# Patient Record
Sex: FEMALE | Race: WHITE | Employment: UNEMPLOYED
[De-identification: names, ages, dates, MRNs, and addresses within clinical notes are randomized per-mention and may not be internally consistent; named-entity substitution may affect disease eponyms.]

---

## 2017-01-01 PROBLEM — F17.200 SMOKER: Status: ACTIVE | Noted: 2017-01-01

## 2017-01-01 PROBLEM — E11.9 TYPE 2 DIABETES MELLITUS (HCC): Status: ACTIVE | Noted: 2017-01-01

## 2017-01-05 ENCOUNTER — TELEPHONE (OUTPATIENT)
Dept: NEUROLOGY | Facility: CLINIC | Age: 72
End: 2017-01-05

## 2017-01-11 PROBLEM — R07.1 CHEST PAIN ON BREATHING: Status: ACTIVE | Noted: 2017-01-11

## 2017-01-11 PROBLEM — R06.02 SOB (SHORTNESS OF BREATH): Status: ACTIVE | Noted: 2017-01-11

## 2017-01-11 PROBLEM — J20.9 ACUTE BRONCHITIS DUE TO INFECTION: Status: ACTIVE | Noted: 2017-01-11

## 2017-01-13 PROBLEM — E86.0 DEHYDRATION: Status: ACTIVE | Noted: 2017-01-13

## 2017-01-13 PROBLEM — J96.11 CHRONIC RESPIRATORY FAILURE WITH HYPOXIA (HCC): Status: ACTIVE | Noted: 2017-01-13

## 2017-02-12 ENCOUNTER — APPOINTMENT (OUTPATIENT)
Dept: CT IMAGING | Age: 72
DRG: 372 | End: 2017-02-12
Payer: COMMERCIAL

## 2017-02-12 ENCOUNTER — HOSPITAL ENCOUNTER (INPATIENT)
Age: 72
LOS: 3 days | Discharge: HOME HEALTH | DRG: 372 | End: 2017-02-15
Attending: EMERGENCY MEDICINE | Admitting: INTERNAL MEDICINE
Payer: COMMERCIAL
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
## 2017-02-12 DIAGNOSIS — D49.2 LUMBAR SPINE TUMOR: ICD-10-CM

## 2017-02-12 LAB
-: ABNORMAL
ABSOLUTE EOS #: 0 K/UL (ref 0–0.4)
ABSOLUTE LYMPH #: 2.2 K/UL (ref 1–4.8)
ABSOLUTE MONO #: 1 K/UL (ref 0.1–1.2)
AMORPHOUS: ABNORMAL
ANION GAP SERPL CALCULATED.3IONS-SCNC: 14 MMOL/L (ref 9–17)
BACTERIA: ABNORMAL
BASOPHILS # BLD: 0 % (ref 0–2)
BASOPHILS ABSOLUTE: 0 K/UL (ref 0–0.2)
BILIRUBIN URINE: NEGATIVE
BUN BLDV-MCNC: 31 MG/DL (ref 8–23)
BUN/CREAT BLD: ABNORMAL (ref 9–20)
CALCIUM SERPL-MCNC: 9.3 MG/DL (ref 8.6–10.4)
CASTS UA: ABNORMAL /LPF (ref 0–8)
CHLORIDE BLD-SCNC: 93 MMOL/L (ref 98–107)
CO2: 27 MMOL/L (ref 20–31)
COLOR: YELLOW
COMMENT UA: ABNORMAL
CREAT SERPL-MCNC: 0.91 MG/DL (ref 0.5–0.9)
CRYSTALS, UA: ABNORMAL /HPF
DIFFERENTIAL TYPE: ABNORMAL
EOSINOPHILS RELATIVE PERCENT: 0 % (ref 1–4)
EPITHELIAL CELLS UA: ABNORMAL /HPF (ref 0–5)
GFR AFRICAN AMERICAN: >60 ML/MIN
GFR NON-AFRICAN AMERICAN: >60 ML/MIN
GFR SERPL CREATININE-BSD FRML MDRD: ABNORMAL ML/MIN/{1.73_M2}
GFR SERPL CREATININE-BSD FRML MDRD: ABNORMAL ML/MIN/{1.73_M2}
GLUCOSE BLD-MCNC: 181 MG/DL (ref 65–105)
GLUCOSE BLD-MCNC: 96 MG/DL (ref 70–99)
GLUCOSE URINE: NEGATIVE
HCT VFR BLD CALC: 32.7 % (ref 36–46)
HEMOGLOBIN: 10.7 G/DL (ref 12–16)
KETONES, URINE: ABNORMAL
LACTIC ACID, WHOLE BLOOD: 1.6 MMOL/L (ref 0.7–2.1)
LEUKOCYTE ESTERASE, URINE: ABNORMAL
LYMPHOCYTES # BLD: 22 % (ref 24–44)
MCH RBC QN AUTO: 28.4 PG (ref 26–34)
MCHC RBC AUTO-ENTMCNC: 32.8 G/DL (ref 31–37)
MCV RBC AUTO: 86.8 FL (ref 80–100)
MONOCYTES # BLD: 10 % (ref 2–11)
MUCUS: ABNORMAL
NITRITE, URINE: NEGATIVE
OTHER OBSERVATIONS UA: ABNORMAL
PDW BLD-RTO: 15.4 % (ref 12.5–15.4)
PH UA: 5.5 (ref 5–8)
PLATELET # BLD: 431 K/UL (ref 140–450)
PLATELET ESTIMATE: ABNORMAL
PMV BLD AUTO: 8.2 FL (ref 6–12)
POTASSIUM SERPL-SCNC: 5.1 MMOL/L (ref 3.7–5.3)
PROTEIN UA: NEGATIVE
RBC # BLD: 3.77 M/UL (ref 4–5.2)
RBC # BLD: ABNORMAL 10*6/UL
RBC UA: ABNORMAL /HPF (ref 0–4)
RENAL EPITHELIAL, UA: ABNORMAL /HPF
SEG NEUTROPHILS: 68 % (ref 36–66)
SEGMENTED NEUTROPHILS ABSOLUTE COUNT: 6.9 K/UL (ref 1.8–7.7)
SODIUM BLD-SCNC: 134 MMOL/L (ref 135–144)
SPECIFIC GRAVITY UA: 1.02 (ref 1–1.03)
TRICHOMONAS: ABNORMAL
TURBIDITY: CLEAR
URINE HGB: NEGATIVE
UROBILINOGEN, URINE: NORMAL
WBC # BLD: 10.2 K/UL (ref 3.5–11)
WBC # BLD: ABNORMAL 10*3/UL
WBC UA: ABNORMAL /HPF (ref 0–5)
YEAST: ABNORMAL

## 2017-02-12 PROCEDURE — 99285 EMERGENCY DEPT VISIT HI MDM: CPT

## 2017-02-12 PROCEDURE — 72131 CT LUMBAR SPINE W/O DYE: CPT | Performed by: RADIOLOGY

## 2017-02-12 PROCEDURE — 80048 BASIC METABOLIC PNL TOTAL CA: CPT

## 2017-02-12 PROCEDURE — 1200000000 HC SEMI PRIVATE

## 2017-02-12 PROCEDURE — 6360000002 HC RX W HCPCS: Performed by: EMERGENCY MEDICINE

## 2017-02-12 PROCEDURE — 96374 THER/PROPH/DIAG INJ IV PUSH: CPT

## 2017-02-12 PROCEDURE — 87086 URINE CULTURE/COLONY COUNT: CPT

## 2017-02-12 PROCEDURE — 74176 CT ABD & PELVIS W/O CONTRAST: CPT

## 2017-02-12 PROCEDURE — 74176 CT ABD & PELVIS W/O CONTRAST: CPT | Performed by: RADIOLOGY

## 2017-02-12 PROCEDURE — 81001 URINALYSIS AUTO W/SCOPE: CPT

## 2017-02-12 PROCEDURE — 96375 TX/PRO/DX INJ NEW DRUG ADDON: CPT

## 2017-02-12 PROCEDURE — 72131 CT LUMBAR SPINE W/O DYE: CPT

## 2017-02-12 PROCEDURE — 83605 ASSAY OF LACTIC ACID: CPT

## 2017-02-12 PROCEDURE — 82947 ASSAY GLUCOSE BLOOD QUANT: CPT

## 2017-02-12 PROCEDURE — 93005 ELECTROCARDIOGRAM TRACING: CPT

## 2017-02-12 PROCEDURE — 85025 COMPLETE CBC W/AUTO DIFF WBC: CPT

## 2017-02-12 RX ORDER — MIRTAZAPINE 15 MG/1
15 TABLET, FILM COATED ORAL NIGHTLY
Status: CANCELLED | OUTPATIENT
Start: 2017-02-12

## 2017-02-12 RX ORDER — MORPHINE SULFATE 4 MG/ML
4 INJECTION, SOLUTION INTRAMUSCULAR; INTRAVENOUS ONCE
Status: COMPLETED | OUTPATIENT
Start: 2017-02-12 | End: 2017-02-12

## 2017-02-12 RX ORDER — FENTANYL CITRATE 50 UG/ML
25 INJECTION, SOLUTION INTRAMUSCULAR; INTRAVENOUS ONCE
Status: COMPLETED | OUTPATIENT
Start: 2017-02-12 | End: 2017-02-12

## 2017-02-12 RX ORDER — ACETAMINOPHEN 325 MG/1
650 TABLET ORAL EVERY 4 HOURS PRN
Status: CANCELLED | OUTPATIENT
Start: 2017-02-12

## 2017-02-12 RX ORDER — PREGABALIN 100 MG/1
100 CAPSULE ORAL 3 TIMES DAILY
Status: DISCONTINUED | OUTPATIENT
Start: 2017-02-12 | End: 2017-02-15 | Stop reason: HOSPADM

## 2017-02-12 RX ORDER — LANOLIN ALCOHOL/MO/W.PET/CERES
325 CREAM (GRAM) TOPICAL
Status: DISCONTINUED | OUTPATIENT
Start: 2017-02-12 | End: 2017-02-15 | Stop reason: HOSPADM

## 2017-02-12 RX ORDER — TRAZODONE HYDROCHLORIDE 50 MG/1
50 TABLET ORAL NIGHTLY
Status: CANCELLED | OUTPATIENT
Start: 2017-02-12

## 2017-02-12 RX ORDER — CETIRIZINE HYDROCHLORIDE 10 MG/1
10 TABLET ORAL DAILY
Status: CANCELLED | OUTPATIENT
Start: 2017-02-13

## 2017-02-12 RX ORDER — SODIUM CHLORIDE 0.9 % (FLUSH) 0.9 %
10 SYRINGE (ML) INJECTION EVERY 12 HOURS SCHEDULED
Status: DISCONTINUED | OUTPATIENT
Start: 2017-02-12 | End: 2017-02-15 | Stop reason: HOSPADM

## 2017-02-12 RX ORDER — METOPROLOL SUCCINATE 25 MG/1
25 TABLET, EXTENDED RELEASE ORAL DAILY
Status: DISCONTINUED | OUTPATIENT
Start: 2017-02-13 | End: 2017-02-13

## 2017-02-12 RX ORDER — TIZANIDINE 2 MG/1
2 TABLET ORAL 2 TIMES DAILY
Status: CANCELLED | OUTPATIENT
Start: 2017-02-12

## 2017-02-12 RX ORDER — DULOXETIN HYDROCHLORIDE 30 MG/1
60 CAPSULE, DELAYED RELEASE ORAL 2 TIMES DAILY
Status: DISCONTINUED | OUTPATIENT
Start: 2017-02-12 | End: 2017-02-15 | Stop reason: HOSPADM

## 2017-02-12 RX ORDER — OXCARBAZEPINE 300 MG/1
600 TABLET, FILM COATED ORAL 2 TIMES DAILY
Status: DISCONTINUED | OUTPATIENT
Start: 2017-02-12 | End: 2017-02-14

## 2017-02-12 RX ORDER — ALBUTEROL SULFATE 2.5 MG/3ML
2.5 SOLUTION RESPIRATORY (INHALATION) EVERY 6 HOURS PRN
Status: DISCONTINUED | OUTPATIENT
Start: 2017-02-12 | End: 2017-02-15 | Stop reason: HOSPADM

## 2017-02-12 RX ORDER — BUSPIRONE HYDROCHLORIDE 10 MG/1
10 TABLET ORAL 2 TIMES DAILY
Status: DISCONTINUED | OUTPATIENT
Start: 2017-02-12 | End: 2017-02-15 | Stop reason: HOSPADM

## 2017-02-12 RX ORDER — ONDANSETRON 2 MG/ML
4 INJECTION INTRAMUSCULAR; INTRAVENOUS EVERY 6 HOURS PRN
Status: DISCONTINUED | OUTPATIENT
Start: 2017-02-12 | End: 2017-02-15 | Stop reason: HOSPADM

## 2017-02-12 RX ORDER — ATORVASTATIN CALCIUM 40 MG/1
40 TABLET, FILM COATED ORAL DAILY
Status: DISCONTINUED | OUTPATIENT
Start: 2017-02-13 | End: 2017-02-15 | Stop reason: HOSPADM

## 2017-02-12 RX ORDER — BUDESONIDE AND FORMOTEROL FUMARATE DIHYDRATE 160; 4.5 UG/1; UG/1
1 AEROSOL RESPIRATORY (INHALATION) 2 TIMES DAILY
Status: DISCONTINUED | OUTPATIENT
Start: 2017-02-12 | End: 2017-02-15 | Stop reason: HOSPADM

## 2017-02-12 RX ORDER — ACETAMINOPHEN 325 MG/1
650 TABLET ORAL EVERY 4 HOURS PRN
Status: DISCONTINUED | OUTPATIENT
Start: 2017-02-12 | End: 2017-02-15 | Stop reason: HOSPADM

## 2017-02-12 RX ORDER — SODIUM CHLORIDE 0.9 % (FLUSH) 0.9 %
10 SYRINGE (ML) INJECTION PRN
Status: CANCELLED | OUTPATIENT
Start: 2017-02-12

## 2017-02-12 RX ORDER — SODIUM CHLORIDE 0.9 % (FLUSH) 0.9 %
10 SYRINGE (ML) INJECTION PRN
Status: DISCONTINUED | OUTPATIENT
Start: 2017-02-12 | End: 2017-02-15 | Stop reason: HOSPADM

## 2017-02-12 RX ORDER — OXYCODONE HYDROCHLORIDE AND ACETAMINOPHEN 5; 325 MG/1; MG/1
1 TABLET ORAL EVERY 4 HOURS PRN
Status: DISCONTINUED | OUTPATIENT
Start: 2017-02-12 | End: 2017-02-13

## 2017-02-12 RX ORDER — SODIUM CHLORIDE 0.9 % (FLUSH) 0.9 %
10 SYRINGE (ML) INJECTION EVERY 12 HOURS SCHEDULED
Status: CANCELLED | OUTPATIENT
Start: 2017-02-12

## 2017-02-12 RX ORDER — QUETIAPINE FUMARATE 25 MG/1
50 TABLET, FILM COATED ORAL DAILY
Status: DISCONTINUED | OUTPATIENT
Start: 2017-02-13 | End: 2017-02-14

## 2017-02-12 RX ORDER — SODIUM CHLORIDE 9 MG/ML
INJECTION, SOLUTION INTRAVENOUS CONTINUOUS
Status: DISCONTINUED | OUTPATIENT
Start: 2017-02-12 | End: 2017-02-15 | Stop reason: HOSPADM

## 2017-02-12 RX ADMIN — FENTANYL CITRATE 25 MCG: 50 INJECTION, SOLUTION INTRAMUSCULAR; INTRAVENOUS at 17:57

## 2017-02-12 RX ADMIN — MORPHINE SULFATE 4 MG: 4 INJECTION, SOLUTION INTRAMUSCULAR; INTRAVENOUS at 21:27

## 2017-02-12 ASSESSMENT — PAIN SCALES - GENERAL
PAINLEVEL_OUTOF10: 9
PAINLEVEL_OUTOF10: 8
PAINLEVEL_OUTOF10: 8
PAINLEVEL_OUTOF10: 10

## 2017-02-12 ASSESSMENT — PAIN DESCRIPTION - LOCATION
LOCATION: BACK;LEG
LOCATION: BACK

## 2017-02-12 ASSESSMENT — PAIN DESCRIPTION - DESCRIPTORS
DESCRIPTORS: DISCOMFORT;ACHING
DESCRIPTORS: DISCOMFORT;ACHING

## 2017-02-12 ASSESSMENT — PAIN DESCRIPTION - ORIENTATION
ORIENTATION: LOWER;LEFT;RIGHT
ORIENTATION: LEFT;MID

## 2017-02-12 ASSESSMENT — PAIN DESCRIPTION - ONSET: ONSET: GRADUAL

## 2017-02-12 ASSESSMENT — PAIN DESCRIPTION - PAIN TYPE
TYPE: ACUTE PAIN
TYPE: ACUTE PAIN

## 2017-02-12 ASSESSMENT — PAIN DESCRIPTION - PROGRESSION: CLINICAL_PROGRESSION: GRADUALLY WORSENING

## 2017-02-12 ASSESSMENT — PAIN DESCRIPTION - FREQUENCY: FREQUENCY: INTERMITTENT

## 2017-02-13 ENCOUNTER — APPOINTMENT (OUTPATIENT)
Dept: MRI IMAGING | Age: 72
DRG: 372 | End: 2017-02-13
Payer: COMMERCIAL

## 2017-02-13 PROBLEM — Z79.899 POLYPHARMACY: Status: ACTIVE | Noted: 2017-02-13

## 2017-02-13 PROBLEM — R55 PRE-SYNCOPE: Status: ACTIVE | Noted: 2017-02-13

## 2017-02-13 PROBLEM — J44.9 COPD (CHRONIC OBSTRUCTIVE PULMONARY DISEASE) (HCC): Status: ACTIVE | Noted: 2017-02-13

## 2017-02-13 PROBLEM — R42 DIZZINESS: Status: ACTIVE | Noted: 2017-02-13

## 2017-02-13 PROBLEM — F31.9 BIPOLAR 1 DISORDER (HCC): Status: ACTIVE | Noted: 2017-02-13

## 2017-02-13 PROBLEM — F32.A DEPRESSION: Status: ACTIVE | Noted: 2017-02-13

## 2017-02-13 LAB
ABSOLUTE EOS #: 0.1 K/UL (ref 0–0.4)
ABSOLUTE LYMPH #: 2.6 K/UL (ref 1–4.8)
ABSOLUTE MONO #: 0.9 K/UL (ref 0.1–1.2)
ANION GAP SERPL CALCULATED.3IONS-SCNC: 10 MMOL/L (ref 9–17)
BASOPHILS # BLD: 0 % (ref 0–2)
BASOPHILS ABSOLUTE: 0 K/UL (ref 0–0.2)
BUN BLDV-MCNC: 39 MG/DL (ref 8–23)
BUN/CREAT BLD: ABNORMAL (ref 9–20)
C DIFFICILE TOXINS, PCR: ABNORMAL
C-REACTIVE PROTEIN: 17.6 MG/L (ref 0–5)
CALCIUM SERPL-MCNC: 8.8 MG/DL (ref 8.6–10.4)
CHLORIDE BLD-SCNC: 94 MMOL/L (ref 98–107)
CO2: 29 MMOL/L (ref 20–31)
CREAT SERPL-MCNC: 1.26 MG/DL (ref 0.5–0.9)
CULTURE: NORMAL
CULTURE: NORMAL
DIFFERENTIAL TYPE: ABNORMAL
EOSINOPHILS RELATIVE PERCENT: 1 % (ref 1–4)
FERRITIN: 26 UG/L (ref 13–150)
GFR AFRICAN AMERICAN: 51 ML/MIN
GFR NON-AFRICAN AMERICAN: 42 ML/MIN
GFR SERPL CREATININE-BSD FRML MDRD: ABNORMAL ML/MIN/{1.73_M2}
GFR SERPL CREATININE-BSD FRML MDRD: ABNORMAL ML/MIN/{1.73_M2}
GLUCOSE BLD-MCNC: 121 MG/DL (ref 65–105)
GLUCOSE BLD-MCNC: 121 MG/DL (ref 65–105)
GLUCOSE BLD-MCNC: 143 MG/DL (ref 65–105)
GLUCOSE BLD-MCNC: 94 MG/DL (ref 70–99)
GLUCOSE BLD-MCNC: 98 MG/DL (ref 65–105)
HCT VFR BLD CALC: 28.4 % (ref 36–46)
HEMOGLOBIN: 9.3 G/DL (ref 12–16)
IRON SATURATION: 15 % (ref 20–55)
IRON: 38 UG/DL (ref 37–145)
LYMPHOCYTES # BLD: 30 % (ref 24–44)
Lab: NORMAL
MCH RBC QN AUTO: 28.4 PG (ref 26–34)
MCHC RBC AUTO-ENTMCNC: 32.7 G/DL (ref 31–37)
MCV RBC AUTO: 87 FL (ref 80–100)
MONOCYTES # BLD: 11 % (ref 2–11)
PDW BLD-RTO: 15.9 % (ref 12.5–15.4)
PLATELET # BLD: 366 K/UL (ref 140–450)
PLATELET ESTIMATE: ABNORMAL
PMV BLD AUTO: 7.7 FL (ref 6–12)
POTASSIUM SERPL-SCNC: 4.7 MMOL/L (ref 3.7–5.3)
RBC # BLD: 3.26 M/UL (ref 4–5.2)
RBC # BLD: ABNORMAL 10*6/UL
SEDIMENTATION RATE, ERYTHROCYTE: 23 MM (ref 0–20)
SEG NEUTROPHILS: 58 % (ref 36–66)
SEGMENTED NEUTROPHILS ABSOLUTE COUNT: 5.2 K/UL (ref 1.8–7.7)
SODIUM BLD-SCNC: 133 MMOL/L (ref 135–144)
SPECIMEN DESCRIPTION: ABNORMAL
SPECIMEN DESCRIPTION: NORMAL
STATUS: NORMAL
TOTAL CK: 22 U/L (ref 26–192)
TOTAL IRON BINDING CAPACITY: 247 UG/DL (ref 250–450)
UNSATURATED IRON BINDING CAPACITY: 209 UG/DL (ref 112–347)
WBC # BLD: 8.8 K/UL (ref 3.5–11)
WBC # BLD: ABNORMAL 10*3/UL

## 2017-02-13 PROCEDURE — 6370000000 HC RX 637 (ALT 250 FOR IP): Performed by: STUDENT IN AN ORGANIZED HEALTH CARE EDUCATION/TRAINING PROGRAM

## 2017-02-13 PROCEDURE — 97530 THERAPEUTIC ACTIVITIES: CPT

## 2017-02-13 PROCEDURE — 97162 PT EVAL MOD COMPLEX 30 MIN: CPT

## 2017-02-13 PROCEDURE — 85025 COMPLETE CBC W/AUTO DIFF WBC: CPT

## 2017-02-13 PROCEDURE — 83550 IRON BINDING TEST: CPT

## 2017-02-13 PROCEDURE — 83540 ASSAY OF IRON: CPT

## 2017-02-13 PROCEDURE — G8988 SELF CARE GOAL STATUS: HCPCS

## 2017-02-13 PROCEDURE — 72158 MRI LUMBAR SPINE W/O & W/DYE: CPT

## 2017-02-13 PROCEDURE — 97166 OT EVAL MOD COMPLEX 45 MIN: CPT

## 2017-02-13 PROCEDURE — 6370000000 HC RX 637 (ALT 250 FOR IP): Performed by: INTERNAL MEDICINE

## 2017-02-13 PROCEDURE — C9113 INJ PANTOPRAZOLE SODIUM, VIA: HCPCS | Performed by: NURSE PRACTITIONER

## 2017-02-13 PROCEDURE — 83630 LACTOFERRIN FECAL (QUAL): CPT

## 2017-02-13 PROCEDURE — G8979 MOBILITY GOAL STATUS: HCPCS

## 2017-02-13 PROCEDURE — 6360000002 HC RX W HCPCS: Performed by: STUDENT IN AN ORGANIZED HEALTH CARE EDUCATION/TRAINING PROGRAM

## 2017-02-13 PROCEDURE — 1200000000 HC SEMI PRIVATE

## 2017-02-13 PROCEDURE — 94640 AIRWAY INHALATION TREATMENT: CPT

## 2017-02-13 PROCEDURE — 85651 RBC SED RATE NONAUTOMATED: CPT

## 2017-02-13 PROCEDURE — 82728 ASSAY OF FERRITIN: CPT

## 2017-02-13 PROCEDURE — 87493 C DIFF AMPLIFIED PROBE: CPT

## 2017-02-13 PROCEDURE — 86140 C-REACTIVE PROTEIN: CPT

## 2017-02-13 PROCEDURE — 83993 ASSAY FOR CALPROTECTIN FECAL: CPT

## 2017-02-13 PROCEDURE — 83520 IMMUNOASSAY QUANT NOS NONAB: CPT

## 2017-02-13 PROCEDURE — 99233 SBSQ HOSP IP/OBS HIGH 50: CPT | Performed by: INTERNAL MEDICINE

## 2017-02-13 PROCEDURE — G8987 SELF CARE CURRENT STATUS: HCPCS

## 2017-02-13 PROCEDURE — G8978 MOBILITY CURRENT STATUS: HCPCS

## 2017-02-13 PROCEDURE — 6360000002 HC RX W HCPCS: Performed by: NURSE PRACTITIONER

## 2017-02-13 PROCEDURE — 2580000003 HC RX 258: Performed by: NURSE PRACTITIONER

## 2017-02-13 PROCEDURE — 6360000004 HC RX CONTRAST MEDICATION: Performed by: EMERGENCY MEDICINE

## 2017-02-13 PROCEDURE — 80048 BASIC METABOLIC PNL TOTAL CA: CPT

## 2017-02-13 PROCEDURE — A9579 GAD-BASE MR CONTRAST NOS,1ML: HCPCS | Performed by: EMERGENCY MEDICINE

## 2017-02-13 PROCEDURE — 99222 1ST HOSP IP/OBS MODERATE 55: CPT | Performed by: NEUROLOGICAL SURGERY

## 2017-02-13 PROCEDURE — 36415 COLL VENOUS BLD VENIPUNCTURE: CPT

## 2017-02-13 PROCEDURE — 2580000003 HC RX 258: Performed by: INTERNAL MEDICINE

## 2017-02-13 PROCEDURE — 82947 ASSAY GLUCOSE BLOOD QUANT: CPT

## 2017-02-13 PROCEDURE — 82550 ASSAY OF CK (CPK): CPT

## 2017-02-13 PROCEDURE — 2580000003 HC RX 258: Performed by: STUDENT IN AN ORGANIZED HEALTH CARE EDUCATION/TRAINING PROGRAM

## 2017-02-13 PROCEDURE — 97535 SELF CARE MNGMENT TRAINING: CPT

## 2017-02-13 RX ORDER — METRONIDAZOLE 500 MG/1
500 TABLET ORAL EVERY 8 HOURS SCHEDULED
Status: DISCONTINUED | OUTPATIENT
Start: 2017-02-13 | End: 2017-02-15 | Stop reason: HOSPADM

## 2017-02-13 RX ORDER — LORAZEPAM 0.5 MG/1
0.5 TABLET ORAL ONCE
Status: COMPLETED | OUTPATIENT
Start: 2017-02-13 | End: 2017-02-13

## 2017-02-13 RX ORDER — PANTOPRAZOLE SODIUM 40 MG/10ML
40 INJECTION, POWDER, LYOPHILIZED, FOR SOLUTION INTRAVENOUS 2 TIMES DAILY
Status: DISCONTINUED | OUTPATIENT
Start: 2017-02-13 | End: 2017-02-14

## 2017-02-13 RX ORDER — LOPERAMIDE HYDROCHLORIDE 2 MG/1
2 TABLET ORAL PRN
COMMUNITY

## 2017-02-13 RX ORDER — DEXTROSE MONOHYDRATE 50 MG/ML
100 INJECTION, SOLUTION INTRAVENOUS PRN
Status: DISCONTINUED | OUTPATIENT
Start: 2017-02-13 | End: 2017-02-15 | Stop reason: HOSPADM

## 2017-02-13 RX ORDER — DEXTROSE MONOHYDRATE 25 G/50ML
12.5 INJECTION, SOLUTION INTRAVENOUS PRN
Status: DISCONTINUED | OUTPATIENT
Start: 2017-02-13 | End: 2017-02-15 | Stop reason: HOSPADM

## 2017-02-13 RX ORDER — 0.9 % SODIUM CHLORIDE 0.9 %
10 VIAL (ML) INJECTION 2 TIMES DAILY
Status: DISCONTINUED | OUTPATIENT
Start: 2017-02-13 | End: 2017-02-14

## 2017-02-13 RX ORDER — 0.9 % SODIUM CHLORIDE 0.9 %
1000 INTRAVENOUS SOLUTION INTRAVENOUS ONCE
Status: COMPLETED | OUTPATIENT
Start: 2017-02-13 | End: 2017-02-13

## 2017-02-13 RX ORDER — MORPHINE SULFATE 4 MG/ML
4 INJECTION, SOLUTION INTRAMUSCULAR; INTRAVENOUS EVERY 4 HOURS PRN
Status: DISCONTINUED | OUTPATIENT
Start: 2017-02-13 | End: 2017-02-13

## 2017-02-13 RX ORDER — NICOTINE POLACRILEX 4 MG
15 LOZENGE BUCCAL PRN
Status: DISCONTINUED | OUTPATIENT
Start: 2017-02-13 | End: 2017-02-15 | Stop reason: HOSPADM

## 2017-02-13 RX ORDER — OXYCODONE HYDROCHLORIDE 5 MG/1
5 TABLET ORAL EVERY 4 HOURS PRN
Status: DISCONTINUED | OUTPATIENT
Start: 2017-02-13 | End: 2017-02-15 | Stop reason: HOSPADM

## 2017-02-13 RX ORDER — MORPHINE SULFATE 2 MG/ML
2 INJECTION, SOLUTION INTRAMUSCULAR; INTRAVENOUS EVERY 4 HOURS PRN
Status: DISCONTINUED | OUTPATIENT
Start: 2017-02-13 | End: 2017-02-13

## 2017-02-13 RX ADMIN — METOPROLOL SUCCINATE 25 MG: 25 TABLET, FILM COATED, EXTENDED RELEASE ORAL at 08:04

## 2017-02-13 RX ADMIN — PANTOPRAZOLE SODIUM 40 MG: 40 INJECTION, POWDER, FOR SOLUTION INTRAVENOUS at 15:41

## 2017-02-13 RX ADMIN — DULOXETINE HYDROCHLORIDE 60 MG: 30 CAPSULE, DELAYED RELEASE ORAL at 20:47

## 2017-02-13 RX ADMIN — MORPHINE SULFATE 2 MG: 2 INJECTION, SOLUTION INTRAMUSCULAR; INTRAVENOUS at 03:22

## 2017-02-13 RX ADMIN — METRONIDAZOLE 500 MG: 500 TABLET, FILM COATED ORAL at 17:26

## 2017-02-13 RX ADMIN — SODIUM CHLORIDE: 9 INJECTION, SOLUTION INTRAVENOUS at 01:23

## 2017-02-13 RX ADMIN — Medication 10 ML: at 20:48

## 2017-02-13 RX ADMIN — TIOTROPIUM BROMIDE 18 MCG: 18 CAPSULE ORAL; RESPIRATORY (INHALATION) at 08:22

## 2017-02-13 RX ADMIN — OXYCODONE HYDROCHLORIDE AND ACETAMINOPHEN 1 TABLET: 5; 325 TABLET ORAL at 00:29

## 2017-02-13 RX ADMIN — OXYCODONE HYDROCHLORIDE 5 MG: 5 TABLET ORAL at 13:06

## 2017-02-13 RX ADMIN — GADOPENTETATE DIMEGLUMINE 16 ML: 469.01 INJECTION INTRAVENOUS at 06:16

## 2017-02-13 RX ADMIN — SODIUM CHLORIDE 1000 ML: 9 INJECTION, SOLUTION INTRAVENOUS at 13:06

## 2017-02-13 RX ADMIN — PREGABALIN 100 MG: 100 CAPSULE ORAL at 08:04

## 2017-02-13 RX ADMIN — BUSPIRONE HYDROCHLORIDE 10 MG: 10 TABLET ORAL at 08:04

## 2017-02-13 RX ADMIN — BUSPIRONE HYDROCHLORIDE 10 MG: 10 TABLET ORAL at 01:25

## 2017-02-13 RX ADMIN — BUSPIRONE HYDROCHLORIDE 10 MG: 10 TABLET ORAL at 20:47

## 2017-02-13 RX ADMIN — PANTOPRAZOLE SODIUM 40 MG: 40 INJECTION, POWDER, FOR SOLUTION INTRAVENOUS at 20:48

## 2017-02-13 RX ADMIN — VITAMIN D, TAB 1000IU (100/BT) 2000 UNITS: 25 TAB at 01:24

## 2017-02-13 RX ADMIN — OXCARBAZEPINE 600 MG: 300 TABLET, FILM COATED ORAL at 20:47

## 2017-02-13 RX ADMIN — BUDESONIDE AND FORMOTEROL FUMARATE DIHYDRATE 1 PUFF: 160; 4.5 AEROSOL RESPIRATORY (INHALATION) at 08:22

## 2017-02-13 RX ADMIN — FERROUS SULFATE TAB EC 325 MG (65 MG FE EQUIVALENT) 325 MG: 325 (65 FE) TABLET DELAYED RESPONSE at 17:23

## 2017-02-13 RX ADMIN — OXCARBAZEPINE 600 MG: 300 TABLET, FILM COATED ORAL at 08:04

## 2017-02-13 RX ADMIN — DULOXETINE HYDROCHLORIDE 60 MG: 30 CAPSULE, DELAYED RELEASE ORAL at 01:24

## 2017-02-13 RX ADMIN — VITAMIN D, TAB 1000IU (100/BT) 2000 UNITS: 25 TAB at 17:23

## 2017-02-13 RX ADMIN — ATORVASTATIN CALCIUM 40 MG: 40 TABLET, FILM COATED ORAL at 17:23

## 2017-02-13 RX ADMIN — QUETIAPINE FUMARATE 50 MG: 25 TABLET ORAL at 20:47

## 2017-02-13 RX ADMIN — DULOXETINE HYDROCHLORIDE 60 MG: 30 CAPSULE, DELAYED RELEASE ORAL at 08:04

## 2017-02-13 RX ADMIN — OXYCODONE HYDROCHLORIDE 5 MG: 5 TABLET ORAL at 17:22

## 2017-02-13 RX ADMIN — BUDESONIDE AND FORMOTEROL FUMARATE DIHYDRATE 1 PUFF: 160; 4.5 AEROSOL RESPIRATORY (INHALATION) at 18:34

## 2017-02-13 RX ADMIN — OXCARBAZEPINE 600 MG: 300 TABLET, FILM COATED ORAL at 01:25

## 2017-02-13 RX ADMIN — PREGABALIN 100 MG: 100 CAPSULE ORAL at 01:24

## 2017-02-13 RX ADMIN — OXYCODONE HYDROCHLORIDE 5 MG: 5 TABLET ORAL at 22:01

## 2017-02-13 RX ADMIN — METRONIDAZOLE 500 MG: 500 TABLET, FILM COATED ORAL at 20:47

## 2017-02-13 RX ADMIN — PREGABALIN 100 MG: 100 CAPSULE ORAL at 14:38

## 2017-02-13 RX ADMIN — FERROUS SULFATE TAB EC 325 MG (65 MG FE EQUIVALENT) 325 MG: 325 (65 FE) TABLET DELAYED RESPONSE at 01:24

## 2017-02-13 RX ADMIN — LORAZEPAM 0.5 MG: 0.5 TABLET ORAL at 04:34

## 2017-02-13 RX ADMIN — PREGABALIN 100 MG: 100 CAPSULE ORAL at 20:47

## 2017-02-13 RX ADMIN — MORPHINE SULFATE 4 MG: 4 INJECTION, SOLUTION INTRAMUSCULAR; INTRAVENOUS at 07:36

## 2017-02-13 RX ADMIN — Medication 10 ML: at 15:41

## 2017-02-13 ASSESSMENT — PAIN SCALES - GENERAL
PAINLEVEL_OUTOF10: 8
PAINLEVEL_OUTOF10: 8
PAINLEVEL_OUTOF10: 9
PAINLEVEL_OUTOF10: 8
PAINLEVEL_OUTOF10: 9

## 2017-02-13 ASSESSMENT — ENCOUNTER SYMPTOMS
NAUSEA: 0
WHEEZING: 0
ABDOMINAL PAIN: 0
SHORTNESS OF BREATH: 0
BACK PAIN: 1
CHEST TIGHTNESS: 0
BLOOD IN STOOL: 1
DIARRHEA: 0
VOMITING: 0
TROUBLE SWALLOWING: 0
PHOTOPHOBIA: 0

## 2017-02-13 ASSESSMENT — PAIN DESCRIPTION - FREQUENCY: FREQUENCY: INTERMITTENT

## 2017-02-13 ASSESSMENT — PAIN DESCRIPTION - LOCATION: LOCATION: BACK

## 2017-02-13 ASSESSMENT — PAIN DESCRIPTION - PAIN TYPE: TYPE: ACUTE PAIN

## 2017-02-13 ASSESSMENT — PAIN DESCRIPTION - ORIENTATION: ORIENTATION: MID

## 2017-02-14 ENCOUNTER — APPOINTMENT (OUTPATIENT)
Dept: MRI IMAGING | Age: 72
DRG: 372 | End: 2017-02-14
Payer: COMMERCIAL

## 2017-02-14 ENCOUNTER — ANESTHESIA EVENT (OUTPATIENT)
Dept: ENDOSCOPY | Age: 72
DRG: 372 | End: 2017-02-14
Payer: COMMERCIAL

## 2017-02-14 ENCOUNTER — ANESTHESIA (OUTPATIENT)
Dept: ENDOSCOPY | Age: 72
DRG: 372 | End: 2017-02-14
Payer: COMMERCIAL

## 2017-02-14 ENCOUNTER — SURGERY (OUTPATIENT)
Age: 72
End: 2017-02-14

## 2017-02-14 VITALS
DIASTOLIC BLOOD PRESSURE: 60 MMHG | RESPIRATION RATE: 26 BRPM | SYSTOLIC BLOOD PRESSURE: 116 MMHG | OXYGEN SATURATION: 98 %

## 2017-02-14 LAB
ANION GAP SERPL CALCULATED.3IONS-SCNC: 8 MMOL/L (ref 9–17)
BUN BLDV-MCNC: 23 MG/DL (ref 8–23)
BUN/CREAT BLD: ABNORMAL (ref 9–20)
CALCIUM SERPL-MCNC: 8.5 MG/DL (ref 8.6–10.4)
CHLORIDE BLD-SCNC: 94 MMOL/L (ref 98–107)
CO2: 29 MMOL/L (ref 20–31)
CREAT SERPL-MCNC: 0.82 MG/DL (ref 0.5–0.9)
GFR AFRICAN AMERICAN: >60 ML/MIN
GFR NON-AFRICAN AMERICAN: >60 ML/MIN
GFR SERPL CREATININE-BSD FRML MDRD: ABNORMAL ML/MIN/{1.73_M2}
GFR SERPL CREATININE-BSD FRML MDRD: ABNORMAL ML/MIN/{1.73_M2}
GLUCOSE BLD-MCNC: 115 MG/DL (ref 70–99)
GLUCOSE BLD-MCNC: 117 MG/DL (ref 65–105)
GLUCOSE BLD-MCNC: 119 MG/DL (ref 65–105)
GLUCOSE BLD-MCNC: 130 MG/DL (ref 65–105)
HCT VFR BLD CALC: 25.7 % (ref 36–46)
HEMOGLOBIN: 8.7 G/DL (ref 12–16)
POTASSIUM SERPL-SCNC: 4.2 MMOL/L (ref 3.7–5.3)
SODIUM BLD-SCNC: 131 MMOL/L (ref 135–144)

## 2017-02-14 PROCEDURE — 82947 ASSAY GLUCOSE BLOOD QUANT: CPT

## 2017-02-14 PROCEDURE — C9113 INJ PANTOPRAZOLE SODIUM, VIA: HCPCS | Performed by: NURSE PRACTITIONER

## 2017-02-14 PROCEDURE — 2580000003 HC RX 258: Performed by: NURSE ANESTHETIST, CERTIFIED REGISTERED

## 2017-02-14 PROCEDURE — 80048 BASIC METABOLIC PNL TOTAL CA: CPT

## 2017-02-14 PROCEDURE — 99231 SBSQ HOSP IP/OBS SF/LOW 25: CPT | Performed by: NEUROLOGICAL SURGERY

## 2017-02-14 PROCEDURE — 7100000010 HC PHASE II RECOVERY - FIRST 15 MIN: Performed by: INTERNAL MEDICINE

## 2017-02-14 PROCEDURE — 6370000000 HC RX 637 (ALT 250 FOR IP): Performed by: STUDENT IN AN ORGANIZED HEALTH CARE EDUCATION/TRAINING PROGRAM

## 2017-02-14 PROCEDURE — 72146 MRI CHEST SPINE W/O DYE: CPT

## 2017-02-14 PROCEDURE — 99233 SBSQ HOSP IP/OBS HIGH 50: CPT | Performed by: INTERNAL MEDICINE

## 2017-02-14 PROCEDURE — 85018 HEMOGLOBIN: CPT

## 2017-02-14 PROCEDURE — 2500000003 HC RX 250 WO HCPCS: Performed by: NURSE ANESTHETIST, CERTIFIED REGISTERED

## 2017-02-14 PROCEDURE — 0DB68ZX EXCISION OF STOMACH, VIA NATURAL OR ARTIFICIAL OPENING ENDOSCOPIC, DIAGNOSTIC: ICD-10-PCS | Performed by: INTERNAL MEDICINE

## 2017-02-14 PROCEDURE — 3700000000 HC ANESTHESIA ATTENDED CARE: Performed by: INTERNAL MEDICINE

## 2017-02-14 PROCEDURE — 94640 AIRWAY INHALATION TREATMENT: CPT

## 2017-02-14 PROCEDURE — 88342 IMHCHEM/IMCYTCHM 1ST ANTB: CPT

## 2017-02-14 PROCEDURE — 6360000002 HC RX W HCPCS: Performed by: NURSE ANESTHETIST, CERTIFIED REGISTERED

## 2017-02-14 PROCEDURE — 6360000002 HC RX W HCPCS: Performed by: NURSE PRACTITIONER

## 2017-02-14 PROCEDURE — 36415 COLL VENOUS BLD VENIPUNCTURE: CPT

## 2017-02-14 PROCEDURE — 72141 MRI NECK SPINE W/O DYE: CPT

## 2017-02-14 PROCEDURE — 2580000003 HC RX 258: Performed by: NURSE PRACTITIONER

## 2017-02-14 PROCEDURE — 70551 MRI BRAIN STEM W/O DYE: CPT

## 2017-02-14 PROCEDURE — 3609012400 HC EGD TRANSORAL BIOPSY SINGLE/MULTIPLE: Performed by: INTERNAL MEDICINE

## 2017-02-14 PROCEDURE — 6370000000 HC RX 637 (ALT 250 FOR IP): Performed by: INTERNAL MEDICINE

## 2017-02-14 PROCEDURE — 85014 HEMATOCRIT: CPT

## 2017-02-14 PROCEDURE — 0DB98ZX EXCISION OF DUODENUM, VIA NATURAL OR ARTIFICIAL OPENING ENDOSCOPIC, DIAGNOSTIC: ICD-10-PCS | Performed by: INTERNAL MEDICINE

## 2017-02-14 PROCEDURE — 88305 TISSUE EXAM BY PATHOLOGIST: CPT

## 2017-02-14 PROCEDURE — 7100000011 HC PHASE II RECOVERY - ADDTL 15 MIN: Performed by: INTERNAL MEDICINE

## 2017-02-14 PROCEDURE — 1200000000 HC SEMI PRIVATE

## 2017-02-14 PROCEDURE — 3700000001 HC ADD 15 MINUTES (ANESTHESIA): Performed by: INTERNAL MEDICINE

## 2017-02-14 RX ORDER — LIDOCAINE HYDROCHLORIDE 10 MG/ML
INJECTION, SOLUTION INFILTRATION; PERINEURAL PRN
Status: DISCONTINUED | OUTPATIENT
Start: 2017-02-14 | End: 2017-02-14 | Stop reason: SDUPTHER

## 2017-02-14 RX ORDER — SODIUM CHLORIDE 9 MG/ML
INJECTION, SOLUTION INTRAVENOUS CONTINUOUS PRN
Status: DISCONTINUED | OUTPATIENT
Start: 2017-02-14 | End: 2017-02-14 | Stop reason: SDUPTHER

## 2017-02-14 RX ORDER — OXCARBAZEPINE 300 MG/1
300 TABLET, FILM COATED ORAL 2 TIMES DAILY
Status: DISCONTINUED | OUTPATIENT
Start: 2017-02-14 | End: 2017-02-15 | Stop reason: HOSPADM

## 2017-02-14 RX ORDER — LORAZEPAM 2 MG/ML
1 INJECTION INTRAMUSCULAR
Status: DISPENSED | OUTPATIENT
Start: 2017-02-14 | End: 2017-02-14

## 2017-02-14 RX ORDER — PROPOFOL 10 MG/ML
INJECTION, EMULSION INTRAVENOUS PRN
Status: DISCONTINUED | OUTPATIENT
Start: 2017-02-14 | End: 2017-02-14 | Stop reason: SDUPTHER

## 2017-02-14 RX ORDER — LORAZEPAM 0.5 MG/1
0.5 TABLET ORAL ONCE
Status: COMPLETED | OUTPATIENT
Start: 2017-02-14 | End: 2017-02-14

## 2017-02-14 RX ADMIN — OXYCODONE HYDROCHLORIDE 5 MG: 5 TABLET ORAL at 03:22

## 2017-02-14 RX ADMIN — OXYCODONE HYDROCHLORIDE 5 MG: 5 TABLET ORAL at 22:23

## 2017-02-14 RX ADMIN — OXYCODONE HYDROCHLORIDE 5 MG: 5 TABLET ORAL at 18:13

## 2017-02-14 RX ADMIN — PROPOFOL 100 MG: 10 INJECTION, EMULSION INTRAVENOUS at 16:37

## 2017-02-14 RX ADMIN — PANTOPRAZOLE SODIUM 40 MG: 40 INJECTION, POWDER, FOR SOLUTION INTRAVENOUS at 10:10

## 2017-02-14 RX ADMIN — LIDOCAINE HYDROCHLORIDE 50 MG: 10 INJECTION, SOLUTION INFILTRATION; PERINEURAL at 16:35

## 2017-02-14 RX ADMIN — BUDESONIDE AND FORMOTEROL FUMARATE DIHYDRATE 1 PUFF: 160; 4.5 AEROSOL RESPIRATORY (INHALATION) at 08:45

## 2017-02-14 RX ADMIN — Medication 10 ML: at 10:11

## 2017-02-14 RX ADMIN — METRONIDAZOLE 500 MG: 500 TABLET, FILM COATED ORAL at 22:23

## 2017-02-14 RX ADMIN — TIOTROPIUM BROMIDE 18 MCG: 18 CAPSULE ORAL; RESPIRATORY (INHALATION) at 08:45

## 2017-02-14 RX ADMIN — LORAZEPAM 0.5 MG: 0.5 TABLET ORAL at 08:39

## 2017-02-14 RX ADMIN — OXYCODONE HYDROCHLORIDE 5 MG: 5 TABLET ORAL at 11:05

## 2017-02-14 RX ADMIN — SODIUM CHLORIDE: 9 INJECTION, SOLUTION INTRAVENOUS at 16:35

## 2017-02-14 ASSESSMENT — PAIN DESCRIPTION - LOCATION: LOCATION: BACK

## 2017-02-14 ASSESSMENT — PAIN SCALES - GENERAL
PAINLEVEL_OUTOF10: 0
PAINLEVEL_OUTOF10: 8
PAINLEVEL_OUTOF10: 9
PAINLEVEL_OUTOF10: 0
PAINLEVEL_OUTOF10: 8
PAINLEVEL_OUTOF10: 4

## 2017-02-14 ASSESSMENT — PAIN - FUNCTIONAL ASSESSMENT
PAIN_FUNCTIONAL_ASSESSMENT: 0-10
PAIN_FUNCTIONAL_ASSESSMENT: 0-10

## 2017-02-14 ASSESSMENT — COPD QUESTIONNAIRES: CAT_SEVERITY: MODERATE

## 2017-02-14 ASSESSMENT — PAIN DESCRIPTION - PAIN TYPE: TYPE: ACUTE PAIN

## 2017-02-14 ASSESSMENT — ENCOUNTER SYMPTOMS: SHORTNESS OF BREATH: 1

## 2017-02-15 VITALS
OXYGEN SATURATION: 99 % | HEART RATE: 86 BPM | RESPIRATION RATE: 16 BRPM | TEMPERATURE: 96.6 F | HEIGHT: 56 IN | WEIGHT: 174.82 LBS | BODY MASS INDEX: 39.33 KG/M2 | SYSTOLIC BLOOD PRESSURE: 110 MMHG | DIASTOLIC BLOOD PRESSURE: 60 MMHG

## 2017-02-15 LAB
ANION GAP SERPL CALCULATED.3IONS-SCNC: 10 MMOL/L (ref 9–17)
BUN BLDV-MCNC: 13 MG/DL (ref 8–23)
BUN/CREAT BLD: ABNORMAL (ref 9–20)
CALCIUM SERPL-MCNC: 8.7 MG/DL (ref 8.6–10.4)
CHLORIDE BLD-SCNC: 97 MMOL/L (ref 98–107)
CO2: 29 MMOL/L (ref 20–31)
CREAT SERPL-MCNC: 0.53 MG/DL (ref 0.5–0.9)
GFR AFRICAN AMERICAN: >60 ML/MIN
GFR NON-AFRICAN AMERICAN: >60 ML/MIN
GFR SERPL CREATININE-BSD FRML MDRD: ABNORMAL ML/MIN/{1.73_M2}
GFR SERPL CREATININE-BSD FRML MDRD: ABNORMAL ML/MIN/{1.73_M2}
GLUCOSE BLD-MCNC: 125 MG/DL (ref 70–99)
GLUCOSE BLD-MCNC: 142 MG/DL (ref 65–105)
GLUCOSE BLD-MCNC: 160 MG/DL (ref 65–105)
LACTOFERRIN, QUAL: ABNORMAL
POTASSIUM SERPL-SCNC: 4.2 MMOL/L (ref 3.7–5.3)
SODIUM BLD-SCNC: 136 MMOL/L (ref 135–144)

## 2017-02-15 PROCEDURE — 99239 HOSP IP/OBS DSCHRG MGMT >30: CPT | Performed by: INTERNAL MEDICINE

## 2017-02-15 PROCEDURE — 99232 SBSQ HOSP IP/OBS MODERATE 35: CPT | Performed by: NEUROLOGICAL SURGERY

## 2017-02-15 PROCEDURE — 6370000000 HC RX 637 (ALT 250 FOR IP): Performed by: STUDENT IN AN ORGANIZED HEALTH CARE EDUCATION/TRAINING PROGRAM

## 2017-02-15 PROCEDURE — 97110 THERAPEUTIC EXERCISES: CPT

## 2017-02-15 PROCEDURE — 6370000000 HC RX 637 (ALT 250 FOR IP): Performed by: INTERNAL MEDICINE

## 2017-02-15 PROCEDURE — 36415 COLL VENOUS BLD VENIPUNCTURE: CPT

## 2017-02-15 PROCEDURE — 97535 SELF CARE MNGMENT TRAINING: CPT | Performed by: OCCUPATIONAL THERAPIST

## 2017-02-15 PROCEDURE — 80048 BASIC METABOLIC PNL TOTAL CA: CPT

## 2017-02-15 PROCEDURE — 97116 GAIT TRAINING THERAPY: CPT

## 2017-02-15 PROCEDURE — 82947 ASSAY GLUCOSE BLOOD QUANT: CPT

## 2017-02-15 RX ORDER — OXCARBAZEPINE 150 MG/1
150 TABLET, FILM COATED ORAL 2 TIMES DAILY
Qty: 6 TABLET | Refills: 0 | Status: ON HOLD | OUTPATIENT
Start: 2017-02-15 | End: 2017-06-27 | Stop reason: HOSPADM

## 2017-02-15 RX ORDER — METRONIDAZOLE 500 MG/1
500 TABLET ORAL EVERY 8 HOURS SCHEDULED
Qty: 30 TABLET | Refills: 0 | Status: SHIPPED | OUTPATIENT
Start: 2017-02-15 | End: 2017-02-25

## 2017-02-15 RX ADMIN — PREGABALIN 100 MG: 100 CAPSULE ORAL at 10:33

## 2017-02-15 RX ADMIN — OXYCODONE HYDROCHLORIDE 5 MG: 5 TABLET ORAL at 02:41

## 2017-02-15 RX ADMIN — BUSPIRONE HYDROCHLORIDE 10 MG: 10 TABLET ORAL at 10:33

## 2017-02-15 RX ADMIN — OXYCODONE HYDROCHLORIDE 5 MG: 5 TABLET ORAL at 10:35

## 2017-02-15 RX ADMIN — OXYCODONE HYDROCHLORIDE 5 MG: 5 TABLET ORAL at 14:46

## 2017-02-15 RX ADMIN — PREGABALIN 100 MG: 100 CAPSULE ORAL at 14:46

## 2017-02-15 RX ADMIN — DULOXETINE HYDROCHLORIDE 60 MG: 30 CAPSULE, DELAYED RELEASE ORAL at 10:32

## 2017-02-15 RX ADMIN — METRONIDAZOLE 500 MG: 500 TABLET, FILM COATED ORAL at 14:46

## 2017-02-15 RX ADMIN — METRONIDAZOLE 500 MG: 500 TABLET, FILM COATED ORAL at 06:42

## 2017-02-15 RX ADMIN — INSULIN LISPRO 2 UNITS: 100 INJECTION, SOLUTION INTRAVENOUS; SUBCUTANEOUS at 10:31

## 2017-02-15 RX ADMIN — OXCARBAZEPINE 300 MG: 300 TABLET, FILM COATED ORAL at 10:34

## 2017-02-15 RX ADMIN — OXYCODONE HYDROCHLORIDE 5 MG: 5 TABLET ORAL at 06:43

## 2017-02-15 ASSESSMENT — PAIN DESCRIPTION - PAIN TYPE: TYPE: ACUTE PAIN

## 2017-02-15 ASSESSMENT — PAIN SCALES - GENERAL
PAINLEVEL_OUTOF10: 8
PAINLEVEL_OUTOF10: 9
PAINLEVEL_OUTOF10: 7
PAINLEVEL_OUTOF10: 10

## 2017-02-15 ASSESSMENT — PAIN DESCRIPTION - LOCATION: LOCATION: FLANK

## 2017-02-15 ASSESSMENT — PAIN DESCRIPTION - ORIENTATION: ORIENTATION: RIGHT

## 2017-02-16 LAB
CALPROTECTIN, FECAL: 713 UG/G
EKG ATRIAL RATE: 98 BPM
EKG P AXIS: 82 DEGREES
EKG P-R INTERVAL: 200 MS
EKG Q-T INTERVAL: 364 MS
EKG QRS DURATION: 128 MS
EKG QTC CALCULATION (BAZETT): 464 MS
EKG R AXIS: -71 DEGREES
EKG T AXIS: 63 DEGREES
EKG VENTRICULAR RATE: 98 BPM
GLUCOSE BLD-MCNC: 163 MG/DL (ref 65–105)

## 2017-02-17 LAB
FECAL PANCREATIC ELASTASE-1: 78 UG/G
SURGICAL PATHOLOGY REPORT: NORMAL

## 2017-02-23 ENCOUNTER — HOSPITAL ENCOUNTER (OUTPATIENT)
Dept: OTHER | Age: 72
Discharge: HOME | End: 2017-02-23
Payer: COMMERCIAL

## 2017-03-02 ENCOUNTER — HOSPITAL ENCOUNTER (OUTPATIENT)
Dept: OTHER | Age: 72
Discharge: HOME | End: 2017-03-02
Payer: COMMERCIAL

## 2017-04-26 ENCOUNTER — HOSPITAL ENCOUNTER (EMERGENCY)
Age: 72
Discharge: HOME | End: 2017-04-26
Attending: EMERGENCY MEDICINE
Payer: COMMERCIAL

## 2017-04-26 VITALS
SYSTOLIC BLOOD PRESSURE: 148 MMHG | TEMPERATURE: 97.2 F | RESPIRATION RATE: 20 BRPM | HEART RATE: 99 BPM | OXYGEN SATURATION: 98 % | DIASTOLIC BLOOD PRESSURE: 89 MMHG

## 2017-04-26 DIAGNOSIS — S61.219A LACERATION OF FINGER, INITIAL ENCOUNTER: Primary | ICD-10-CM

## 2017-04-26 PROCEDURE — 6370000000 HC RX 637 (ALT 250 FOR IP): Performed by: EMERGENCY MEDICINE

## 2017-04-26 PROCEDURE — 90715 TDAP VACCINE 7 YRS/> IM: CPT | Performed by: EMERGENCY MEDICINE

## 2017-04-26 PROCEDURE — 90471 IMMUNIZATION ADMIN: CPT | Performed by: EMERGENCY MEDICINE

## 2017-04-26 PROCEDURE — 12001 RPR S/N/AX/GEN/TRNK 2.5CM/<: CPT

## 2017-04-26 PROCEDURE — 6360000002 HC RX W HCPCS: Performed by: EMERGENCY MEDICINE

## 2017-04-26 PROCEDURE — G0381 LEV 2 HOSP TYPE B ED VISIT: HCPCS

## 2017-04-26 RX ORDER — ACETAMINOPHEN 500 MG
1000 TABLET ORAL ONCE
Status: COMPLETED | OUTPATIENT
Start: 2017-04-26 | End: 2017-04-26

## 2017-04-26 RX ADMIN — ACETAMINOPHEN 1000 MG: 500 TABLET ORAL at 21:10

## 2017-04-26 RX ADMIN — TETANUS TOXOID, REDUCED DIPHTHERIA TOXOID AND ACELLULAR PERTUSSIS VACCINE, ADSORBED 0.5 ML: 5; 2.5; 8; 8; 2.5 SUSPENSION INTRAMUSCULAR at 21:11

## 2017-04-26 ASSESSMENT — PAIN DESCRIPTION - DESCRIPTORS: DESCRIPTORS: ACHING

## 2017-04-26 ASSESSMENT — ENCOUNTER SYMPTOMS
NAUSEA: 0
VOMITING: 0
DIARRHEA: 0
EYE PAIN: 0
BLOOD IN STOOL: 0
ABDOMINAL PAIN: 0
SORE THROAT: 0
COUGH: 0
SHORTNESS OF BREATH: 0
RHINORRHEA: 0
PHOTOPHOBIA: 0

## 2017-04-26 ASSESSMENT — PAIN DESCRIPTION - LOCATION: LOCATION: FINGER (COMMENT WHICH ONE)

## 2017-04-26 ASSESSMENT — PAIN DESCRIPTION - ORIENTATION: ORIENTATION: LEFT

## 2017-04-26 ASSESSMENT — PAIN DESCRIPTION - ONSET: ONSET: ON-GOING

## 2017-04-26 ASSESSMENT — PAIN SCALES - GENERAL
PAINLEVEL_OUTOF10: 10
PAINLEVEL_OUTOF10: 10

## 2017-04-26 ASSESSMENT — PAIN DESCRIPTION - PROGRESSION: CLINICAL_PROGRESSION: NOT CHANGED

## 2017-04-26 ASSESSMENT — PAIN DESCRIPTION - FREQUENCY: FREQUENCY: CONTINUOUS

## 2017-06-22 ENCOUNTER — HOSPITAL ENCOUNTER (INPATIENT)
Age: 72
LOS: 5 days | Discharge: HOME HEALTH | DRG: 386 | End: 2017-06-27
Attending: EMERGENCY MEDICINE | Admitting: INTERNAL MEDICINE
Payer: COMMERCIAL

## 2017-06-22 ENCOUNTER — APPOINTMENT (OUTPATIENT)
Dept: GENERAL RADIOLOGY | Age: 72
DRG: 386 | End: 2017-06-22
Payer: COMMERCIAL

## 2017-06-22 ENCOUNTER — APPOINTMENT (OUTPATIENT)
Dept: CT IMAGING | Age: 72
DRG: 386 | End: 2017-06-22
Payer: COMMERCIAL

## 2017-06-22 DIAGNOSIS — K62.5 BRBPR (BRIGHT RED BLOOD PER RECTUM): ICD-10-CM

## 2017-06-22 DIAGNOSIS — I48.91 ATRIAL FIBRILLATION WITH RAPID VENTRICULAR RESPONSE (HCC): ICD-10-CM

## 2017-06-22 DIAGNOSIS — K52.9 ACUTE COLITIS: Primary | ICD-10-CM

## 2017-06-22 PROBLEM — M54.9 CHRONIC BACK PAIN: Chronic | Status: ACTIVE | Noted: 2017-06-22

## 2017-06-22 PROBLEM — G89.29 CHRONIC BACK PAIN: Chronic | Status: ACTIVE | Noted: 2017-06-22

## 2017-06-22 LAB
ABSOLUTE EOS #: 0.1 K/UL (ref 0–0.4)
ABSOLUTE LYMPH #: 3.2 K/UL (ref 1–4.8)
ABSOLUTE MONO #: 1 K/UL (ref 0.1–1.2)
ALBUMIN SERPL-MCNC: 3.1 G/DL (ref 3.5–5.2)
ALBUMIN/GLOBULIN RATIO: 0.8 (ref 1–2.5)
ALP BLD-CCNC: 117 U/L (ref 35–104)
ALT SERPL-CCNC: 9 U/L (ref 5–33)
ANION GAP SERPL CALCULATED.3IONS-SCNC: 15 MMOL/L (ref 9–17)
AST SERPL-CCNC: 17 U/L
BASOPHILS # BLD: 0 %
BASOPHILS ABSOLUTE: 0 K/UL (ref 0–0.2)
BILIRUB SERPL-MCNC: 0.21 MG/DL (ref 0.3–1.2)
BNP INTERPRETATION: NORMAL
BUN BLDV-MCNC: 11 MG/DL (ref 8–23)
BUN/CREAT BLD: ABNORMAL (ref 9–20)
CALCIUM SERPL-MCNC: 9.2 MG/DL (ref 8.6–10.4)
CHLORIDE BLD-SCNC: 97 MMOL/L (ref 98–107)
CO2: 29 MMOL/L (ref 20–31)
CREAT SERPL-MCNC: 0.87 MG/DL (ref 0.5–0.9)
DIFFERENTIAL TYPE: ABNORMAL
EOSINOPHILS RELATIVE PERCENT: 1 %
GFR AFRICAN AMERICAN: >60 ML/MIN
GFR NON-AFRICAN AMERICAN: >60 ML/MIN
GFR SERPL CREATININE-BSD FRML MDRD: ABNORMAL ML/MIN/{1.73_M2}
GFR SERPL CREATININE-BSD FRML MDRD: ABNORMAL ML/MIN/{1.73_M2}
GLUCOSE BLD-MCNC: 185 MG/DL (ref 70–99)
HCT VFR BLD CALC: 34.6 % (ref 36–46)
HEMOGLOBIN: 10.9 G/DL (ref 12–16)
INR BLD: 1
LACTIC ACID, WHOLE BLOOD: 3.4 MMOL/L (ref 0.7–2.1)
LACTIC ACID: ABNORMAL MMOL/L
LIPASE: 13 U/L (ref 13–60)
LYMPHOCYTES # BLD: 29 %
MCH RBC QN AUTO: 27.6 PG (ref 26–34)
MCHC RBC AUTO-ENTMCNC: 31.6 G/DL (ref 31–37)
MCV RBC AUTO: 87.2 FL (ref 80–100)
MONOCYTES # BLD: 9 %
PARTIAL THROMBOPLASTIN TIME: 16.8 SEC (ref 21.3–31.3)
PDW BLD-RTO: 15 % (ref 12.5–15.4)
PLATELET # BLD: 439 K/UL (ref 140–450)
PLATELET ESTIMATE: ABNORMAL
PMV BLD AUTO: 8.2 FL (ref 6–12)
POC TROPONIN I: 0.01 NG/ML (ref 0–0.1)
POC TROPONIN INTERP: NORMAL
POTASSIUM SERPL-SCNC: 3.9 MMOL/L (ref 3.7–5.3)
PRO-BNP: 227 PG/ML
PROTHROMBIN TIME: 10.8 SEC (ref 9.4–12.6)
RBC # BLD: 3.97 M/UL (ref 4–5.2)
RBC # BLD: ABNORMAL 10*6/UL
SEG NEUTROPHILS: 61 %
SEGMENTED NEUTROPHILS ABSOLUTE COUNT: 6.8 K/UL (ref 1.8–7.7)
SODIUM BLD-SCNC: 141 MMOL/L (ref 135–144)
TOTAL PROTEIN: 7.2 G/DL (ref 6.4–8.3)
WBC # BLD: 11.1 K/UL (ref 3.5–11)
WBC # BLD: ABNORMAL 10*3/UL

## 2017-06-22 PROCEDURE — 74177 CT ABD & PELVIS W/CONTRAST: CPT

## 2017-06-22 PROCEDURE — 93005 ELECTROCARDIOGRAM TRACING: CPT

## 2017-06-22 PROCEDURE — 2580000003 HC RX 258: Performed by: INTERNAL MEDICINE

## 2017-06-22 PROCEDURE — 2580000003 HC RX 258: Performed by: EMERGENCY MEDICINE

## 2017-06-22 PROCEDURE — 6360000002 HC RX W HCPCS: Performed by: INTERNAL MEDICINE

## 2017-06-22 PROCEDURE — 86850 RBC ANTIBODY SCREEN: CPT

## 2017-06-22 PROCEDURE — 85610 PROTHROMBIN TIME: CPT

## 2017-06-22 PROCEDURE — 6360000002 HC RX W HCPCS: Performed by: EMERGENCY MEDICINE

## 2017-06-22 PROCEDURE — 6360000004 HC RX CONTRAST MEDICATION: Performed by: EMERGENCY MEDICINE

## 2017-06-22 PROCEDURE — 2060000000 HC ICU INTERMEDIATE R&B

## 2017-06-22 PROCEDURE — 86920 COMPATIBILITY TEST SPIN: CPT

## 2017-06-22 PROCEDURE — 99285 EMERGENCY DEPT VISIT HI MDM: CPT

## 2017-06-22 PROCEDURE — 86901 BLOOD TYPING SEROLOGIC RH(D): CPT

## 2017-06-22 PROCEDURE — 83880 ASSAY OF NATRIURETIC PEPTIDE: CPT

## 2017-06-22 PROCEDURE — 6370000000 HC RX 637 (ALT 250 FOR IP): Performed by: INTERNAL MEDICINE

## 2017-06-22 PROCEDURE — 80053 COMPREHEN METABOLIC PANEL: CPT

## 2017-06-22 PROCEDURE — 84484 ASSAY OF TROPONIN QUANT: CPT

## 2017-06-22 PROCEDURE — 86900 BLOOD TYPING SEROLOGIC ABO: CPT

## 2017-06-22 PROCEDURE — 85025 COMPLETE CBC W/AUTO DIFF WBC: CPT

## 2017-06-22 PROCEDURE — 94640 AIRWAY INHALATION TREATMENT: CPT

## 2017-06-22 PROCEDURE — 83690 ASSAY OF LIPASE: CPT

## 2017-06-22 PROCEDURE — 83605 ASSAY OF LACTIC ACID: CPT

## 2017-06-22 PROCEDURE — 85730 THROMBOPLASTIN TIME PARTIAL: CPT

## 2017-06-22 PROCEDURE — 96374 THER/PROPH/DIAG INJ IV PUSH: CPT

## 2017-06-22 PROCEDURE — 71010 XR CHEST PORTABLE: CPT

## 2017-06-22 RX ORDER — ALBUTEROL SULFATE 2.5 MG/3ML
2.5 SOLUTION RESPIRATORY (INHALATION) EVERY 6 HOURS PRN
Status: DISCONTINUED | OUTPATIENT
Start: 2017-06-22 | End: 2017-06-27 | Stop reason: HOSPADM

## 2017-06-22 RX ORDER — MAGNESIUM SULFATE 1 G/100ML
1 INJECTION INTRAVENOUS PRN
Status: DISCONTINUED | OUTPATIENT
Start: 2017-06-22 | End: 2017-06-27 | Stop reason: HOSPADM

## 2017-06-22 RX ORDER — TIZANIDINE 2 MG/1
2 TABLET ORAL 2 TIMES DAILY
Status: DISCONTINUED | OUTPATIENT
Start: 2017-06-22 | End: 2017-06-27 | Stop reason: HOSPADM

## 2017-06-22 RX ORDER — DULOXETIN HYDROCHLORIDE 30 MG/1
60 CAPSULE, DELAYED RELEASE ORAL 2 TIMES DAILY
Status: DISCONTINUED | OUTPATIENT
Start: 2017-06-22 | End: 2017-06-23

## 2017-06-22 RX ORDER — BUSPIRONE HYDROCHLORIDE 10 MG/1
10 TABLET ORAL 2 TIMES DAILY
Status: DISCONTINUED | OUTPATIENT
Start: 2017-06-22 | End: 2017-06-23

## 2017-06-22 RX ORDER — ONDANSETRON 2 MG/ML
4 INJECTION INTRAMUSCULAR; INTRAVENOUS EVERY 6 HOURS PRN
Status: DISCONTINUED | OUTPATIENT
Start: 2017-06-22 | End: 2017-06-27 | Stop reason: HOSPADM

## 2017-06-22 RX ORDER — MIRTAZAPINE 15 MG/1
15 TABLET, FILM COATED ORAL NIGHTLY
Status: DISCONTINUED | OUTPATIENT
Start: 2017-06-22 | End: 2017-06-27 | Stop reason: HOSPADM

## 2017-06-22 RX ORDER — TRAZODONE HYDROCHLORIDE 50 MG/1
50 TABLET ORAL NIGHTLY
Status: DISCONTINUED | OUTPATIENT
Start: 2017-06-22 | End: 2017-06-23

## 2017-06-22 RX ORDER — FENTANYL CITRATE 50 UG/ML
25 INJECTION, SOLUTION INTRAMUSCULAR; INTRAVENOUS ONCE
Status: COMPLETED | OUTPATIENT
Start: 2017-06-22 | End: 2017-06-22

## 2017-06-22 RX ORDER — OXCARBAZEPINE 150 MG/1
150 TABLET, FILM COATED ORAL 2 TIMES DAILY
Status: DISCONTINUED | OUTPATIENT
Start: 2017-06-22 | End: 2017-06-27 | Stop reason: HOSPADM

## 2017-06-22 RX ORDER — FUROSEMIDE 20 MG/1
20 TABLET ORAL DAILY
Status: DISCONTINUED | OUTPATIENT
Start: 2017-06-23 | End: 2017-06-23

## 2017-06-22 RX ORDER — SODIUM CHLORIDE 0.9 % (FLUSH) 0.9 %
10 SYRINGE (ML) INJECTION EVERY 12 HOURS SCHEDULED
Status: DISCONTINUED | OUTPATIENT
Start: 2017-06-22 | End: 2017-06-27 | Stop reason: HOSPADM

## 2017-06-22 RX ORDER — 0.9 % SODIUM CHLORIDE 0.9 %
1000 INTRAVENOUS SOLUTION INTRAVENOUS ONCE
Status: COMPLETED | OUTPATIENT
Start: 2017-06-22 | End: 2017-06-22

## 2017-06-22 RX ORDER — METOPROLOL TARTRATE 5 MG/5ML
5 INJECTION INTRAVENOUS EVERY 6 HOURS PRN
Status: DISCONTINUED | OUTPATIENT
Start: 2017-06-22 | End: 2017-06-27 | Stop reason: HOSPADM

## 2017-06-22 RX ORDER — ATORVASTATIN CALCIUM 80 MG/1
40 TABLET, FILM COATED ORAL NIGHTLY
Status: DISCONTINUED | OUTPATIENT
Start: 2017-06-22 | End: 2017-06-27 | Stop reason: HOSPADM

## 2017-06-22 RX ORDER — POTASSIUM CHLORIDE 7.45 MG/ML
10 INJECTION INTRAVENOUS PRN
Status: DISCONTINUED | OUTPATIENT
Start: 2017-06-22 | End: 2017-06-27 | Stop reason: HOSPADM

## 2017-06-22 RX ORDER — METOPROLOL SUCCINATE 25 MG/1
12.5 TABLET, EXTENDED RELEASE ORAL ONCE
Status: COMPLETED | OUTPATIENT
Start: 2017-06-23 | End: 2017-06-23

## 2017-06-22 RX ORDER — PREGABALIN 100 MG/1
100 CAPSULE ORAL 3 TIMES DAILY
Status: DISCONTINUED | OUTPATIENT
Start: 2017-06-22 | End: 2017-06-27 | Stop reason: HOSPADM

## 2017-06-22 RX ORDER — METOPROLOL SUCCINATE 25 MG/1
12.5 TABLET, EXTENDED RELEASE ORAL DAILY
Status: DISCONTINUED | OUTPATIENT
Start: 2017-06-23 | End: 2017-06-23

## 2017-06-22 RX ORDER — POTASSIUM CHLORIDE 20MEQ/15ML
40 LIQUID (ML) ORAL PRN
Status: DISCONTINUED | OUTPATIENT
Start: 2017-06-22 | End: 2017-06-27 | Stop reason: HOSPADM

## 2017-06-22 RX ORDER — SODIUM CHLORIDE 9 MG/ML
INJECTION, SOLUTION INTRAVENOUS CONTINUOUS
Status: DISCONTINUED | OUTPATIENT
Start: 2017-06-23 | End: 2017-06-24

## 2017-06-22 RX ORDER — PANTOPRAZOLE SODIUM 40 MG/1
40 TABLET, DELAYED RELEASE ORAL
Status: DISCONTINUED | OUTPATIENT
Start: 2017-06-23 | End: 2017-06-23

## 2017-06-22 RX ORDER — HYDROXYZINE HYDROCHLORIDE 25 MG/1
25 TABLET, FILM COATED ORAL 2 TIMES DAILY
Status: DISCONTINUED | OUTPATIENT
Start: 2017-06-22 | End: 2017-06-27 | Stop reason: HOSPADM

## 2017-06-22 RX ORDER — MAGNESIUM OXIDE 400 MG/1
400 TABLET ORAL 2 TIMES DAILY
Status: DISCONTINUED | OUTPATIENT
Start: 2017-06-22 | End: 2017-06-22 | Stop reason: SDUPTHER

## 2017-06-22 RX ORDER — POTASSIUM CHLORIDE 20 MEQ/1
40 TABLET, EXTENDED RELEASE ORAL PRN
Status: DISCONTINUED | OUTPATIENT
Start: 2017-06-22 | End: 2017-06-27 | Stop reason: HOSPADM

## 2017-06-22 RX ORDER — METOPROLOL SUCCINATE 25 MG/1
25 TABLET, EXTENDED RELEASE ORAL DAILY
Status: DISCONTINUED | OUTPATIENT
Start: 2017-06-23 | End: 2017-06-22

## 2017-06-22 RX ORDER — ACETAMINOPHEN 325 MG/1
650 TABLET ORAL EVERY 4 HOURS PRN
Status: DISCONTINUED | OUTPATIENT
Start: 2017-06-22 | End: 2017-06-22

## 2017-06-22 RX ORDER — M-VIT,TX,IRON,MINS/CALC/FOLIC 27MG-0.4MG
1 TABLET ORAL DAILY
Status: DISCONTINUED | OUTPATIENT
Start: 2017-06-23 | End: 2017-06-27 | Stop reason: HOSPADM

## 2017-06-22 RX ORDER — SODIUM CHLORIDE 0.9 % (FLUSH) 0.9 %
10 SYRINGE (ML) INJECTION PRN
Status: DISCONTINUED | OUTPATIENT
Start: 2017-06-22 | End: 2017-06-27 | Stop reason: HOSPADM

## 2017-06-22 RX ORDER — ACETAMINOPHEN AND CODEINE PHOSPHATE 300; 30 MG/1; MG/1
1 TABLET ORAL EVERY 8 HOURS PRN
Status: DISCONTINUED | OUTPATIENT
Start: 2017-06-22 | End: 2017-06-27 | Stop reason: HOSPADM

## 2017-06-22 RX ADMIN — ATORVASTATIN CALCIUM 40 MG: 80 TABLET, FILM COATED ORAL at 23:25

## 2017-06-22 RX ADMIN — DULOXETINE HYDROCHLORIDE 60 MG: 30 CAPSULE, DELAYED RELEASE ORAL at 23:25

## 2017-06-22 RX ADMIN — FENTANYL CITRATE 25 MCG: 50 INJECTION, SOLUTION INTRAMUSCULAR; INTRAVENOUS at 21:21

## 2017-06-22 RX ADMIN — MOMETASONE FUROATE AND FORMOTEROL FUMARATE DIHYDRATE 2 PUFF: 200; 5 AEROSOL RESPIRATORY (INHALATION) at 22:11

## 2017-06-22 RX ADMIN — HYDROXYZINE HYDROCHLORIDE 25 MG: 25 TABLET, FILM COATED ORAL at 23:23

## 2017-06-22 RX ADMIN — BUSPIRONE HYDROCHLORIDE 10 MG: 10 TABLET ORAL at 23:25

## 2017-06-22 RX ADMIN — ALBUTEROL SULFATE 2.5 MG: 2.5 SOLUTION RESPIRATORY (INHALATION) at 22:27

## 2017-06-22 RX ADMIN — PREGABALIN 100 MG: 100 CAPSULE ORAL at 23:23

## 2017-06-22 RX ADMIN — TRAZODONE HYDROCHLORIDE 50 MG: 50 TABLET ORAL at 23:23

## 2017-06-22 RX ADMIN — SODIUM CHLORIDE 1000 ML: 9 INJECTION, SOLUTION INTRAVENOUS at 16:37

## 2017-06-22 RX ADMIN — IOVERSOL 130 ML: 741 INJECTION INTRA-ARTERIAL; INTRAVENOUS at 18:07

## 2017-06-22 RX ADMIN — MAGNESIUM GLUCONATE 500 MG ORAL TABLET 400 MG: 500 TABLET ORAL at 23:23

## 2017-06-22 RX ADMIN — Medication 10 ML: at 23:28

## 2017-06-22 RX ADMIN — TIZANIDINE 2 MG: 2 TABLET ORAL at 23:23

## 2017-06-22 RX ADMIN — MIRTAZAPINE 15 MG: 15 TABLET, FILM COATED ORAL at 23:23

## 2017-06-22 ASSESSMENT — PAIN SCALES - GENERAL: PAINLEVEL_OUTOF10: 9

## 2017-06-23 ENCOUNTER — APPOINTMENT (OUTPATIENT)
Dept: NUCLEAR MEDICINE | Age: 72
DRG: 386 | End: 2017-06-23
Payer: COMMERCIAL

## 2017-06-23 PROBLEM — R94.31 QT PROLONGATION: Status: ACTIVE | Noted: 2017-06-23

## 2017-06-23 PROBLEM — D62 ANEMIA DUE TO BLOOD LOSS, ACUTE: Status: ACTIVE | Noted: 2017-06-23

## 2017-06-23 LAB
C DIFFICILE TOXINS, PCR: NORMAL
C-REACTIVE PROTEIN: 17.5 MG/L (ref 0–5)
CAMPYLOBACTER PCR: NORMAL
EKG ATRIAL RATE: 117 BPM
EKG ATRIAL RATE: 144 BPM
EKG P AXIS: 90 DEGREES
EKG P-R INTERVAL: 180 MS
EKG Q-T INTERVAL: 344 MS
EKG Q-T INTERVAL: 404 MS
EKG QRS DURATION: 118 MS
EKG QRS DURATION: 120 MS
EKG QTC CALCULATION (BAZETT): 530 MS
EKG QTC CALCULATION (BAZETT): 563 MS
EKG R AXIS: -64 DEGREES
EKG R AXIS: -86 DEGREES
EKG T AXIS: 73 DEGREES
EKG T AXIS: 77 DEGREES
EKG VENTRICULAR RATE: 117 BPM
EKG VENTRICULAR RATE: 143 BPM
ESTIMATED AVERAGE GLUCOSE: 140 MG/DL
GLUCOSE BLD-MCNC: 107 MG/DL (ref 65–105)
GLUCOSE BLD-MCNC: 122 MG/DL (ref 65–105)
GLUCOSE BLD-MCNC: 215 MG/DL (ref 65–105)
GLUCOSE BLD-MCNC: 49 MG/DL (ref 65–105)
GLUCOSE BLD-MCNC: 65 MG/DL (ref 65–105)
GLUCOSE BLD-MCNC: 89 MG/DL (ref 65–105)
GLUCOSE BLD-MCNC: 96 MG/DL (ref 65–105)
HBA1C MFR BLD: 6.5 % (ref 4–6)
HCT VFR BLD CALC: 22.1 % (ref 36–46)
HCT VFR BLD CALC: 23.2 % (ref 36–46)
HCT VFR BLD CALC: 24.9 % (ref 36–46)
HCT VFR BLD CALC: 25.5 % (ref 36–46)
HEMOGLOBIN: 7 G/DL (ref 12–16)
HEMOGLOBIN: 7.4 G/DL (ref 12–16)
HEMOGLOBIN: 8.2 G/DL (ref 12–16)
HEMOGLOBIN: 8.4 G/DL (ref 12–16)
LACTOFERRIN, QUAL: ABNORMAL
MCH RBC QN AUTO: 27.6 PG (ref 26–34)
MCHC RBC AUTO-ENTMCNC: 32.1 G/DL (ref 31–37)
MCV RBC AUTO: 85.8 FL (ref 80–100)
PDW BLD-RTO: 15.1 % (ref 12.5–15.4)
PLATELET # BLD: 296 K/UL (ref 140–450)
PMV BLD AUTO: 7.3 FL (ref 6–12)
POC TROPONIN I: 0 NG/ML (ref 0–0.1)
POC TROPONIN INTERP: NORMAL
PROCALCITONIN: 0.08 NG/ML
RBC # BLD: 2.97 M/UL (ref 4–5.2)
SALMONELLA PCR: NORMAL
SEDIMENTATION RATE, ERYTHROCYTE: 39 MM (ref 0–20)
SHIGATOXIN GENE PCR: NORMAL
SHIGELLA SP PCR: NORMAL
SPECIMEN DESCRIPTION: NORMAL
SPECIMEN: NORMAL
TROPONIN INTERP: NORMAL
TROPONIN T: <0.03 NG/ML
WBC # BLD: 7.4 K/UL (ref 3.5–11)

## 2017-06-23 PROCEDURE — 6370000000 HC RX 637 (ALT 250 FOR IP): Performed by: INTERNAL MEDICINE

## 2017-06-23 PROCEDURE — 78278 ACUTE GI BLOOD LOSS IMAGING: CPT

## 2017-06-23 PROCEDURE — 87329 GIARDIA AG IA: CPT

## 2017-06-23 PROCEDURE — 6370000000 HC RX 637 (ALT 250 FOR IP): Performed by: HOSPITALIST

## 2017-06-23 PROCEDURE — 85018 HEMOGLOBIN: CPT

## 2017-06-23 PROCEDURE — G8978 MOBILITY CURRENT STATUS: HCPCS

## 2017-06-23 PROCEDURE — 83630 LACTOFERRIN FECAL (QUAL): CPT

## 2017-06-23 PROCEDURE — 2580000003 HC RX 258: Performed by: INTERNAL MEDICINE

## 2017-06-23 PROCEDURE — 87505 NFCT AGENT DETECTION GI: CPT

## 2017-06-23 PROCEDURE — 85027 COMPLETE CBC AUTOMATED: CPT

## 2017-06-23 PROCEDURE — 36430 TRANSFUSION BLD/BLD COMPNT: CPT

## 2017-06-23 PROCEDURE — 85651 RBC SED RATE NONAUTOMATED: CPT

## 2017-06-23 PROCEDURE — 97162 PT EVAL MOD COMPLEX 30 MIN: CPT

## 2017-06-23 PROCEDURE — 6360000002 HC RX W HCPCS: Performed by: HOSPITALIST

## 2017-06-23 PROCEDURE — 85014 HEMATOCRIT: CPT

## 2017-06-23 PROCEDURE — A9560 TC99M LABELED RBC: HCPCS | Performed by: INTERNAL MEDICINE

## 2017-06-23 PROCEDURE — 87493 C DIFF AMPLIFIED PROBE: CPT

## 2017-06-23 PROCEDURE — 84145 PROCALCITONIN (PCT): CPT

## 2017-06-23 PROCEDURE — 97535 SELF CARE MNGMENT TRAINING: CPT

## 2017-06-23 PROCEDURE — 6360000002 HC RX W HCPCS

## 2017-06-23 PROCEDURE — 84484 ASSAY OF TROPONIN QUANT: CPT

## 2017-06-23 PROCEDURE — 87328 CRYPTOSPORIDIUM AG IA: CPT

## 2017-06-23 PROCEDURE — 6360000002 HC RX W HCPCS: Performed by: INTERNAL MEDICINE

## 2017-06-23 PROCEDURE — C9113 INJ PANTOPRAZOLE SODIUM, VIA: HCPCS | Performed by: HOSPITALIST

## 2017-06-23 PROCEDURE — G8979 MOBILITY GOAL STATUS: HCPCS

## 2017-06-23 PROCEDURE — P9016 RBC LEUKOCYTES REDUCED: HCPCS

## 2017-06-23 PROCEDURE — 86140 C-REACTIVE PROTEIN: CPT

## 2017-06-23 PROCEDURE — 82947 ASSAY GLUCOSE BLOOD QUANT: CPT

## 2017-06-23 PROCEDURE — 36415 COLL VENOUS BLD VENIPUNCTURE: CPT

## 2017-06-23 PROCEDURE — 94640 AIRWAY INHALATION TREATMENT: CPT

## 2017-06-23 PROCEDURE — 3430000000 HC RX DIAGNOSTIC RADIOPHARMACEUTICAL: Performed by: INTERNAL MEDICINE

## 2017-06-23 PROCEDURE — 94762 N-INVAS EAR/PLS OXIMTRY CONT: CPT

## 2017-06-23 PROCEDURE — 86900 BLOOD TYPING SEROLOGIC ABO: CPT

## 2017-06-23 PROCEDURE — 97166 OT EVAL MOD COMPLEX 45 MIN: CPT

## 2017-06-23 PROCEDURE — 93005 ELECTROCARDIOGRAM TRACING: CPT

## 2017-06-23 PROCEDURE — G8988 SELF CARE GOAL STATUS: HCPCS

## 2017-06-23 PROCEDURE — 83036 HEMOGLOBIN GLYCOSYLATED A1C: CPT

## 2017-06-23 PROCEDURE — 2580000003 HC RX 258: Performed by: HOSPITALIST

## 2017-06-23 PROCEDURE — G8987 SELF CARE CURRENT STATUS: HCPCS

## 2017-06-23 PROCEDURE — 99223 1ST HOSP IP/OBS HIGH 75: CPT | Performed by: INTERNAL MEDICINE

## 2017-06-23 PROCEDURE — 2060000000 HC ICU INTERMEDIATE R&B

## 2017-06-23 PROCEDURE — 97530 THERAPEUTIC ACTIVITIES: CPT

## 2017-06-23 RX ORDER — HYDROCODONE BITARTRATE AND ACETAMINOPHEN 5; 325 MG/1; MG/1
1 TABLET ORAL EVERY 6 HOURS PRN
Status: DISCONTINUED | OUTPATIENT
Start: 2017-06-23 | End: 2017-06-27 | Stop reason: HOSPADM

## 2017-06-23 RX ORDER — 0.9 % SODIUM CHLORIDE 0.9 %
250 INTRAVENOUS SOLUTION INTRAVENOUS ONCE
Status: COMPLETED | OUTPATIENT
Start: 2017-06-23 | End: 2017-06-23

## 2017-06-23 RX ORDER — 0.9 % SODIUM CHLORIDE 0.9 %
250 INTRAVENOUS SOLUTION INTRAVENOUS ONCE
Status: COMPLETED | OUTPATIENT
Start: 2017-06-23 | End: 2017-06-24

## 2017-06-23 RX ORDER — SODIUM CHLORIDE 0.9 % (FLUSH) 0.9 %
10 SYRINGE (ML) INJECTION PRN
Status: DISCONTINUED | OUTPATIENT
Start: 2017-06-23 | End: 2017-06-27 | Stop reason: HOSPADM

## 2017-06-23 RX ORDER — NICOTINE POLACRILEX 4 MG
15 LOZENGE BUCCAL PRN
Status: DISCONTINUED | OUTPATIENT
Start: 2017-06-23 | End: 2017-06-27 | Stop reason: HOSPADM

## 2017-06-23 RX ORDER — PANTOPRAZOLE SODIUM 40 MG/10ML
40 INJECTION, POWDER, LYOPHILIZED, FOR SOLUTION INTRAVENOUS DAILY
Status: DISCONTINUED | OUTPATIENT
Start: 2017-06-23 | End: 2017-06-26

## 2017-06-23 RX ORDER — 0.9 % SODIUM CHLORIDE 0.9 %
1000 INTRAVENOUS SOLUTION INTRAVENOUS ONCE
Status: COMPLETED | OUTPATIENT
Start: 2017-06-23 | End: 2017-06-23

## 2017-06-23 RX ORDER — HEPARIN SODIUM (PORCINE) LOCK FLUSH IV SOLN 100 UNIT/ML 100 UNIT/ML
100 SOLUTION INTRAVENOUS PRN
Status: DISCONTINUED | OUTPATIENT
Start: 2017-06-23 | End: 2017-06-27 | Stop reason: HOSPADM

## 2017-06-23 RX ORDER — CIPROFLOXACIN 2 MG/ML
400 INJECTION, SOLUTION INTRAVENOUS EVERY 12 HOURS
Status: DISCONTINUED | OUTPATIENT
Start: 2017-06-23 | End: 2017-06-23

## 2017-06-23 RX ORDER — MAGNESIUM SULFATE 1 G/100ML
2 INJECTION INTRAVENOUS ONCE
Status: COMPLETED | OUTPATIENT
Start: 2017-06-23 | End: 2017-06-23

## 2017-06-23 RX ORDER — DEXTROSE MONOHYDRATE 25 G/50ML
12.5 INJECTION, SOLUTION INTRAVENOUS PRN
Status: DISCONTINUED | OUTPATIENT
Start: 2017-06-23 | End: 2017-06-27 | Stop reason: HOSPADM

## 2017-06-23 RX ORDER — 0.9 % SODIUM CHLORIDE 0.9 %
10 VIAL (ML) INJECTION DAILY
Status: DISCONTINUED | OUTPATIENT
Start: 2017-06-23 | End: 2017-06-26

## 2017-06-23 RX ORDER — DEXTROSE MONOHYDRATE 50 MG/ML
100 INJECTION, SOLUTION INTRAVENOUS PRN
Status: DISCONTINUED | OUTPATIENT
Start: 2017-06-23 | End: 2017-06-27 | Stop reason: HOSPADM

## 2017-06-23 RX ADMIN — MOMETASONE FUROATE AND FORMOTEROL FUMARATE DIHYDRATE 2 PUFF: 200; 5 AEROSOL RESPIRATORY (INHALATION) at 07:46

## 2017-06-23 RX ADMIN — SODIUM CHLORIDE 250 ML: 9 INJECTION, SOLUTION INTRAVENOUS at 23:45

## 2017-06-23 RX ADMIN — PREGABALIN 100 MG: 100 CAPSULE ORAL at 09:20

## 2017-06-23 RX ADMIN — MIRTAZAPINE 15 MG: 15 TABLET, FILM COATED ORAL at 20:41

## 2017-06-23 RX ADMIN — HYDROCODONE BITARTRATE AND ACETAMINOPHEN 1 TABLET: 5; 325 TABLET ORAL at 17:36

## 2017-06-23 RX ADMIN — HYDROXYZINE HYDROCHLORIDE 25 MG: 25 TABLET, FILM COATED ORAL at 20:41

## 2017-06-23 RX ADMIN — PREGABALIN 100 MG: 100 CAPSULE ORAL at 14:30

## 2017-06-23 RX ADMIN — SODIUM CHLORIDE 1000 ML: 9 INJECTION, SOLUTION INTRAVENOUS at 02:45

## 2017-06-23 RX ADMIN — SODIUM CHLORIDE, PRESERVATIVE FREE 10 ML: 5 INJECTION INTRAVENOUS at 10:30

## 2017-06-23 RX ADMIN — SODIUM CHLORIDE 1000 ML: 9 INJECTION, SOLUTION INTRAVENOUS at 00:41

## 2017-06-23 RX ADMIN — OXCARBAZEPINE 150 MG: 150 TABLET, FILM COATED ORAL at 09:20

## 2017-06-23 RX ADMIN — SODIUM CHLORIDE: 9 INJECTION, SOLUTION INTRAVENOUS at 00:41

## 2017-06-23 RX ADMIN — INSULIN LISPRO 4 UNITS: 100 INJECTION, SOLUTION INTRAVENOUS; SUBCUTANEOUS at 20:42

## 2017-06-23 RX ADMIN — TIOTROPIUM BROMIDE 18 MCG: 18 CAPSULE ORAL; RESPIRATORY (INHALATION) at 07:46

## 2017-06-23 RX ADMIN — SODIUM CHLORIDE 250 ML: 9 INJECTION, SOLUTION INTRAVENOUS at 03:23

## 2017-06-23 RX ADMIN — Medication 10 ML: at 09:22

## 2017-06-23 RX ADMIN — MAGNESIUM SULFATE IN DEXTROSE 2 G: 10 INJECTION, SOLUTION INTRAVENOUS at 02:49

## 2017-06-23 RX ADMIN — MAGNESIUM GLUCONATE 500 MG ORAL TABLET 400 MG: 500 TABLET ORAL at 09:20

## 2017-06-23 RX ADMIN — DEXTROSE MONOHYDRATE 100 ML/HR: 50 INJECTION, SOLUTION INTRAVENOUS at 23:30

## 2017-06-23 RX ADMIN — ACETAMINOPHEN AND CODEINE PHOSPHATE 1 TABLET: 300; 30 TABLET ORAL at 16:28

## 2017-06-23 RX ADMIN — HEPARIN SODIUM (PORCINE) LOCK FLUSH IV SOLN 100 UNIT/ML 100 UNITS: 100 SOLUTION at 10:06

## 2017-06-23 RX ADMIN — ACETAMINOPHEN AND CODEINE PHOSPHATE 1 TABLET: 300; 30 TABLET ORAL at 02:02

## 2017-06-23 RX ADMIN — MULTIPLE VITAMINS W/ MINERALS TAB 1 TABLET: TAB at 09:20

## 2017-06-23 RX ADMIN — ATORVASTATIN CALCIUM 40 MG: 80 TABLET, FILM COATED ORAL at 20:41

## 2017-06-23 RX ADMIN — HYDROXYZINE HYDROCHLORIDE 25 MG: 25 TABLET, FILM COATED ORAL at 09:20

## 2017-06-23 RX ADMIN — TIZANIDINE 2 MG: 2 TABLET ORAL at 20:41

## 2017-06-23 RX ADMIN — Medication 19.3 MILLICURIE: at 10:30

## 2017-06-23 RX ADMIN — Medication 10 ML: at 20:42

## 2017-06-23 RX ADMIN — MOMETASONE FUROATE AND FORMOTEROL FUMARATE DIHYDRATE 2 PUFF: 200; 5 AEROSOL RESPIRATORY (INHALATION) at 19:25

## 2017-06-23 RX ADMIN — METOPROLOL SUCCINATE 12.5 MG: 25 TABLET, FILM COATED, EXTENDED RELEASE ORAL at 00:23

## 2017-06-23 RX ADMIN — PANTOPRAZOLE SODIUM 40 MG: 40 INJECTION, POWDER, FOR SOLUTION INTRAVENOUS at 09:20

## 2017-06-23 RX ADMIN — PREGABALIN 100 MG: 100 CAPSULE ORAL at 20:41

## 2017-06-23 RX ADMIN — TIZANIDINE 2 MG: 2 TABLET ORAL at 09:20

## 2017-06-23 RX ADMIN — MAGNESIUM GLUCONATE 500 MG ORAL TABLET 400 MG: 500 TABLET ORAL at 20:41

## 2017-06-23 ASSESSMENT — PAIN DESCRIPTION - LOCATION
LOCATION: BACK

## 2017-06-23 ASSESSMENT — PAIN SCALES - GENERAL
PAINLEVEL_OUTOF10: 6
PAINLEVEL_OUTOF10: 8
PAINLEVEL_OUTOF10: 8
PAINLEVEL_OUTOF10: 6
PAINLEVEL_OUTOF10: 8
PAINLEVEL_OUTOF10: 8

## 2017-06-23 ASSESSMENT — PAIN DESCRIPTION - PAIN TYPE
TYPE: CHRONIC PAIN
TYPE: CHRONIC PAIN

## 2017-06-23 ASSESSMENT — PAIN DESCRIPTION - ORIENTATION
ORIENTATION: LOWER

## 2017-06-24 LAB
ANION GAP SERPL CALCULATED.3IONS-SCNC: 7 MMOL/L (ref 9–17)
BUN BLDV-MCNC: 7 MG/DL (ref 8–23)
BUN/CREAT BLD: ABNORMAL (ref 9–20)
CALCIUM SERPL-MCNC: 7.7 MG/DL (ref 8.6–10.4)
CHLORIDE BLD-SCNC: 109 MMOL/L (ref 98–107)
CO2: 25 MMOL/L (ref 20–31)
CREAT SERPL-MCNC: 0.58 MG/DL (ref 0.5–0.9)
DIRECT EXAM: NORMAL
GFR AFRICAN AMERICAN: >60 ML/MIN
GFR NON-AFRICAN AMERICAN: >60 ML/MIN
GFR SERPL CREATININE-BSD FRML MDRD: ABNORMAL ML/MIN/{1.73_M2}
GFR SERPL CREATININE-BSD FRML MDRD: ABNORMAL ML/MIN/{1.73_M2}
GLUCOSE BLD-MCNC: 103 MG/DL (ref 70–99)
GLUCOSE BLD-MCNC: 120 MG/DL (ref 65–105)
GLUCOSE BLD-MCNC: 122 MG/DL (ref 65–105)
GLUCOSE BLD-MCNC: 123 MG/DL (ref 65–105)
GLUCOSE BLD-MCNC: 140 MG/DL (ref 65–105)
GLUCOSE BLD-MCNC: 141 MG/DL (ref 65–105)
GLUCOSE BLD-MCNC: 279 MG/DL (ref 65–105)
GLUCOSE BLD-MCNC: 75 MG/DL (ref 65–105)
GLUCOSE BLD-MCNC: 86 MG/DL (ref 65–105)
HCT VFR BLD CALC: 22.5 % (ref 36–46)
HCT VFR BLD CALC: 22.9 % (ref 36–46)
HEMOGLOBIN: 7.2 G/DL (ref 12–16)
HEMOGLOBIN: 7.5 G/DL (ref 12–16)
Lab: NORMAL
MCH RBC QN AUTO: 27.5 PG (ref 26–34)
MCHC RBC AUTO-ENTMCNC: 32.6 G/DL (ref 31–37)
MCV RBC AUTO: 84.5 FL (ref 80–100)
PDW BLD-RTO: 15.9 % (ref 12.5–15.4)
PLATELET # BLD: 235 K/UL (ref 140–450)
PMV BLD AUTO: 7.3 FL (ref 6–12)
POTASSIUM SERPL-SCNC: 3.4 MMOL/L (ref 3.7–5.3)
RBC # BLD: 2.71 M/UL (ref 4–5.2)
SODIUM BLD-SCNC: 141 MMOL/L (ref 135–144)
SPECIMEN DESCRIPTION: NORMAL
STATUS: NORMAL
WBC # BLD: 5.1 K/UL (ref 3.5–11)

## 2017-06-24 PROCEDURE — C9113 INJ PANTOPRAZOLE SODIUM, VIA: HCPCS | Performed by: HOSPITALIST

## 2017-06-24 PROCEDURE — 80048 BASIC METABOLIC PNL TOTAL CA: CPT

## 2017-06-24 PROCEDURE — 99233 SBSQ HOSP IP/OBS HIGH 50: CPT | Performed by: INTERNAL MEDICINE

## 2017-06-24 PROCEDURE — 2580000003 HC RX 258: Performed by: HOSPITALIST

## 2017-06-24 PROCEDURE — 6370000000 HC RX 637 (ALT 250 FOR IP): Performed by: HOSPITALIST

## 2017-06-24 PROCEDURE — 82947 ASSAY GLUCOSE BLOOD QUANT: CPT

## 2017-06-24 PROCEDURE — 85014 HEMATOCRIT: CPT

## 2017-06-24 PROCEDURE — 36415 COLL VENOUS BLD VENIPUNCTURE: CPT

## 2017-06-24 PROCEDURE — 6370000000 HC RX 637 (ALT 250 FOR IP): Performed by: INTERNAL MEDICINE

## 2017-06-24 PROCEDURE — 6360000002 HC RX W HCPCS: Performed by: HOSPITALIST

## 2017-06-24 PROCEDURE — 2060000000 HC ICU INTERMEDIATE R&B

## 2017-06-24 PROCEDURE — 94762 N-INVAS EAR/PLS OXIMTRY CONT: CPT

## 2017-06-24 PROCEDURE — 2580000003 HC RX 258: Performed by: INTERNAL MEDICINE

## 2017-06-24 PROCEDURE — 85018 HEMOGLOBIN: CPT

## 2017-06-24 PROCEDURE — 85027 COMPLETE CBC AUTOMATED: CPT

## 2017-06-24 PROCEDURE — 94640 AIRWAY INHALATION TREATMENT: CPT

## 2017-06-24 RX ORDER — DEXTROSE, SODIUM CHLORIDE, AND POTASSIUM CHLORIDE 5; .45; .15 G/100ML; G/100ML; G/100ML
INJECTION INTRAVENOUS CONTINUOUS
Status: DISCONTINUED | OUTPATIENT
Start: 2017-06-24 | End: 2017-06-26

## 2017-06-24 RX ADMIN — HYDROXYZINE HYDROCHLORIDE 25 MG: 25 TABLET, FILM COATED ORAL at 20:35

## 2017-06-24 RX ADMIN — TIZANIDINE 2 MG: 2 TABLET ORAL at 20:35

## 2017-06-24 RX ADMIN — MAGNESIUM GLUCONATE 500 MG ORAL TABLET 400 MG: 500 TABLET ORAL at 08:25

## 2017-06-24 RX ADMIN — POTASSIUM CHLORIDE, DEXTROSE MONOHYDRATE AND SODIUM CHLORIDE: 150; 5; 450 INJECTION, SOLUTION INTRAVENOUS at 21:27

## 2017-06-24 RX ADMIN — MOMETASONE FUROATE AND FORMOTEROL FUMARATE DIHYDRATE 2 PUFF: 200; 5 AEROSOL RESPIRATORY (INHALATION) at 07:49

## 2017-06-24 RX ADMIN — PREGABALIN 100 MG: 100 CAPSULE ORAL at 08:24

## 2017-06-24 RX ADMIN — POTASSIUM CHLORIDE, DEXTROSE MONOHYDRATE AND SODIUM CHLORIDE: 150; 5; 450 INJECTION, SOLUTION INTRAVENOUS at 08:26

## 2017-06-24 RX ADMIN — TIZANIDINE 2 MG: 2 TABLET ORAL at 08:24

## 2017-06-24 RX ADMIN — POTASSIUM CHLORIDE 40 MEQ: 1500 TABLET, EXTENDED RELEASE ORAL at 08:25

## 2017-06-24 RX ADMIN — MAGNESIUM GLUCONATE 500 MG ORAL TABLET 400 MG: 500 TABLET ORAL at 20:35

## 2017-06-24 RX ADMIN — PREGABALIN 100 MG: 100 CAPSULE ORAL at 20:35

## 2017-06-24 RX ADMIN — HYDROCODONE BITARTRATE AND ACETAMINOPHEN 1 TABLET: 5; 325 TABLET ORAL at 02:09

## 2017-06-24 RX ADMIN — OXCARBAZEPINE 150 MG: 150 TABLET, FILM COATED ORAL at 08:24

## 2017-06-24 RX ADMIN — OXCARBAZEPINE 150 MG: 150 TABLET, FILM COATED ORAL at 20:35

## 2017-06-24 RX ADMIN — PANTOPRAZOLE SODIUM 40 MG: 40 INJECTION, POWDER, FOR SOLUTION INTRAVENOUS at 08:25

## 2017-06-24 RX ADMIN — PREGABALIN 100 MG: 100 CAPSULE ORAL at 14:53

## 2017-06-24 RX ADMIN — ATORVASTATIN CALCIUM 40 MG: 80 TABLET, FILM COATED ORAL at 20:35

## 2017-06-24 RX ADMIN — TIOTROPIUM BROMIDE 18 MCG: 18 CAPSULE ORAL; RESPIRATORY (INHALATION) at 07:49

## 2017-06-24 RX ADMIN — INSULIN LISPRO 6 UNITS: 100 INJECTION, SOLUTION INTRAVENOUS; SUBCUTANEOUS at 12:51

## 2017-06-24 RX ADMIN — HYDROCODONE BITARTRATE AND ACETAMINOPHEN 1 TABLET: 5; 325 TABLET ORAL at 20:16

## 2017-06-24 RX ADMIN — Medication 10 ML: at 08:25

## 2017-06-24 RX ADMIN — MOMETASONE FUROATE AND FORMOTEROL FUMARATE DIHYDRATE 2 PUFF: 200; 5 AEROSOL RESPIRATORY (INHALATION) at 20:20

## 2017-06-24 RX ADMIN — HYDROCODONE BITARTRATE AND ACETAMINOPHEN 1 TABLET: 5; 325 TABLET ORAL at 08:25

## 2017-06-24 RX ADMIN — MIRTAZAPINE 15 MG: 15 TABLET, FILM COATED ORAL at 20:35

## 2017-06-24 RX ADMIN — HYDROXYZINE HYDROCHLORIDE 25 MG: 25 TABLET, FILM COATED ORAL at 08:25

## 2017-06-24 ASSESSMENT — PAIN DESCRIPTION - LOCATION
LOCATION: BACK
LOCATION: BACK

## 2017-06-24 ASSESSMENT — PAIN DESCRIPTION - DESCRIPTORS: DESCRIPTORS: ACHING;CONSTANT

## 2017-06-24 ASSESSMENT — PAIN DESCRIPTION - PROGRESSION: CLINICAL_PROGRESSION: NOT CHANGED

## 2017-06-24 ASSESSMENT — PAIN DESCRIPTION - ORIENTATION: ORIENTATION: MID;LOWER

## 2017-06-24 ASSESSMENT — PAIN SCALES - GENERAL
PAINLEVEL_OUTOF10: 8
PAINLEVEL_OUTOF10: 9
PAINLEVEL_OUTOF10: 0
PAINLEVEL_OUTOF10: 2
PAINLEVEL_OUTOF10: 8

## 2017-06-24 ASSESSMENT — PAIN DESCRIPTION - ONSET: ONSET: GRADUAL

## 2017-06-24 ASSESSMENT — PAIN DESCRIPTION - PAIN TYPE
TYPE: CHRONIC PAIN
TYPE: CHRONIC PAIN

## 2017-06-24 ASSESSMENT — PAIN DESCRIPTION - FREQUENCY: FREQUENCY: CONTINUOUS

## 2017-06-25 LAB
GLUCOSE BLD-MCNC: 121 MG/DL (ref 65–105)
GLUCOSE BLD-MCNC: 136 MG/DL (ref 65–105)
GLUCOSE BLD-MCNC: 287 MG/DL (ref 65–105)
GLUCOSE BLD-MCNC: 311 MG/DL (ref 65–105)
GLUCOSE BLD-MCNC: 39 MG/DL (ref 65–105)
GLUCOSE BLD-MCNC: 92 MG/DL (ref 65–105)
GLUCOSE BLD-MCNC: 97 MG/DL (ref 65–105)
GLUCOSE BLD-MCNC: 99 MG/DL (ref 65–105)
HCT VFR BLD CALC: 23.1 % (ref 36–46)
HEMOGLOBIN: 7.5 G/DL (ref 12–16)
MCH RBC QN AUTO: 27.9 PG (ref 26–34)
MCHC RBC AUTO-ENTMCNC: 32.6 G/DL (ref 31–37)
MCV RBC AUTO: 85.8 FL (ref 80–100)
PDW BLD-RTO: 16.2 % (ref 12.5–15.4)
PLATELET # BLD: 254 K/UL (ref 140–450)
PMV BLD AUTO: 7.4 FL (ref 6–12)
RBC # BLD: 2.69 M/UL (ref 4–5.2)
WBC # BLD: 7.2 K/UL (ref 3.5–11)

## 2017-06-25 PROCEDURE — 6370000000 HC RX 637 (ALT 250 FOR IP): Performed by: HOSPITALIST

## 2017-06-25 PROCEDURE — 2580000003 HC RX 258: Performed by: HOSPITALIST

## 2017-06-25 PROCEDURE — 82947 ASSAY GLUCOSE BLOOD QUANT: CPT

## 2017-06-25 PROCEDURE — 6370000000 HC RX 637 (ALT 250 FOR IP): Performed by: INTERNAL MEDICINE

## 2017-06-25 PROCEDURE — 2060000000 HC ICU INTERMEDIATE R&B

## 2017-06-25 PROCEDURE — 6360000002 HC RX W HCPCS: Performed by: HOSPITALIST

## 2017-06-25 PROCEDURE — 99233 SBSQ HOSP IP/OBS HIGH 50: CPT | Performed by: INTERNAL MEDICINE

## 2017-06-25 PROCEDURE — 85027 COMPLETE CBC AUTOMATED: CPT

## 2017-06-25 PROCEDURE — 36415 COLL VENOUS BLD VENIPUNCTURE: CPT

## 2017-06-25 PROCEDURE — C9113 INJ PANTOPRAZOLE SODIUM, VIA: HCPCS | Performed by: HOSPITALIST

## 2017-06-25 PROCEDURE — 94640 AIRWAY INHALATION TREATMENT: CPT

## 2017-06-25 PROCEDURE — 94762 N-INVAS EAR/PLS OXIMTRY CONT: CPT

## 2017-06-25 PROCEDURE — 2580000003 HC RX 258: Performed by: INTERNAL MEDICINE

## 2017-06-25 RX ADMIN — Medication 10 ML: at 09:26

## 2017-06-25 RX ADMIN — ACETAMINOPHEN AND CODEINE PHOSPHATE 1 TABLET: 300; 30 TABLET ORAL at 11:59

## 2017-06-25 RX ADMIN — MAGNESIUM GLUCONATE 500 MG ORAL TABLET 400 MG: 500 TABLET ORAL at 08:49

## 2017-06-25 RX ADMIN — OXCARBAZEPINE 150 MG: 150 TABLET, FILM COATED ORAL at 20:04

## 2017-06-25 RX ADMIN — PANTOPRAZOLE SODIUM 40 MG: 40 INJECTION, POWDER, FOR SOLUTION INTRAVENOUS at 09:26

## 2017-06-25 RX ADMIN — MIRTAZAPINE 15 MG: 15 TABLET, FILM COATED ORAL at 20:05

## 2017-06-25 RX ADMIN — MOMETASONE FUROATE AND FORMOTEROL FUMARATE DIHYDRATE 2 PUFF: 200; 5 AEROSOL RESPIRATORY (INHALATION) at 08:39

## 2017-06-25 RX ADMIN — HYDROXYZINE HYDROCHLORIDE 25 MG: 25 TABLET, FILM COATED ORAL at 08:49

## 2017-06-25 RX ADMIN — PREGABALIN 100 MG: 100 CAPSULE ORAL at 20:05

## 2017-06-25 RX ADMIN — INSULIN LISPRO 6 UNITS: 100 INJECTION, SOLUTION INTRAVENOUS; SUBCUTANEOUS at 13:01

## 2017-06-25 RX ADMIN — POLYETHYLENE GLYCOL 3350, SODIUM SULFATE ANHYDROUS, SODIUM BICARBONATE, SODIUM CHLORIDE, POTASSIUM CHLORIDE 2000 ML: 236; 22.74; 6.74; 5.86; 2.97 POWDER, FOR SOLUTION ORAL at 16:49

## 2017-06-25 RX ADMIN — Medication 10 ML: at 09:30

## 2017-06-25 RX ADMIN — HYDROXYZINE HYDROCHLORIDE 25 MG: 25 TABLET, FILM COATED ORAL at 20:04

## 2017-06-25 RX ADMIN — TIOTROPIUM BROMIDE 18 MCG: 18 CAPSULE ORAL; RESPIRATORY (INHALATION) at 08:38

## 2017-06-25 RX ADMIN — HYDROCODONE BITARTRATE AND ACETAMINOPHEN 1 TABLET: 5; 325 TABLET ORAL at 08:51

## 2017-06-25 RX ADMIN — MAGNESIUM GLUCONATE 500 MG ORAL TABLET 400 MG: 500 TABLET ORAL at 20:04

## 2017-06-25 RX ADMIN — TIZANIDINE 2 MG: 2 TABLET ORAL at 08:49

## 2017-06-25 RX ADMIN — DEXTROSE 15 G: 15 GEL ORAL at 16:08

## 2017-06-25 RX ADMIN — HYDROCODONE BITARTRATE AND ACETAMINOPHEN 1 TABLET: 5; 325 TABLET ORAL at 14:49

## 2017-06-25 RX ADMIN — PREGABALIN 100 MG: 100 CAPSULE ORAL at 08:49

## 2017-06-25 RX ADMIN — HYDROCODONE BITARTRATE AND ACETAMINOPHEN 1 TABLET: 5; 325 TABLET ORAL at 21:17

## 2017-06-25 RX ADMIN — OXCARBAZEPINE 150 MG: 150 TABLET, FILM COATED ORAL at 08:49

## 2017-06-25 RX ADMIN — MULTIPLE VITAMINS W/ MINERALS TAB 1 TABLET: TAB at 08:49

## 2017-06-25 RX ADMIN — TIZANIDINE 2 MG: 2 TABLET ORAL at 20:05

## 2017-06-25 RX ADMIN — PREGABALIN 100 MG: 100 CAPSULE ORAL at 14:49

## 2017-06-25 RX ADMIN — POLYETHYLENE GLYCOL 3350, SODIUM SULFATE ANHYDROUS, SODIUM BICARBONATE, SODIUM CHLORIDE, POTASSIUM CHLORIDE 2000 ML: 236; 22.74; 6.74; 5.86; 2.97 POWDER, FOR SOLUTION ORAL at 23:05

## 2017-06-25 RX ADMIN — ATORVASTATIN CALCIUM 40 MG: 80 TABLET, FILM COATED ORAL at 20:05

## 2017-06-25 ASSESSMENT — PAIN DESCRIPTION - ORIENTATION: ORIENTATION: LOWER

## 2017-06-25 ASSESSMENT — PAIN DESCRIPTION - DESCRIPTORS: DESCRIPTORS: SORE;ACHING

## 2017-06-25 ASSESSMENT — PAIN DESCRIPTION - FREQUENCY: FREQUENCY: CONTINUOUS

## 2017-06-25 ASSESSMENT — PAIN SCALES - GENERAL
PAINLEVEL_OUTOF10: 0
PAINLEVEL_OUTOF10: 5
PAINLEVEL_OUTOF10: 8
PAINLEVEL_OUTOF10: 5
PAINLEVEL_OUTOF10: 9
PAINLEVEL_OUTOF10: 4
PAINLEVEL_OUTOF10: 8
PAINLEVEL_OUTOF10: 7

## 2017-06-25 ASSESSMENT — PAIN DESCRIPTION - LOCATION: LOCATION: BACK

## 2017-06-25 ASSESSMENT — PAIN DESCRIPTION - PAIN TYPE: TYPE: CHRONIC PAIN

## 2017-06-26 ENCOUNTER — ANESTHESIA EVENT (OUTPATIENT)
Dept: ENDOSCOPY | Age: 72
DRG: 386 | End: 2017-06-26
Payer: COMMERCIAL

## 2017-06-26 ENCOUNTER — ANESTHESIA (OUTPATIENT)
Dept: ENDOSCOPY | Age: 72
DRG: 386 | End: 2017-06-26
Payer: COMMERCIAL

## 2017-06-26 VITALS
DIASTOLIC BLOOD PRESSURE: 39 MMHG | OXYGEN SATURATION: 99 % | SYSTOLIC BLOOD PRESSURE: 90 MMHG | RESPIRATION RATE: 21 BRPM

## 2017-06-26 LAB
-: NORMAL
ABO/RH: NORMAL
AMORPHOUS: NORMAL
ANION GAP SERPL CALCULATED.3IONS-SCNC: 11 MMOL/L (ref 9–17)
ANTIBODY SCREEN: NEGATIVE
ARM BAND NUMBER: NORMAL
BACTERIA: NORMAL
BILIRUBIN URINE: NEGATIVE
BLD PROD TYP BPU: NORMAL
BUN BLDV-MCNC: 6 MG/DL (ref 8–23)
BUN/CREAT BLD: ABNORMAL (ref 9–20)
CALCIUM SERPL-MCNC: 8.8 MG/DL (ref 8.6–10.4)
CASTS UA: NORMAL /LPF (ref 0–8)
CHLORIDE BLD-SCNC: 104 MMOL/L (ref 98–107)
CO2: 31 MMOL/L (ref 20–31)
COLOR: YELLOW
COMMENT UA: ABNORMAL
CREAT SERPL-MCNC: 0.66 MG/DL (ref 0.5–0.9)
CROSSMATCH RESULT: NORMAL
CRYSTALS, UA: NORMAL /HPF
DISPENSE STATUS BLOOD BANK: NORMAL
EPITHELIAL CELLS UA: NORMAL /HPF (ref 0–5)
EXPIRATION DATE: NORMAL
GFR AFRICAN AMERICAN: >60 ML/MIN
GFR NON-AFRICAN AMERICAN: >60 ML/MIN
GFR SERPL CREATININE-BSD FRML MDRD: ABNORMAL ML/MIN/{1.73_M2}
GFR SERPL CREATININE-BSD FRML MDRD: ABNORMAL ML/MIN/{1.73_M2}
GLUCOSE BLD-MCNC: 101 MG/DL (ref 65–105)
GLUCOSE BLD-MCNC: 137 MG/DL (ref 65–105)
GLUCOSE BLD-MCNC: 152 MG/DL (ref 65–105)
GLUCOSE BLD-MCNC: 176 MG/DL (ref 65–105)
GLUCOSE BLD-MCNC: 78 MG/DL (ref 70–99)
GLUCOSE BLD-MCNC: 96 MG/DL (ref 65–105)
GLUCOSE BLD-MCNC: 97 MG/DL (ref 65–105)
GLUCOSE URINE: NEGATIVE
HCT VFR BLD CALC: 25.2 % (ref 36–46)
HEMOGLOBIN: 8.3 G/DL (ref 12–16)
KETONES, URINE: NEGATIVE
LEUKOCYTE ESTERASE, URINE: ABNORMAL
MCH RBC QN AUTO: 27.8 PG (ref 26–34)
MCHC RBC AUTO-ENTMCNC: 33 G/DL (ref 31–37)
MCV RBC AUTO: 84.2 FL (ref 80–100)
MUCUS: NORMAL
NITRITE, URINE: NEGATIVE
OTHER OBSERVATIONS UA: NORMAL
PDW BLD-RTO: 15.6 % (ref 12.5–15.4)
PH UA: 6 (ref 5–8)
PLATELET # BLD: 343 K/UL (ref 140–450)
PMV BLD AUTO: 7.3 FL (ref 6–12)
POTASSIUM SERPL-SCNC: 4.3 MMOL/L (ref 3.7–5.3)
PROTEIN UA: NEGATIVE
RBC # BLD: 2.99 M/UL (ref 4–5.2)
RBC UA: NORMAL /HPF (ref 0–4)
RENAL EPITHELIAL, UA: NORMAL /HPF
SODIUM BLD-SCNC: 146 MMOL/L (ref 135–144)
SPECIFIC GRAVITY UA: 1.01 (ref 1–1.03)
TRANSFUSION STATUS: NORMAL
TRICHOMONAS: NORMAL
TURBIDITY: CLEAR
UNIT DIVISION: 0
UNIT NUMBER: NORMAL
URINE HGB: NEGATIVE
UROBILINOGEN, URINE: NORMAL
WBC # BLD: 8 K/UL (ref 3.5–11)
WBC UA: NORMAL /HPF (ref 0–5)
YEAST: NORMAL

## 2017-06-26 PROCEDURE — 6370000000 HC RX 637 (ALT 250 FOR IP): Performed by: STUDENT IN AN ORGANIZED HEALTH CARE EDUCATION/TRAINING PROGRAM

## 2017-06-26 PROCEDURE — 80048 BASIC METABOLIC PNL TOTAL CA: CPT

## 2017-06-26 PROCEDURE — 6370000000 HC RX 637 (ALT 250 FOR IP): Performed by: HOSPITALIST

## 2017-06-26 PROCEDURE — 7100000011 HC PHASE II RECOVERY - ADDTL 15 MIN: Performed by: INTERNAL MEDICINE

## 2017-06-26 PROCEDURE — 82947 ASSAY GLUCOSE BLOOD QUANT: CPT

## 2017-06-26 PROCEDURE — 85027 COMPLETE CBC AUTOMATED: CPT

## 2017-06-26 PROCEDURE — 87086 URINE CULTURE/COLONY COUNT: CPT

## 2017-06-26 PROCEDURE — 0DBK8ZX EXCISION OF ASCENDING COLON, VIA NATURAL OR ARTIFICIAL OPENING ENDOSCOPIC, DIAGNOSTIC: ICD-10-PCS | Performed by: INTERNAL MEDICINE

## 2017-06-26 PROCEDURE — 3700000000 HC ANESTHESIA ATTENDED CARE: Performed by: INTERNAL MEDICINE

## 2017-06-26 PROCEDURE — 7100000010 HC PHASE II RECOVERY - FIRST 15 MIN: Performed by: INTERNAL MEDICINE

## 2017-06-26 PROCEDURE — 0DBP8ZX EXCISION OF RECTUM, VIA NATURAL OR ARTIFICIAL OPENING ENDOSCOPIC, DIAGNOSTIC: ICD-10-PCS | Performed by: INTERNAL MEDICINE

## 2017-06-26 PROCEDURE — 6360000002 HC RX W HCPCS: Performed by: NURSE ANESTHETIST, CERTIFIED REGISTERED

## 2017-06-26 PROCEDURE — 81001 URINALYSIS AUTO W/SCOPE: CPT

## 2017-06-26 PROCEDURE — C9113 INJ PANTOPRAZOLE SODIUM, VIA: HCPCS | Performed by: HOSPITALIST

## 2017-06-26 PROCEDURE — 99233 SBSQ HOSP IP/OBS HIGH 50: CPT | Performed by: INTERNAL MEDICINE

## 2017-06-26 PROCEDURE — 2580000003 HC RX 258: Performed by: INTERNAL MEDICINE

## 2017-06-26 PROCEDURE — 36415 COLL VENOUS BLD VENIPUNCTURE: CPT

## 2017-06-26 PROCEDURE — 94762 N-INVAS EAR/PLS OXIMTRY CONT: CPT

## 2017-06-26 PROCEDURE — 6370000000 HC RX 637 (ALT 250 FOR IP): Performed by: INTERNAL MEDICINE

## 2017-06-26 PROCEDURE — 3700000001 HC ADD 15 MINUTES (ANESTHESIA): Performed by: INTERNAL MEDICINE

## 2017-06-26 PROCEDURE — 0DBH8ZX EXCISION OF CECUM, VIA NATURAL OR ARTIFICIAL OPENING ENDOSCOPIC, DIAGNOSTIC: ICD-10-PCS | Performed by: INTERNAL MEDICINE

## 2017-06-26 PROCEDURE — 0DBL8ZX EXCISION OF TRANSVERSE COLON, VIA NATURAL OR ARTIFICIAL OPENING ENDOSCOPIC, DIAGNOSTIC: ICD-10-PCS | Performed by: INTERNAL MEDICINE

## 2017-06-26 PROCEDURE — 87077 CULTURE AEROBIC IDENTIFY: CPT

## 2017-06-26 PROCEDURE — 3609010600 HC COLONOSCOPY POLYPECTOMY SNARE/COLD BIOPSY: Performed by: INTERNAL MEDICINE

## 2017-06-26 PROCEDURE — 2500000003 HC RX 250 WO HCPCS: Performed by: NURSE ANESTHETIST, CERTIFIED REGISTERED

## 2017-06-26 PROCEDURE — 3609010500 HC COLONOSCOPY POLYPECTOMY REMOVAL HOT BIOPSY/STOMA: Performed by: INTERNAL MEDICINE

## 2017-06-26 PROCEDURE — 6360000002 HC RX W HCPCS: Performed by: HOSPITALIST

## 2017-06-26 PROCEDURE — 88305 TISSUE EXAM BY PATHOLOGIST: CPT

## 2017-06-26 PROCEDURE — 87186 SC STD MICRODIL/AGAR DIL: CPT

## 2017-06-26 PROCEDURE — 2060000000 HC ICU INTERMEDIATE R&B

## 2017-06-26 PROCEDURE — 2580000003 HC RX 258: Performed by: NURSE ANESTHETIST, CERTIFIED REGISTERED

## 2017-06-26 RX ORDER — LIDOCAINE HYDROCHLORIDE 10 MG/ML
INJECTION, SOLUTION EPIDURAL; INFILTRATION; INTRACAUDAL; PERINEURAL PRN
Status: DISCONTINUED | OUTPATIENT
Start: 2017-06-26 | End: 2017-06-26 | Stop reason: SDUPTHER

## 2017-06-26 RX ORDER — SODIUM CHLORIDE 9 MG/ML
INJECTION, SOLUTION INTRAVENOUS CONTINUOUS PRN
Status: DISCONTINUED | OUTPATIENT
Start: 2017-06-26 | End: 2017-06-26 | Stop reason: SDUPTHER

## 2017-06-26 RX ORDER — FUROSEMIDE 20 MG/1
20 TABLET ORAL DAILY
Status: DISCONTINUED | OUTPATIENT
Start: 2017-06-26 | End: 2017-06-27 | Stop reason: HOSPADM

## 2017-06-26 RX ORDER — PHENAZOPYRIDINE HYDROCHLORIDE 100 MG/1
200 TABLET, FILM COATED ORAL
Status: DISCONTINUED | OUTPATIENT
Start: 2017-06-26 | End: 2017-06-27 | Stop reason: HOSPADM

## 2017-06-26 RX ORDER — PROPOFOL 10 MG/ML
INJECTION, EMULSION INTRAVENOUS CONTINUOUS PRN
Status: DISCONTINUED | OUTPATIENT
Start: 2017-06-26 | End: 2017-06-26 | Stop reason: SDUPTHER

## 2017-06-26 RX ORDER — UREA 10 %
2 LOTION (ML) TOPICAL NIGHTLY PRN
Status: DISCONTINUED | OUTPATIENT
Start: 2017-06-26 | End: 2017-06-27 | Stop reason: HOSPADM

## 2017-06-26 RX ORDER — PROPOFOL 10 MG/ML
INJECTION, EMULSION INTRAVENOUS PRN
Status: DISCONTINUED | OUTPATIENT
Start: 2017-06-26 | End: 2017-06-26 | Stop reason: SDUPTHER

## 2017-06-26 RX ORDER — PANTOPRAZOLE SODIUM 40 MG/1
40 TABLET, DELAYED RELEASE ORAL
Status: DISCONTINUED | OUTPATIENT
Start: 2017-06-27 | End: 2017-06-27 | Stop reason: HOSPADM

## 2017-06-26 RX ADMIN — PHENAZOPYRIDINE HYDROCHLORIDE 200 MG: 100 TABLET ORAL at 19:06

## 2017-06-26 RX ADMIN — Medication 10 ML: at 09:00

## 2017-06-26 RX ADMIN — MIRTAZAPINE 15 MG: 15 TABLET, FILM COATED ORAL at 21:10

## 2017-06-26 RX ADMIN — PROPOFOL 50 MG: 10 INJECTION, EMULSION INTRAVENOUS at 09:09

## 2017-06-26 RX ADMIN — FUROSEMIDE 20 MG: 20 TABLET ORAL at 15:53

## 2017-06-26 RX ADMIN — PROPOFOL 30 MG: 10 INJECTION, EMULSION INTRAVENOUS at 09:17

## 2017-06-26 RX ADMIN — PROPOFOL 20 MG: 10 INJECTION, EMULSION INTRAVENOUS at 09:28

## 2017-06-26 RX ADMIN — PREGABALIN 100 MG: 100 CAPSULE ORAL at 21:10

## 2017-06-26 RX ADMIN — HYDROCODONE BITARTRATE AND ACETAMINOPHEN 1 TABLET: 5; 325 TABLET ORAL at 11:57

## 2017-06-26 RX ADMIN — HYDROCODONE BITARTRATE AND ACETAMINOPHEN 1 TABLET: 5; 325 TABLET ORAL at 06:10

## 2017-06-26 RX ADMIN — PROPOFOL 75 MCG/KG/MIN: 10 INJECTION, EMULSION INTRAVENOUS at 09:09

## 2017-06-26 RX ADMIN — LIDOCAINE HYDROCHLORIDE 50 MG: 10 INJECTION, SOLUTION EPIDURAL; INFILTRATION; INTRACAUDAL; PERINEURAL at 09:09

## 2017-06-26 RX ADMIN — SODIUM CHLORIDE: 9 INJECTION, SOLUTION INTRAVENOUS at 09:04

## 2017-06-26 RX ADMIN — MAGNESIUM GLUCONATE 500 MG ORAL TABLET 400 MG: 500 TABLET ORAL at 09:00

## 2017-06-26 RX ADMIN — TIZANIDINE 2 MG: 2 TABLET ORAL at 09:00

## 2017-06-26 RX ADMIN — TIZANIDINE 2 MG: 2 TABLET ORAL at 21:10

## 2017-06-26 RX ADMIN — HYDROXYZINE HYDROCHLORIDE 25 MG: 25 TABLET, FILM COATED ORAL at 09:00

## 2017-06-26 RX ADMIN — HYDROCODONE BITARTRATE AND ACETAMINOPHEN 1 TABLET: 5; 325 TABLET ORAL at 19:05

## 2017-06-26 RX ADMIN — INSULIN LISPRO 2 UNITS: 100 INJECTION, SOLUTION INTRAVENOUS; SUBCUTANEOUS at 22:12

## 2017-06-26 RX ADMIN — HYDROXYZINE HYDROCHLORIDE 25 MG: 25 TABLET, FILM COATED ORAL at 21:10

## 2017-06-26 RX ADMIN — MAGNESIUM GLUCONATE 500 MG ORAL TABLET 400 MG: 500 TABLET ORAL at 21:10

## 2017-06-26 RX ADMIN — OXCARBAZEPINE 150 MG: 150 TABLET, FILM COATED ORAL at 09:00

## 2017-06-26 RX ADMIN — ATORVASTATIN CALCIUM 40 MG: 80 TABLET, FILM COATED ORAL at 21:10

## 2017-06-26 RX ADMIN — Medication 2 MG: at 21:10

## 2017-06-26 RX ADMIN — MULTIPLE VITAMINS W/ MINERALS TAB 1 TABLET: TAB at 09:00

## 2017-06-26 RX ADMIN — OXCARBAZEPINE 150 MG: 150 TABLET, FILM COATED ORAL at 21:10

## 2017-06-26 ASSESSMENT — PAIN SCALES - GENERAL
PAINLEVEL_OUTOF10: 0
PAINLEVEL_OUTOF10: 9
PAINLEVEL_OUTOF10: 6
PAINLEVEL_OUTOF10: 9
PAINLEVEL_OUTOF10: 8
PAINLEVEL_OUTOF10: 7
PAINLEVEL_OUTOF10: 0
PAINLEVEL_OUTOF10: 0

## 2017-06-26 ASSESSMENT — ENCOUNTER SYMPTOMS: SHORTNESS OF BREATH: 1

## 2017-06-27 VITALS
HEART RATE: 85 BPM | TEMPERATURE: 98 F | RESPIRATION RATE: 16 BRPM | WEIGHT: 170.19 LBS | DIASTOLIC BLOOD PRESSURE: 59 MMHG | BODY MASS INDEX: 38.29 KG/M2 | OXYGEN SATURATION: 98 % | HEIGHT: 56 IN | SYSTOLIC BLOOD PRESSURE: 135 MMHG

## 2017-06-27 LAB
GLUCOSE BLD-MCNC: 125 MG/DL (ref 65–105)
GLUCOSE BLD-MCNC: 128 MG/DL (ref 65–105)
GLUCOSE BLD-MCNC: 133 MG/DL (ref 65–105)
GLUCOSE BLD-MCNC: 183 MG/DL (ref 65–105)
HCT VFR BLD CALC: 26.5 % (ref 36–46)
HEMOGLOBIN: 8.8 G/DL (ref 12–16)
MCH RBC QN AUTO: 28.2 PG (ref 26–34)
MCHC RBC AUTO-ENTMCNC: 33.3 G/DL (ref 31–37)
MCV RBC AUTO: 84.9 FL (ref 80–100)
PDW BLD-RTO: 16.2 % (ref 12.5–15.4)
PLATELET # BLD: 345 K/UL (ref 140–450)
PMV BLD AUTO: 7.2 FL (ref 6–12)
RBC # BLD: 3.13 M/UL (ref 4–5.2)
SURGICAL PATHOLOGY REPORT: NORMAL
WBC # BLD: 8.5 K/UL (ref 3.5–11)

## 2017-06-27 PROCEDURE — 6370000000 HC RX 637 (ALT 250 FOR IP): Performed by: INTERNAL MEDICINE

## 2017-06-27 PROCEDURE — 6360000002 HC RX W HCPCS: Performed by: INTERNAL MEDICINE

## 2017-06-27 PROCEDURE — 97535 SELF CARE MNGMENT TRAINING: CPT

## 2017-06-27 PROCEDURE — 85027 COMPLETE CBC AUTOMATED: CPT

## 2017-06-27 PROCEDURE — 6370000000 HC RX 637 (ALT 250 FOR IP): Performed by: STUDENT IN AN ORGANIZED HEALTH CARE EDUCATION/TRAINING PROGRAM

## 2017-06-27 PROCEDURE — 36415 COLL VENOUS BLD VENIPUNCTURE: CPT

## 2017-06-27 PROCEDURE — 6370000000 HC RX 637 (ALT 250 FOR IP): Performed by: HOSPITALIST

## 2017-06-27 PROCEDURE — 94762 N-INVAS EAR/PLS OXIMTRY CONT: CPT

## 2017-06-27 PROCEDURE — 6370000000 HC RX 637 (ALT 250 FOR IP): Performed by: NURSE PRACTITIONER

## 2017-06-27 PROCEDURE — 99233 SBSQ HOSP IP/OBS HIGH 50: CPT | Performed by: INTERNAL MEDICINE

## 2017-06-27 PROCEDURE — 94640 AIRWAY INHALATION TREATMENT: CPT

## 2017-06-27 PROCEDURE — 82947 ASSAY GLUCOSE BLOOD QUANT: CPT

## 2017-06-27 RX ORDER — FLUTICASONE PROPIONATE 50 MCG
1 SPRAY, SUSPENSION (ML) NASAL DAILY
Qty: 1 BOTTLE | Refills: 1 | Status: SHIPPED | OUTPATIENT
Start: 2017-06-27

## 2017-06-27 RX ORDER — OXCARBAZEPINE 150 MG/1
150 TABLET, FILM COATED ORAL 2 TIMES DAILY
Qty: 60 TABLET | Refills: 1 | Status: SHIPPED | OUTPATIENT
Start: 2017-06-27 | End: 2017-06-27 | Stop reason: HOSPADM

## 2017-06-27 RX ORDER — PREDNISONE 20 MG/1
40 TABLET ORAL DAILY
Status: DISCONTINUED | OUTPATIENT
Start: 2017-06-27 | End: 2017-06-27 | Stop reason: HOSPADM

## 2017-06-27 RX ORDER — FUROSEMIDE 20 MG/1
20 TABLET ORAL DAILY
Qty: 60 TABLET | Refills: 3 | Status: SHIPPED | OUTPATIENT
Start: 2017-06-27 | End: 2021-05-25 | Stop reason: DRUGHIGH

## 2017-06-27 RX ORDER — PREDNISONE 20 MG/1
40 TABLET ORAL DAILY
Qty: 28 TABLET | Refills: 0 | Status: SHIPPED | OUTPATIENT
Start: 2017-06-27 | End: 2017-07-11

## 2017-06-27 RX ORDER — HYDROCODONE BITARTRATE AND ACETAMINOPHEN 5; 325 MG/1; MG/1
1 TABLET ORAL EVERY 6 HOURS PRN
Qty: 20 TABLET | Refills: 0 | Status: SHIPPED | OUTPATIENT
Start: 2017-06-27 | End: 2017-07-04

## 2017-06-27 RX ORDER — PSEUDOEPHEDRINE HCL 30 MG
100 TABLET ORAL NIGHTLY
Qty: 30 CAPSULE | Refills: 0 | Status: SHIPPED | OUTPATIENT
Start: 2017-06-27

## 2017-06-27 RX ADMIN — PANTOPRAZOLE SODIUM 40 MG: 40 TABLET, DELAYED RELEASE ORAL at 06:47

## 2017-06-27 RX ADMIN — PHENAZOPYRIDINE HYDROCHLORIDE 200 MG: 100 TABLET ORAL at 08:43

## 2017-06-27 RX ADMIN — OXCARBAZEPINE 150 MG: 150 TABLET, FILM COATED ORAL at 08:43

## 2017-06-27 RX ADMIN — PHENAZOPYRIDINE HYDROCHLORIDE 200 MG: 100 TABLET ORAL at 11:22

## 2017-06-27 RX ADMIN — HYDROXYZINE HYDROCHLORIDE 25 MG: 25 TABLET, FILM COATED ORAL at 08:43

## 2017-06-27 RX ADMIN — PREGABALIN 100 MG: 100 CAPSULE ORAL at 08:43

## 2017-06-27 RX ADMIN — MULTIPLE VITAMINS W/ MINERALS TAB 1 TABLET: TAB at 08:43

## 2017-06-27 RX ADMIN — MOMETASONE FUROATE AND FORMOTEROL FUMARATE DIHYDRATE 2 PUFF: 200; 5 AEROSOL RESPIRATORY (INHALATION) at 08:25

## 2017-06-27 RX ADMIN — PREGABALIN 100 MG: 100 CAPSULE ORAL at 15:26

## 2017-06-27 RX ADMIN — HYDROCODONE BITARTRATE AND ACETAMINOPHEN 1 TABLET: 5; 325 TABLET ORAL at 16:00

## 2017-06-27 RX ADMIN — HYDROCODONE BITARTRATE AND ACETAMINOPHEN 1 TABLET: 5; 325 TABLET ORAL at 01:17

## 2017-06-27 RX ADMIN — MAGNESIUM GLUCONATE 500 MG ORAL TABLET 400 MG: 500 TABLET ORAL at 08:46

## 2017-06-27 RX ADMIN — TIZANIDINE 2 MG: 2 TABLET ORAL at 08:43

## 2017-06-27 RX ADMIN — HYDROCODONE BITARTRATE AND ACETAMINOPHEN 1 TABLET: 5; 325 TABLET ORAL at 06:47

## 2017-06-27 RX ADMIN — TIOTROPIUM BROMIDE 18 MCG: 18 CAPSULE ORAL; RESPIRATORY (INHALATION) at 08:26

## 2017-06-27 RX ADMIN — FUROSEMIDE 20 MG: 20 TABLET ORAL at 08:43

## 2017-06-27 RX ADMIN — ALBUTEROL SULFATE 2.5 MG: 2.5 SOLUTION RESPIRATORY (INHALATION) at 08:30

## 2017-06-27 RX ADMIN — PREDNISONE 40 MG: 20 TABLET ORAL at 11:22

## 2017-06-27 ASSESSMENT — PAIN SCALES - GENERAL
PAINLEVEL_OUTOF10: 0
PAINLEVEL_OUTOF10: 0
PAINLEVEL_OUTOF10: 7
PAINLEVEL_OUTOF10: 4
PAINLEVEL_OUTOF10: 7
PAINLEVEL_OUTOF10: 8

## 2017-06-27 ASSESSMENT — PAIN DESCRIPTION - LOCATION: LOCATION: BACK

## 2017-06-28 LAB
CULTURE: ABNORMAL
CULTURE: ABNORMAL
Lab: ABNORMAL
ORGANISM: ABNORMAL
SPECIMEN DESCRIPTION: ABNORMAL
STATUS: ABNORMAL

## 2017-08-08 ENCOUNTER — TELEPHONE (OUTPATIENT)
Dept: ORTHOPEDIC SURGERY | Age: 72
End: 2017-08-08

## 2017-09-07 DIAGNOSIS — M25.511 BILATERAL SHOULDER PAIN, UNSPECIFIED CHRONICITY: Primary | ICD-10-CM

## 2017-09-07 DIAGNOSIS — M25.512 BILATERAL SHOULDER PAIN, UNSPECIFIED CHRONICITY: Primary | ICD-10-CM

## 2018-09-27 PROBLEM — E86.0 DEHYDRATION: Status: RESOLVED | Noted: 2017-01-13 | Resolved: 2018-09-27

## 2019-03-05 ENCOUNTER — HOSPITAL ENCOUNTER (OUTPATIENT)
Age: 74
Setting detail: SPECIMEN
Discharge: HOME | End: 2019-03-05

## 2019-03-05 LAB
ABSOLUTE EOS #: ABNORMAL K/UL (ref 0–0.44)
ABSOLUTE IMMATURE GRANULOCYTE: ABNORMAL K/UL (ref 0–0.3)
ABSOLUTE LYMPH #: ABNORMAL K/UL (ref 1.1–3.7)
ABSOLUTE MONO #: ABNORMAL K/UL (ref 0.1–1.2)
ANION GAP SERPL CALCULATED.3IONS-SCNC: 11 MMOL/L (ref 9–17)
BASOPHILS # BLD: ABNORMAL % (ref 0–2)
BASOPHILS ABSOLUTE: ABNORMAL K/UL (ref 0–0.2)
BUN BLDV-MCNC: 50 MG/DL (ref 8–23)
BUN/CREAT BLD: ABNORMAL (ref 9–20)
CALCIUM SERPL-MCNC: 8.8 MG/DL (ref 8.6–10.4)
CHLORIDE BLD-SCNC: 99 MMOL/L (ref 98–107)
CO2: 31 MMOL/L (ref 20–31)
CREAT SERPL-MCNC: 2.51 MG/DL (ref 0.5–0.9)
DIFFERENTIAL TYPE: ABNORMAL
EOSINOPHILS RELATIVE PERCENT: ABNORMAL % (ref 1–4)
GFR AFRICAN AMERICAN: 23 ML/MIN
GFR NON-AFRICAN AMERICAN: 19 ML/MIN
GFR SERPL CREATININE-BSD FRML MDRD: ABNORMAL ML/MIN/{1.73_M2}
GFR SERPL CREATININE-BSD FRML MDRD: ABNORMAL ML/MIN/{1.73_M2}
GLUCOSE BLD-MCNC: 167 MG/DL (ref 70–99)
HCT VFR BLD CALC: 34.8 % (ref 36.3–47.1)
HEMOGLOBIN: 10.4 G/DL (ref 11.9–15.1)
IMMATURE GRANULOCYTES: ABNORMAL %
LYMPHOCYTES # BLD: ABNORMAL % (ref 24–43)
MCH RBC QN AUTO: 28.7 PG (ref 25.2–33.5)
MCHC RBC AUTO-ENTMCNC: 29.9 G/DL (ref 28.4–34.8)
MCV RBC AUTO: 96.1 FL (ref 82.6–102.9)
MONOCYTES # BLD: ABNORMAL % (ref 3–12)
NRBC AUTOMATED: 0 PER 100 WBC
PDW BLD-RTO: 14.8 % (ref 11.8–14.4)
PLATELET # BLD: 260 K/UL (ref 138–453)
PLATELET ESTIMATE: ABNORMAL
PMV BLD AUTO: 12 FL (ref 8.1–13.5)
POTASSIUM SERPL-SCNC: 4.2 MMOL/L (ref 3.7–5.3)
RBC # BLD: 3.62 M/UL (ref 3.95–5.11)
RBC # BLD: ABNORMAL 10*6/UL
SEG NEUTROPHILS: ABNORMAL % (ref 36–65)
SEGMENTED NEUTROPHILS ABSOLUTE COUNT: ABNORMAL K/UL (ref 1.5–8.1)
SODIUM BLD-SCNC: 141 MMOL/L (ref 135–144)
WBC # BLD: 7.1 K/UL (ref 3.5–11.3)
WBC # BLD: ABNORMAL 10*3/UL

## 2019-03-06 LAB
ESTIMATED AVERAGE GLUCOSE: 126 MG/DL
HBA1C MFR BLD: 6 % (ref 4–6)

## 2021-04-22 ENCOUNTER — HOSPITAL ENCOUNTER (OUTPATIENT)
Age: 76
Setting detail: SPECIMEN
Discharge: HOME | End: 2021-04-22
Payer: COMMERCIAL

## 2021-04-22 PROCEDURE — 81001 URINALYSIS AUTO W/SCOPE: CPT

## 2021-04-23 LAB
-: NORMAL
AMORPHOUS: NORMAL
BACTERIA: NORMAL
BILIRUBIN URINE: NEGATIVE
CASTS UA: NORMAL /LPF (ref 0–8)
COLOR: YELLOW
COMMENT UA: ABNORMAL
CRYSTALS, UA: NORMAL /HPF
EPITHELIAL CELLS UA: NORMAL /HPF (ref 0–5)
GLUCOSE URINE: NEGATIVE
KETONES, URINE: NEGATIVE
LEUKOCYTE ESTERASE, URINE: ABNORMAL
MUCUS: NORMAL
NITRITE, URINE: NEGATIVE
OTHER OBSERVATIONS UA: NORMAL
PH UA: 5 (ref 5–8)
PROTEIN UA: NEGATIVE
RBC UA: NORMAL /HPF (ref 0–4)
RENAL EPITHELIAL, UA: NORMAL /HPF
SPECIFIC GRAVITY UA: 1.01 (ref 1–1.03)
TRICHOMONAS: NORMAL
TURBIDITY: CLEAR
URINE HGB: NEGATIVE
UROBILINOGEN, URINE: NORMAL
WBC UA: NORMAL /HPF (ref 0–5)
YEAST: NORMAL

## 2021-05-25 ENCOUNTER — APPOINTMENT (OUTPATIENT)
Dept: CT IMAGING | Age: 76
DRG: 385 | End: 2021-05-25
Payer: COMMERCIAL

## 2021-05-25 ENCOUNTER — HOSPITAL ENCOUNTER (INPATIENT)
Age: 76
LOS: 11 days | Discharge: NURSING, REHAB OR LONG-TERM CARE | DRG: 385 | End: 2021-06-05
Attending: EMERGENCY MEDICINE | Admitting: INTERNAL MEDICINE
Payer: COMMERCIAL

## 2021-05-25 DIAGNOSIS — K92.2 GASTROINTESTINAL HEMORRHAGE, UNSPECIFIED GASTROINTESTINAL HEMORRHAGE TYPE: Primary | ICD-10-CM

## 2021-05-25 DIAGNOSIS — K51.00 PANCOLITIS (HCC): ICD-10-CM

## 2021-05-25 DIAGNOSIS — F41.9 ANXIETY: ICD-10-CM

## 2021-05-25 DIAGNOSIS — G89.29 CHRONIC IDIOPATHIC PAIN SYNDROME: ICD-10-CM

## 2021-05-25 LAB
ABSOLUTE EOS #: 0.13 K/UL (ref 0–0.44)
ABSOLUTE IMMATURE GRANULOCYTE: 0.13 K/UL (ref 0–0.3)
ABSOLUTE LYMPH #: 1.52 K/UL (ref 1.1–3.7)
ABSOLUTE MONO #: 0.89 K/UL (ref 0.1–1.2)
ANION GAP SERPL CALCULATED.3IONS-SCNC: 10 MMOL/L (ref 9–17)
BASOPHILS # BLD: 0 % (ref 0–2)
BASOPHILS ABSOLUTE: 0 K/UL (ref 0–0.2)
BUN BLDV-MCNC: 28 MG/DL (ref 8–23)
BUN/CREAT BLD: 25 (ref 9–20)
CALCIUM SERPL-MCNC: 8.3 MG/DL (ref 8.6–10.4)
CHLORIDE BLD-SCNC: 105 MMOL/L (ref 98–107)
CO2: 20 MMOL/L (ref 20–31)
CREAT SERPL-MCNC: 1.12 MG/DL (ref 0.5–0.9)
DATE, STOOL #1: ABNORMAL
DATE, STOOL #2: ABNORMAL
DATE, STOOL #3: ABNORMAL
DIFFERENTIAL TYPE: ABNORMAL
EKG ATRIAL RATE: 89 BPM
EKG P AXIS: 86 DEGREES
EKG P-R INTERVAL: 202 MS
EKG Q-T INTERVAL: 424 MS
EKG QRS DURATION: 130 MS
EKG QTC CALCULATION (BAZETT): 515 MS
EKG R AXIS: -60 DEGREES
EKG T AXIS: 72 DEGREES
EKG VENTRICULAR RATE: 89 BPM
EOSINOPHILS RELATIVE PERCENT: 1 % (ref 1–4)
GFR AFRICAN AMERICAN: 58 ML/MIN
GFR NON-AFRICAN AMERICAN: 48 ML/MIN
GFR SERPL CREATININE-BSD FRML MDRD: ABNORMAL ML/MIN/{1.73_M2}
GFR SERPL CREATININE-BSD FRML MDRD: ABNORMAL ML/MIN/{1.73_M2}
GLUCOSE BLD-MCNC: 193 MG/DL (ref 70–99)
HCT VFR BLD CALC: 23.1 % (ref 36.3–47.1)
HCT VFR BLD CALC: 23.4 % (ref 36.3–47.1)
HEMOCCULT SP1 STL QL: POSITIVE
HEMOCCULT SP2 STL QL: ABNORMAL
HEMOCCULT SP3 STL QL: ABNORMAL
HEMOGLOBIN: 6.1 G/DL (ref 11.9–15.1)
HEMOGLOBIN: 6.2 G/DL (ref 11.9–15.1)
IMMATURE GRANULOCYTES: 1 %
INR BLD: 1.3
LACTIC ACID, SEPSIS WHOLE BLOOD: ABNORMAL MMOL/L (ref 0.5–1.9)
LACTIC ACID, SEPSIS: 2.3 MMOL/L (ref 0.5–1.9)
LYMPHOCYTES # BLD: 12 % (ref 24–43)
MCH RBC QN AUTO: 22.1 PG (ref 25.2–33.5)
MCHC RBC AUTO-ENTMCNC: 26.8 G/DL (ref 28.4–34.8)
MCV RBC AUTO: 82.2 FL (ref 82.6–102.9)
MONOCYTES # BLD: 7 % (ref 3–12)
MORPHOLOGY: ABNORMAL
NRBC AUTOMATED: 0 PER 100 WBC
PDW BLD-RTO: 16.7 % (ref 11.8–14.4)
PLATELET # BLD: 376 K/UL (ref 138–453)
PLATELET ESTIMATE: ABNORMAL
PMV BLD AUTO: 10 FL (ref 8.1–13.5)
POTASSIUM SERPL-SCNC: 4.3 MMOL/L (ref 3.7–5.3)
PROCALCITONIN: 0.09 NG/ML
PROTHROMBIN TIME: 16.1 SEC (ref 11.5–14.2)
RBC # BLD: 2.81 M/UL (ref 3.95–5.11)
RBC # BLD: ABNORMAL 10*6/UL
SARS-COV-2, RAPID: NOT DETECTED
SEG NEUTROPHILS: 79 % (ref 36–65)
SEGMENTED NEUTROPHILS ABSOLUTE COUNT: 10.03 K/UL (ref 1.5–8.1)
SODIUM BLD-SCNC: 135 MMOL/L (ref 135–144)
SPECIMEN DESCRIPTION: NORMAL
TIME, STOOL #1: 1252
TIME, STOOL #2: ABNORMAL
TIME, STOOL #3: ABNORMAL
WBC # BLD: 12.7 K/UL (ref 3.5–11.3)
WBC # BLD: ABNORMAL 10*3/UL

## 2021-05-25 PROCEDURE — 86901 BLOOD TYPING SEROLOGIC RH(D): CPT

## 2021-05-25 PROCEDURE — 99285 EMERGENCY DEPT VISIT HI MDM: CPT

## 2021-05-25 PROCEDURE — APPNB180 APP NON BILLABLE TIME > 60 MINS: Performed by: NURSE PRACTITIONER

## 2021-05-25 PROCEDURE — 80048 BASIC METABOLIC PNL TOTAL CA: CPT

## 2021-05-25 PROCEDURE — 86850 RBC ANTIBODY SCREEN: CPT

## 2021-05-25 PROCEDURE — 83605 ASSAY OF LACTIC ACID: CPT

## 2021-05-25 PROCEDURE — 2580000003 HC RX 258: Performed by: NURSE PRACTITIONER

## 2021-05-25 PROCEDURE — 36430 TRANSFUSION BLD/BLD COMPNT: CPT

## 2021-05-25 PROCEDURE — C9113 INJ PANTOPRAZOLE SODIUM, VIA: HCPCS | Performed by: NURSE PRACTITIONER

## 2021-05-25 PROCEDURE — 6370000000 HC RX 637 (ALT 250 FOR IP): Performed by: NURSE PRACTITIONER

## 2021-05-25 PROCEDURE — 93005 ELECTROCARDIOGRAM TRACING: CPT | Performed by: EMERGENCY MEDICINE

## 2021-05-25 PROCEDURE — 85610 PROTHROMBIN TIME: CPT

## 2021-05-25 PROCEDURE — 6360000002 HC RX W HCPCS: Performed by: NURSE PRACTITIONER

## 2021-05-25 PROCEDURE — 85025 COMPLETE CBC W/AUTO DIFF WBC: CPT

## 2021-05-25 PROCEDURE — 74177 CT ABD & PELVIS W/CONTRAST: CPT

## 2021-05-25 PROCEDURE — C9132 KCENTRA, PER I.U.: HCPCS | Performed by: NURSE PRACTITIONER

## 2021-05-25 PROCEDURE — 87449 NOS EACH ORGANISM AG IA: CPT

## 2021-05-25 PROCEDURE — 99222 1ST HOSP IP/OBS MODERATE 55: CPT | Performed by: INTERNAL MEDICINE

## 2021-05-25 PROCEDURE — 87506 IADNA-DNA/RNA PROBE TQ 6-11: CPT

## 2021-05-25 PROCEDURE — 85018 HEMOGLOBIN: CPT

## 2021-05-25 PROCEDURE — P9016 RBC LEUKOCYTES REDUCED: HCPCS

## 2021-05-25 PROCEDURE — 85014 HEMATOCRIT: CPT

## 2021-05-25 PROCEDURE — 2700000000 HC OXYGEN THERAPY PER DAY

## 2021-05-25 PROCEDURE — 96374 THER/PROPH/DIAG INJ IV PUSH: CPT

## 2021-05-25 PROCEDURE — 99222 1ST HOSP IP/OBS MODERATE 55: CPT | Performed by: NURSE PRACTITIONER

## 2021-05-25 PROCEDURE — 93010 ELECTROCARDIOGRAM REPORT: CPT | Performed by: INTERNAL MEDICINE

## 2021-05-25 PROCEDURE — 87635 SARS-COV-2 COVID-19 AMP PRB: CPT

## 2021-05-25 PROCEDURE — 6360000004 HC RX CONTRAST MEDICATION: Performed by: NURSE PRACTITIONER

## 2021-05-25 PROCEDURE — 1200000000 HC SEMI PRIVATE

## 2021-05-25 PROCEDURE — 94640 AIRWAY INHALATION TREATMENT: CPT

## 2021-05-25 PROCEDURE — 86900 BLOOD TYPING SEROLOGIC ABO: CPT

## 2021-05-25 PROCEDURE — 87324 CLOSTRIDIUM AG IA: CPT

## 2021-05-25 PROCEDURE — G0328 FECAL BLOOD SCRN IMMUNOASSAY: HCPCS

## 2021-05-25 PROCEDURE — 86920 COMPATIBILITY TEST SPIN: CPT

## 2021-05-25 PROCEDURE — 84145 PROCALCITONIN (PCT): CPT

## 2021-05-25 RX ORDER — SODIUM CHLORIDE 9 MG/ML
50 INJECTION, SOLUTION INTRAVENOUS ONCE
Status: COMPLETED | OUTPATIENT
Start: 2021-05-25 | End: 2021-05-25

## 2021-05-25 RX ORDER — SODIUM CHLORIDE 9 MG/ML
INJECTION, SOLUTION INTRAVENOUS PRN
Status: DISCONTINUED | OUTPATIENT
Start: 2021-05-25 | End: 2021-05-26 | Stop reason: SDUPTHER

## 2021-05-25 RX ORDER — TIZANIDINE 2 MG/1
2 TABLET ORAL 2 TIMES DAILY
Status: DISCONTINUED | OUTPATIENT
Start: 2021-05-25 | End: 2021-06-05 | Stop reason: HOSPADM

## 2021-05-25 RX ORDER — PANTOPRAZOLE SODIUM 40 MG/1
40 TABLET, DELAYED RELEASE ORAL 2 TIMES DAILY
Status: ON HOLD | COMMUNITY
End: 2021-06-05 | Stop reason: SDUPTHER

## 2021-05-25 RX ORDER — INSULIN ASPART 100 [IU]/ML
0-8 INJECTION, SOLUTION INTRAVENOUS; SUBCUTANEOUS
COMMUNITY

## 2021-05-25 RX ORDER — ALBUTEROL SULFATE 90 UG/1
2 AEROSOL, METERED RESPIRATORY (INHALATION) EVERY 6 HOURS PRN
Status: DISCONTINUED | OUTPATIENT
Start: 2021-05-25 | End: 2021-06-05 | Stop reason: HOSPADM

## 2021-05-25 RX ORDER — ALOGLIPTIN 12.5 MG/1
12.5 TABLET, FILM COATED ORAL DAILY
Status: DISCONTINUED | OUTPATIENT
Start: 2021-05-26 | End: 2021-06-05 | Stop reason: HOSPADM

## 2021-05-25 RX ORDER — CETIRIZINE HYDROCHLORIDE 10 MG/1
10 TABLET ORAL DAILY
Status: DISCONTINUED | OUTPATIENT
Start: 2021-05-25 | End: 2021-06-05 | Stop reason: HOSPADM

## 2021-05-25 RX ORDER — BUPROPION HYDROCHLORIDE 150 MG/1
150 TABLET ORAL EVERY MORNING
COMMUNITY

## 2021-05-25 RX ORDER — ALBUTEROL SULFATE 90 UG/1
2 AEROSOL, METERED RESPIRATORY (INHALATION) EVERY 6 HOURS PRN
COMMUNITY

## 2021-05-25 RX ORDER — GUAIFENESIN 600 MG/1
600 TABLET, EXTENDED RELEASE ORAL 2 TIMES DAILY
Status: CANCELLED | OUTPATIENT
Start: 2021-05-25

## 2021-05-25 RX ORDER — LORAZEPAM 0.5 MG/1
0.25 TABLET ORAL 2 TIMES DAILY PRN
Status: ON HOLD | COMMUNITY
End: 2021-06-05 | Stop reason: SDUPTHER

## 2021-05-25 RX ORDER — FLUTICASONE PROPIONATE 50 MCG
1 SPRAY, SUSPENSION (ML) NASAL DAILY
Status: DISCONTINUED | OUTPATIENT
Start: 2021-05-25 | End: 2021-06-05 | Stop reason: HOSPADM

## 2021-05-25 RX ORDER — PANTOPRAZOLE SODIUM 40 MG/10ML
40 INJECTION, POWDER, LYOPHILIZED, FOR SOLUTION INTRAVENOUS ONCE
Status: COMPLETED | OUTPATIENT
Start: 2021-05-25 | End: 2021-05-25

## 2021-05-25 RX ORDER — ACETAMINOPHEN 650 MG/1
650 SUPPOSITORY RECTAL EVERY 6 HOURS PRN
Status: DISCONTINUED | OUTPATIENT
Start: 2021-05-25 | End: 2021-06-05 | Stop reason: HOSPADM

## 2021-05-25 RX ORDER — NORTRIPTYLINE HYDROCHLORIDE 10 MG/1
10 CAPSULE ORAL NIGHTLY
COMMUNITY

## 2021-05-25 RX ORDER — SODIUM CHLORIDE 9 MG/ML
INJECTION, SOLUTION INTRAVENOUS CONTINUOUS
Status: DISCONTINUED | OUTPATIENT
Start: 2021-05-25 | End: 2021-05-28

## 2021-05-25 RX ORDER — PANTOPRAZOLE SODIUM 40 MG/1
40 TABLET, DELAYED RELEASE ORAL
Status: DISCONTINUED | OUTPATIENT
Start: 2021-05-26 | End: 2021-06-05 | Stop reason: HOSPADM

## 2021-05-25 RX ORDER — BUDESONIDE AND FORMOTEROL FUMARATE DIHYDRATE 160; 4.5 UG/1; UG/1
1 AEROSOL RESPIRATORY (INHALATION) 2 TIMES DAILY
Status: DISCONTINUED | OUTPATIENT
Start: 2021-05-25 | End: 2021-06-05 | Stop reason: HOSPADM

## 2021-05-25 RX ORDER — PROMETHAZINE HYDROCHLORIDE 12.5 MG/1
12.5 TABLET ORAL EVERY 6 HOURS PRN
Status: DISCONTINUED | OUTPATIENT
Start: 2021-05-25 | End: 2021-06-05 | Stop reason: HOSPADM

## 2021-05-25 RX ORDER — NORTRIPTYLINE HYDROCHLORIDE 10 MG/1
10 CAPSULE ORAL NIGHTLY
Status: DISCONTINUED | OUTPATIENT
Start: 2021-05-25 | End: 2021-06-05 | Stop reason: HOSPADM

## 2021-05-25 RX ORDER — TRAZODONE HYDROCHLORIDE 50 MG/1
50 TABLET ORAL NIGHTLY
Status: CANCELLED | OUTPATIENT
Start: 2021-05-25

## 2021-05-25 RX ORDER — POLYETHYLENE GLYCOL 3350 17 G/17G
17 POWDER, FOR SOLUTION ORAL DAILY
COMMUNITY

## 2021-05-25 RX ORDER — VITAMIN B COMPLEX
2000 TABLET ORAL
Status: DISCONTINUED | OUTPATIENT
Start: 2021-05-25 | End: 2021-06-05 | Stop reason: HOSPADM

## 2021-05-25 RX ORDER — LANOLIN ALCOHOL/MO/W.PET/CERES
325 CREAM (GRAM) TOPICAL
Status: DISCONTINUED | OUTPATIENT
Start: 2021-05-25 | End: 2021-06-05 | Stop reason: HOSPADM

## 2021-05-25 RX ORDER — ACETAMINOPHEN 325 MG/1
650 TABLET ORAL EVERY 6 HOURS PRN
Status: DISCONTINUED | OUTPATIENT
Start: 2021-05-25 | End: 2021-06-05 | Stop reason: HOSPADM

## 2021-05-25 RX ORDER — ZONISAMIDE 25 MG/1
25 CAPSULE ORAL DAILY
COMMUNITY

## 2021-05-25 RX ORDER — 0.9 % SODIUM CHLORIDE 0.9 %
80 INTRAVENOUS SOLUTION INTRAVENOUS ONCE
Status: COMPLETED | OUTPATIENT
Start: 2021-05-25 | End: 2021-05-25

## 2021-05-25 RX ORDER — MIRTAZAPINE 15 MG/1
15 TABLET, FILM COATED ORAL NIGHTLY
Status: DISCONTINUED | OUTPATIENT
Start: 2021-05-25 | End: 2021-06-05 | Stop reason: HOSPADM

## 2021-05-25 RX ORDER — ATORVASTATIN CALCIUM 40 MG/1
40 TABLET, FILM COATED ORAL NIGHTLY
Status: DISCONTINUED | OUTPATIENT
Start: 2021-05-25 | End: 2021-06-05 | Stop reason: HOSPADM

## 2021-05-25 RX ORDER — FUROSEMIDE 20 MG/1
20 TABLET ORAL DAILY
Status: DISCONTINUED | OUTPATIENT
Start: 2021-05-25 | End: 2021-05-28

## 2021-05-25 RX ORDER — INSULIN GLARGINE 100 [IU]/ML
10 INJECTION, SOLUTION SUBCUTANEOUS NIGHTLY
Status: DISCONTINUED | OUTPATIENT
Start: 2021-05-25 | End: 2021-05-27

## 2021-05-25 RX ORDER — SODIUM CHLORIDE 0.9 % (FLUSH) 0.9 %
10 SYRINGE (ML) INJECTION PRN
Status: DISCONTINUED | OUTPATIENT
Start: 2021-05-25 | End: 2021-05-25 | Stop reason: SDUPTHER

## 2021-05-25 RX ORDER — LORAZEPAM 0.5 MG/1
0.25 TABLET ORAL 2 TIMES DAILY PRN
Status: DISCONTINUED | OUTPATIENT
Start: 2021-05-25 | End: 2021-06-05 | Stop reason: HOSPADM

## 2021-05-25 RX ORDER — UMECLIDINIUM 62.5 UG/1
1 AEROSOL, POWDER ORAL DAILY
COMMUNITY

## 2021-05-25 RX ORDER — DULOXETIN HYDROCHLORIDE 30 MG/1
90 CAPSULE, DELAYED RELEASE ORAL DAILY
COMMUNITY

## 2021-05-25 RX ORDER — SODIUM CHLORIDE 0.9 % (FLUSH) 0.9 %
5-40 SYRINGE (ML) INJECTION EVERY 12 HOURS SCHEDULED
Status: DISCONTINUED | OUTPATIENT
Start: 2021-05-25 | End: 2021-05-27 | Stop reason: SDUPTHER

## 2021-05-25 RX ORDER — FUROSEMIDE 40 MG/1
40 TABLET ORAL DAILY
Status: ON HOLD | COMMUNITY
End: 2021-06-05 | Stop reason: SDUPTHER

## 2021-05-25 RX ORDER — ONDANSETRON 2 MG/ML
4 INJECTION INTRAMUSCULAR; INTRAVENOUS EVERY 6 HOURS PRN
Status: DISCONTINUED | OUTPATIENT
Start: 2021-05-25 | End: 2021-06-05 | Stop reason: HOSPADM

## 2021-05-25 RX ORDER — PHENOL 1.4 %
10 AEROSOL, SPRAY (ML) MUCOUS MEMBRANE NIGHTLY
COMMUNITY

## 2021-05-25 RX ORDER — DOCUSATE SODIUM 100 MG/1
100 CAPSULE, LIQUID FILLED ORAL NIGHTLY
Status: DISCONTINUED | OUTPATIENT
Start: 2021-05-25 | End: 2021-05-28

## 2021-05-25 RX ORDER — SODIUM CHLORIDE 0.9 % (FLUSH) 0.9 %
5-40 SYRINGE (ML) INJECTION PRN
Status: DISCONTINUED | OUTPATIENT
Start: 2021-05-25 | End: 2021-06-05 | Stop reason: HOSPADM

## 2021-05-25 RX ORDER — BUPROPION HYDROCHLORIDE 150 MG/1
150 TABLET ORAL EVERY MORNING
Status: DISCONTINUED | OUTPATIENT
Start: 2021-05-26 | End: 2021-06-05 | Stop reason: HOSPADM

## 2021-05-25 RX ORDER — LOPERAMIDE HYDROCHLORIDE 2 MG/1
2 CAPSULE ORAL PRN
Status: DISCONTINUED | OUTPATIENT
Start: 2021-05-25 | End: 2021-06-04

## 2021-05-25 RX ORDER — ALBUTEROL SULFATE 2.5 MG/3ML
2.5 SOLUTION RESPIRATORY (INHALATION) EVERY 6 HOURS PRN
Status: DISCONTINUED | OUTPATIENT
Start: 2021-05-25 | End: 2021-05-29

## 2021-05-25 RX ORDER — DULOXETIN HYDROCHLORIDE 60 MG/1
60 CAPSULE, DELAYED RELEASE ORAL 2 TIMES DAILY
Status: CANCELLED | OUTPATIENT
Start: 2021-05-25

## 2021-05-25 RX ORDER — NICOTINE POLACRILEX 4 MG
15 LOZENGE BUCCAL PRN
Status: DISCONTINUED | OUTPATIENT
Start: 2021-05-25 | End: 2021-05-27 | Stop reason: SDUPTHER

## 2021-05-25 RX ORDER — DULAGLUTIDE 1.5 MG/.5ML
1.5 INJECTION, SOLUTION SUBCUTANEOUS WEEKLY
COMMUNITY

## 2021-05-25 RX ORDER — MORPHINE SULFATE 4 MG/ML
4 INJECTION, SOLUTION INTRAMUSCULAR; INTRAVENOUS ONCE
Status: COMPLETED | OUTPATIENT
Start: 2021-05-25 | End: 2021-05-25

## 2021-05-25 RX ORDER — LEVOCETIRIZINE DIHYDROCHLORIDE 5 MG/1
2.5 TABLET, FILM COATED ORAL NIGHTLY
COMMUNITY

## 2021-05-25 RX ORDER — BUSPIRONE HYDROCHLORIDE 10 MG/1
10 TABLET ORAL 2 TIMES DAILY
Status: CANCELLED | OUTPATIENT
Start: 2021-05-25

## 2021-05-25 RX ORDER — CETIRIZINE HYDROCHLORIDE 10 MG/1
10 TABLET ORAL DAILY
Status: CANCELLED | OUTPATIENT
Start: 2021-05-25

## 2021-05-25 RX ORDER — HYDROXYZINE HYDROCHLORIDE 25 MG/1
25 TABLET, FILM COATED ORAL 2 TIMES DAILY
Status: DISCONTINUED | OUTPATIENT
Start: 2021-05-25 | End: 2021-06-05 | Stop reason: HOSPADM

## 2021-05-25 RX ORDER — SODIUM CHLORIDE 9 MG/ML
25 INJECTION, SOLUTION INTRAVENOUS PRN
Status: DISCONTINUED | OUTPATIENT
Start: 2021-05-25 | End: 2021-06-05 | Stop reason: HOSPADM

## 2021-05-25 RX ADMIN — IOPAMIDOL 75 ML: 755 INJECTION, SOLUTION INTRAVENOUS at 14:14

## 2021-05-25 RX ADMIN — POLYETHYLENE GLYCOL 3350, SODIUM SULFATE ANHYDROUS, SODIUM BICARBONATE, SODIUM CHLORIDE, POTASSIUM CHLORIDE 4000 ML: 236; 22.74; 6.74; 5.86; 2.97 POWDER, FOR SOLUTION ORAL at 22:47

## 2021-05-25 RX ADMIN — PANTOPRAZOLE SODIUM 40 MG: 40 INJECTION, POWDER, FOR SOLUTION INTRAVENOUS at 15:09

## 2021-05-25 RX ADMIN — SODIUM CHLORIDE 50 ML: 9 INJECTION, SOLUTION INTRAVENOUS at 15:09

## 2021-05-25 RX ADMIN — MIRTAZAPINE 15 MG: 15 TABLET, FILM COATED ORAL at 22:44

## 2021-05-25 RX ADMIN — BUDESONIDE AND FORMOTEROL FUMARATE DIHYDRATE 1 PUFF: 160; 4.5 AEROSOL RESPIRATORY (INHALATION) at 21:08

## 2021-05-25 RX ADMIN — NORTRIPTYLINE HYDROCHLORIDE 10 MG: 10 CAPSULE ORAL at 22:44

## 2021-05-25 RX ADMIN — SODIUM CHLORIDE 80 ML: 9 INJECTION, SOLUTION INTRAVENOUS at 14:15

## 2021-05-25 RX ADMIN — SODIUM CHLORIDE, PRESERVATIVE FREE 10 ML: 5 INJECTION INTRAVENOUS at 14:15

## 2021-05-25 RX ADMIN — MORPHINE SULFATE 4 MG: 4 INJECTION, SOLUTION INTRAMUSCULAR; INTRAVENOUS at 15:42

## 2021-05-25 RX ADMIN — SODIUM CHLORIDE: 9 INJECTION, SOLUTION INTRAVENOUS at 22:45

## 2021-05-25 RX ADMIN — TIZANIDINE 2 MG: 2 TABLET ORAL at 22:44

## 2021-05-25 RX ADMIN — PROTHROMBIN, COAGULATION FACTOR VII HUMAN, COAGULATION FACTOR IX HUMAN, COAGULATION FACTOR X HUMAN, PROTEIN C, PROTEIN S HUMAN, AND WATER 4000 UNITS: KIT at 15:12

## 2021-05-25 RX ADMIN — LORAZEPAM 0.25 MG: 0.5 TABLET ORAL at 22:57

## 2021-05-25 ASSESSMENT — PAIN DESCRIPTION - FREQUENCY: FREQUENCY: CONTINUOUS

## 2021-05-25 ASSESSMENT — PAIN DESCRIPTION - PROGRESSION: CLINICAL_PROGRESSION: NOT CHANGED

## 2021-05-25 ASSESSMENT — PAIN SCALES - GENERAL
PAINLEVEL_OUTOF10: 9
PAINLEVEL_OUTOF10: 9
PAINLEVEL_OUTOF10: 4

## 2021-05-25 ASSESSMENT — ENCOUNTER SYMPTOMS
WHEEZING: 0
BLOOD IN STOOL: 1
SORE THROAT: 0
DIARRHEA: 0
RHINORRHEA: 0
SHORTNESS OF BREATH: 0
CONSTIPATION: 0
NAUSEA: 0
CHEST TIGHTNESS: 1
VOMITING: 0
RECTAL PAIN: 1
SINUS PRESSURE: 0
ABDOMINAL PAIN: 0
PHOTOPHOBIA: 0
COUGH: 0
COLOR CHANGE: 0
SINUS PAIN: 0
SHORTNESS OF BREATH: 1

## 2021-05-25 ASSESSMENT — PAIN DESCRIPTION - DESCRIPTORS: DESCRIPTORS: THROBBING

## 2021-05-25 ASSESSMENT — PAIN - FUNCTIONAL ASSESSMENT: PAIN_FUNCTIONAL_ASSESSMENT: PREVENTS OR INTERFERES WITH ALL ACTIVE AND SOME PASSIVE ACTIVITIES

## 2021-05-25 ASSESSMENT — PAIN DESCRIPTION - LOCATION: LOCATION: GENERALIZED

## 2021-05-25 ASSESSMENT — PAIN DESCRIPTION - ONSET: ONSET: ON-GOING

## 2021-05-25 NOTE — PROGRESS NOTES
Transitions of Care Pharmacy Service   Medication Review    The patient's list of current home medications has been reviewed. Source(s) of information: Patient/daughter/[facility] (Talisha/Regino)    Based on information provided by the above source(s), I have updated the patient's home med list as described below. Please review the ACTION REQUESTED section of this note below for any discrepancies on current hospital orders. I changed or updated the following medications on the patient's home medication list:  Removed Buspar 10mg - see Ativan  Trazodone 50mg - alt tx  Multivitamin daily - pt choice  Lyrica 100mg PO TID  Glucose 40% gel  Mucinex 600mg BID  SPiriva 18mcg inhaler - see Incruse     Added Wellbutrin XL 150mg PO daily  Nortriptyline 10mg qHS  Zonisamide 25mg daily  Melatonin 10mg nightly  Cyanocobalamin 7946 mcg daily  Trulicity 0.1TSF/2.0WC inject 0.5ml SC weekly on Wednesdays  Ativan 0.5mg - 0.25mg bid prn  Miralax 17 gm PO daily  Novolog FLexpen 0-8 units tid sliding scale  Levemir 10 units nightly  Incruse elipta 62.mcg - 1 puff daily  Diclofenac Gel 1% - 2 Gm to each shoulder BID  Eliquis 5mg PO BID  Albuterol inhaler 2 puffs q6hrs prn     Adjusted   Cymbalta from 60mg BID to 90 mg daily  Lasix from 20mg to 40mg daily  Ferrous sulfate 325mg from daily to BID  Protonix 40mg from daily to BID  Zyrtec to levocetirizine 5mg - 1/2 tab (2.5mg) nightly  Colace 100mg from nightly to daily prn   Other Notes none         PROVIDER ACTION REQUESTED  Medications that need to be addressed by a physician/nurse practitioner:    Medication Action Requested   Added meds  Lyrica  Buspar  Mucinex  Trazodone  Cymbalta     Please review  Please DC as appropriate        Please adjust to 90 mg daily         Please feel free to call me with any questions about this encounter. Thank you.     SANDER Veronica [facility]   Transitions of Care Pharmacy Service  Phone:  [phone]  Fax: [phone]      Electronically signed by Santy Steele, [facility] on 5/25/2021 at 4:23 PM         Not in a hospital admission.

## 2021-05-25 NOTE — ED PROVIDER NOTES
[address] [place] [address] ED  eMERGENCY dEPARTMENT eNCOUnter      Pt Name: Roxy Millan  MRN: [id number]  [place] 1945  Date of evaluation: 5/25/2021  Provider: [address] [place] [address] NP, [place] [id number]       Chief Complaint   Patient presents with    GI Bleeding         HISTORY OF PRESENT ILLNESS  (Location/Symptom, Timing/Onset, Context/Setting, Quality, Duration, Modifying Factors, Severity.)   Roxy Millan is a 77 y.o. female who presents to the emergency department private vehicle for evaluation of bright red blood per the rectum. Patient states that for the last 3 days has been having rectal bleeding. She states is been bright red blood. She is on Eliquis for history of atrial fibrillation. She has some very mild abdominal pain. She states that she did have some anemia 2 years ago and had to have a blood transfusion. At that time they did a colonoscopy and found a polyp that was removed and some Crohn's disease. She states that she has had some generalized fatigue and malaise states that she has had this rectal bleeding. Nursing Notes were reviewed. ALLERGIES     Aspirin, Ibuprofen, Sulfa antibiotics, and Tape [adhesive tape]    CURRENT MEDICATIONS       Previous Medications    ALBUTEROL (PROVENTIL) (2.5 MG/3ML) 0.083% NEBULIZER SOLUTION    Take 2.5 mg by nebulization every 4 hours as needed for Wheezing     ALBUTEROL SULFATE HFA (VENTOLIN HFA) 108 (90 BASE) MCG/ACT INHALER    Inhale 2 puffs into the lungs every 6 hours as needed for Wheezing    APIXABAN (ELIQUIS) 5 MG TABS TABLET    Take 5 mg by mouth 2 times daily    ATORVASTATIN (LIPITOR) 40 MG TABLET    Take 40 mg by mouth daily. BUDESONIDE-FORMOTEROL (SYMBICORT) 160-4.5 MCG/ACT AERO    Inhale 1 puff into the lungs 2 times daily.     BUPROPION (WELLBUTRIN XL) 150 MG EXTENDED RELEASE TABLET    Take 150 mg by mouth every morning    CHOLECALCIFEROL (VITAMIN D) 2000 UNITS CAPS CAPSULE    Take 2,000 Units by mouth Daily with supper    CYANOCOBALAMIN 1000 MCG TABLET    Take 1,000 mcg by mouth daily    DICLOFENAC SODIUM (VOLTAREN) 1 % GEL    Apply 2 g topically 2 times daily Indications: TO BOTH SHOULDERS. DOCUSATE (COLACE, DULCOLAX) 100 MG CAPS    Take 100 mg by mouth nightly    DULAGLUTIDE (TRULICITY) 1.5 NV/1.8XQ SOPN    Inject 1.5 mg into the skin once a week    DULOXETINE (CYMBALTA) 30 MG EXTENDED RELEASE CAPSULE    Take 90 mg by mouth daily    FERROUS SULFATE 325 (65 FE) MG TABLET    Take 325 mg by mouth 2 times daily (with meals)     FLUTICASONE (FLONASE) 50 MCG/ACT NASAL SPRAY    1 spray by Nasal route daily    FUROSEMIDE (LASIX) 40 MG TABLET    Take 40 mg by mouth daily    HYDROXYZINE (ATARAX) 25 MG TABLET    Take 25 mg by mouth 2 times daily. INSULIN ASPART (NOVOLOG FLEXPEN) 100 UNIT/ML INJECTION PEN    Inject 0-8 Units into the skin 3 times daily (before meals) Indications: sliding scale    INSULIN DETEMIR (LEVEMIR FLEXPEN SC)    Inject 10 Units into the skin 2 times daily    LEVOCETIRIZINE (XYZAL) 5 MG TABLET    Take 2.5 mg by mouth nightly    LOPERAMIDE (LOPERAMIDE A-D) 2 MG TABLET    Take 2 mg by mouth as needed for Diarrhea (takes in the evening)    LORAZEPAM (ATIVAN) 0.5 MG TABLET    Take 0.25 mg by mouth 2 times daily as needed for Anxiety. MAGNESIUM OXIDE (MAG-OX) 400 MG TABLET    Take 400 mg by mouth 2 times daily Indications: do not take at same time as ferrous sulfate    MELATONIN 10 MG TABS    Take 10 mg by mouth nightly    METOPROLOL (TOPROL-XL) 25 MG XL TABLET    Take 12.5 mg by mouth daily Indications: 1/2 tab  12.5 mg     MIRTAZAPINE (REMERON) 15 MG TABLET    Take 15 mg by mouth nightly.     NORTRIPTYLINE (PAMELOR) 10 MG CAPSULE    Take 10 mg by mouth nightly    PANTOPRAZOLE (PROTONIX) 40 MG TABLET    Take 40 mg by mouth 2 times daily    POLYETHYLENE GLYCOL (GLYCOLAX) 17 G PACKET    Take 17 g by mouth daily    SITAGLIPTIN PHOSPHATE (JANUVIA PO)    Take 100 mg by mouth daily     TIZANIDINE (ZANAFLEX) 2 MG TABLET    Take 2 mg by mouth 2 times daily     UMECLIDINIUM BROMIDE (INCRUSE ELLIPTA) 62.5 MCG/INH AEPB    Inhale 1 puff into the lungs daily    ZONISAMIDE (ZONEGRAN) 25 MG CAPSULE    Take 25 mg by mouth daily       PAST MEDICAL HISTORY         Diagnosis Date    ASHLEIGH (acute kidney injury) ([facility] [address] 4/16/2016    Arthritis     Asthma     Bipolar 1 disorder ([facility] [address]     CHF (congestive heart failure) (HCC)     COPD (chronic obstructive pulmonary disease) (HCC)     3 L home O2    Depression     Diabetes mellitus (HCC)     Fibromyalgia     GERD (gastroesophageal reflux disease)     Hyperlipidemia     Hypertension     Pneumonia        SURGICAL HISTORY           Procedure Laterality Date    CHOLECYSTECTOMY      COLONOSCOPY  6/26/2017    COLONOSCOPY POLYPECTOMY REMOVAL HOT BIOPSY/STOMA performed by Ewa Channel, DO at [facility] Left     HYSTERECTOMY      KNEE ARTHROPLASTY Left     CA COLSC FLX W/RMVL OF TUMOR POLYP LESION SNARE TQ  6/26/2017    COLONOSCOPY POLYPECTOMY SNARE/COLD BIOPSY performed by Ewa Channel, DO at [facility]    CA EGD TRANSORAL BIOPSY SINGLE/MULTIPLE N/A 2/14/2017    EGD BIOPSY performed by Kaitlin Gambino MD at [facility]     History reviewed. No pertinent family history. No family status information on file. SOCIAL HISTORY      reports that she has been smoking. She has a 40.00 pack-year smoking history. She has never used smokeless tobacco. She reports that she does not drink alcohol and does not use drugs. REVIEW OF SYSTEMS    (2-9 systems for level 4, 10 or more for level 5)     Review of Systems   Constitutional: Negative for chills, fever and unexpected weight change. HENT: Negative for congestion, rhinorrhea, sinus pressure and sore throat. Respiratory: Negative for cough, shortness of breath and wheezing. Cardiovascular: Negative for chest pain and palpitations. Gastrointestinal: Negative for abdominal pain, constipation, diarrhea, nausea and vomiting. Genitourinary: Negative for dysuria and hematuria. Musculoskeletal: Negative for arthralgias and myalgias. Skin: Negative for color change and rash. Neurological: Negative for dizziness, weakness and headaches. Hematological: Negative for adenopathy. All other systems reviewed and are negative. Except as noted above the remainder of the review of systems was reviewed and negative. PHYSICAL EXAM    (up to 7 for level 4, 8 or more for level 5)     ED Triage Vitals [05/25/21 1236]   BP Temp Temp Source Pulse Resp SpO2 Height Weight   99/73 97.7 °F (36.5 °C) Oral 92 18 96 % -- 172 lb (78 kg)       Physical Exam  Vitals reviewed. Constitutional:       Appearance: She is well-developed. HENT:      Head: Normocephalic and atraumatic. Eyes:      Conjunctiva/sclera: Conjunctivae normal.      Pupils: Pupils are equal, round, and reactive to light. Cardiovascular:      Rate and Rhythm: Normal rate and regular rhythm. Pulmonary:      Effort: Pulmonary effort is normal. No respiratory distress. Breath sounds: Normal breath sounds. No stridor. Abdominal:      General: Bowel sounds are normal.      Palpations: Abdomen is soft. Musculoskeletal:         General: Normal range of motion. Cervical back: Normal range of motion and neck supple. Lymphadenopathy:      Cervical: No cervical adenopathy. Skin:     General: Skin is warm and dry. Findings: No rash. Neurological:      Mental Status: She is alert and oriented to person, place, and time.          RADIOLOGY:   Non-plain film images such as CT, Ultrasound and MRI are read by the radiologist. Plain radiographic images are visualized and preliminarily interpreted by the emergency physician with the below findings:    CT ABDOMEN PELVIS W IV CONTRAST Additional Contrast? None    Result Date: 5/25/2021  EXAMINATION: CT OF THE ABDOMEN AND PELVIS WITH CONTRAST 5/25/2021 2:14 pm TECHNIQUE: CT of the abdomen and pelvis was performed with the administration of intravenous contrast. Multiplanar reformatted images are provided for review. Dose modulation, iterative reconstruction, and/or weight based adjustment of the mA/kV was utilized to reduce the radiation dose to as low as reasonably achievable. COMPARISON: June 22, 2017 HISTORY: ORDERING SYSTEM PROVIDED HISTORY: bloody diarrhea TECHNOLOGIST PROVIDED HISTORY: bloody diarrhea Decision Support Exception - unselect if not a suspected or confirmed emergency medical condition->Emergency Medical Condition (MA) Reason for Exam: Bloody diarrhea Acuity: Acute Type of Exam: Initial Relevant Medical/Surgical History: Hx diabetic, HTN FINDINGS: Lower Chest: Subsegmental atelectasis in the right middle lobe and lingula. There is global cardiomegaly. Tiny hiatus hernia is present. Organs: No focal liver lesion. The gallbladder is surgically absent. No significant biliary ductal dilatation. The spleen is normal in size without focal lesion. The pancreas and the adrenal glands are unremarkable. The kidneys enhance symmetrically without collecting system dilatation. There are bilateral renal cysts. Additional bilateral subcentimeter renal lesions are too small to characterize. GI/Bowel: There is mild circumferential wall thickening of the large bowel consistent with acute pancolitis. No pneumatosis or portal venous gas. No bowel obstruction. The appendix is normal. Pelvis: Remote hysterectomy. The urinary bladder is unremarkable. Peritoneum/Retroperitoneum: No abdominal lymphadenopathy or ascites. Bones/Soft Tissues: Prior lower lumbar decompression-stabilization surgery redemonstrated. Severe thoracolumbar spondylosis essentially stable. Chronic bilateral anterior lower rib fracture deformities. Acute nonspecific pancolitis.   Infectious or inflammatory colitis can be considered, less likely ischemic bleeding per examination with a hemoglobin of 6.2. She was typed and crossed for unit of packed red blood cells. Her original blood pressure was low at 90s over 60s but she was given a liter of IV fluids and her blood pressure has improved. She will be admitted to the hospital for further evaluation follow-up. Case was discussed with internal medicine  Medications   sodium chloride flush 0.9 % injection 10 mL (10 mLs Intravenous Given 5/25/21 1415)   0.9 % sodium chloride bolus (0 mLs Intravenous Stopped 5/25/21 1416)   iopamidol (ISOVUE-370) 76 % injection 75 mL (75 mLs Intravenous Given 5/25/21 1414)   pantoprazole (PROTONIX) injection 40 mg (40 mg Intravenous Given 5/25/21 1509)   prothrombin complex concentrate (human) (KCENTRA) infusion 4,000 Units (4,000 Units Intravenous New Bag 5/25/21 1512)   0.9 % sodium chloride infusion (50 mLs Intravenous New Bag 5/25/21 1509)   morphine sulfate (PF) injection 4 mg (4 mg Intravenous Given 5/25/21 1542)       CONSULTS:  IP CONSULT TO INTERNAL MEDICINE  IP CONSULT TO GI    CRITICAL CARE TIME     Due to the high probability of sudden and clinically significant deterioration in the patient's condition she required highest level of my preparedness to intervene urgently. I provided critical care time including documentation time, medication orders and management, reevaluation, vital sign assessment, ordering and reviewing of of lab tests ordering and reviewing of x-ray studies, and admission orders. Aggregate critical care time is 33 minutes including only time during which I was engaged in work directly related to her care and did not include time spent treating other patients simultaneously. FINAL IMPRESSION      1. Gastrointestinal hemorrhage, unspecified gastrointestinal hemorrhage type    2. Pancolitis [facility])          DISPOSITION/PLAN   DISPOSITION Admitted 05/25/2021 03:21:50 PM      PATIENT REFERRED TO:   No follow-up provider specified.     DISCHARGE MEDICATIONS:

## 2021-05-25 NOTE — H&P
[facility]  Office: [address], DO, Tim Willson, DO, Smiley Flores, DO, Melanie Li, DO, Aurelio Mcmillan MD, Inés Stack MD, Luther Howard MD, Willette Klinefelter, MD, Sadiq Romero MD, Leticia High MD, Maria Elena Laureano MD, Dorothy Smith MD, Apolinar Mae DO, Eladio Mayer MD, Michael Madsen DO, Stephan Palacios MD,  Grant Samaniego DO, Puma Christopher MD, Douglas Navarrete MD, Be Jaimes MD, Rin Agosto MD, [facility]a [facility], [facility], CNP, Dante Hemp, CNP, Yosef Navarro, CNS, Lakisha Ojeda, CNP, Apple Ba, CNP, Shivam Carballo, CNP, Keara Cabezas, CNP, Alla Ortiz, CNP, Narayan Valera PA-C, Harvey Gomez, [facility], Estefania Chi, CNP, Natividad Rdz, CNP, Rosario Smith, CNP, Marland Cowden, CNP, Emily Rodriguez, CNP, [place], [address]    HISTORY AND PHYSICAL EXAMINATION            Date:   5/25/2021  Patient name:  Milvia Mackay  Date of admission:  5/25/2021 12:33 PM  MRN:   [id number]  Account:  [de-identified]  YOB: 1945  PCP:    Christine Campos DO  Room:   [place] [address]  Code Status:    Full Code    Chief Complaint:     Chief Complaint   Patient presents with    GI Bleeding       History Obtained From:     patient    History of Present Illness:     Milvia Mackay is a 77 y.o. Non-/non  female who presents with GI Bleeding   and is admitted to the hospital for the management of GI (gastrointestinal bleed). Patient has a known history of atrial fibrillation for which she takes Eliquis. Approximately 5 years ago patient experienced bright red blood per rectum with acute blood loss anemia and was informed at that time she had inflammatory bowel disease or Crohn's but no definitive diagnosis can be found in chart review. Patient has had no bleeding incident since that time. Over the past 3 days patient has noted bright red blood per rectum.   Patient reports that during that same time. She has began experiencing worsening fatigue and shortness of breath. Patient is now to the point where she has severe shortness of breath with minimal exertion and severe dizziness and weakness while rising. Patient continues to experience dark red stools mixed with bright red blood and liquid diarrhea. CT of the abdomen shows pancolitis. Patient is found to be anemic with a hemoglobin of 6.2. Patient's lactic is minimally elevated at 2.3. Patient's white count 12.7. No fever or chills. No indication for systemic infection at this point patient's abdomen is minimally tender on palpation. Patient presents a bedpan to this provider that has approximately 600 mL of dark red curdled blood and reports that she has been passing this amount multiple times per day for the past 3 days. Patient has received Kcentra by emergency department staff. Patient has not yet received blood transfusion.     Past Medical History:     Past Medical History:   Diagnosis Date    ASHLEIGH (acute kidney injury) ([facility] [address] 4/16/2016    Arthritis     Asthma     Bipolar 1 disorder (HCC)     CHF (congestive heart failure) (HCC)     COPD (chronic obstructive pulmonary disease) (HCC)     3 L home O2    Depression     Diabetes mellitus (HCC)     Fibromyalgia     GERD (gastroesophageal reflux disease)     Hyperlipidemia     Hypertension     Pneumonia         Past Surgical History:     Past Surgical History:   Procedure Laterality Date    CHOLECYSTECTOMY      COLONOSCOPY  6/26/2017    COLONOSCOPY POLYPECTOMY REMOVAL HOT BIOPSY/STOMA performed by Koffi Vogel DO at [email or web address] Endoscopy    FOOT FRACTURE SURGERY Left     HYSTERECTOMY      KNEE ARTHROPLASTY Left     [place] COLSC FLX W/RMVL OF TUMOR POLYP LESION SNARE TQ  6/26/2017    COLONOSCOPY POLYPECTOMY SNARE/COLD BIOPSY performed by Koffi Vogel DO at [email or web address] Endoscopy    [place] EGD TRANSORAL BIOPSY SINGLE/MULTIPLE N/A 2/14/2017    EGD BIOPSY performed by (ZONEGRAN) 25 MG capsule Take 25 mg by mouth daily   Yes Historical Provider, MD   polyethylene glycol (GLYCOLAX) 17 g packet Take 17 g by mouth daily   Yes Historical Provider, MD   buPROPion (WELLBUTRIN XL) 150 MG extended release tablet Take 150 mg by mouth every morning   Yes Historical Provider, MD   fluticasone (FLONASE) 50 MCG/ACT nasal spray 1 spray by Nasal route daily 6/27/17  Yes Anant Hale MD   docusate (COLACE, DULCOLAX) 100 MG CAPS Take 100 mg by mouth nightly  Patient taking differently: Take 100 mg by mouth daily as needed  6/27/17  Yes Anant Hale MD   ferrous sulfate 325 (65 FE) MG tablet Take 325 mg by mouth 2 times daily (with meals)    Yes Historical Provider, MD   Cholecalciferol (VITAMIN D) 2000 UNITS CAPS capsule Take 2,000 Units by mouth Daily with supper   Yes Historical Provider, MD   magnesium oxide (MAG-OX) 400 MG tablet Take 400 mg by mouth 2 times daily Indications: do not take at same time as ferrous sulfate   Yes Historical Provider, MD   SitaGLIPtin Phosphate (JANUVIA PO) Take 100 mg by mouth daily    Yes Historical Provider, MD   mirtazapine (REMERON) 15 MG tablet Take 15 mg by mouth nightly. Yes Historical Provider, MD   albuterol (PROVENTIL) (2.5 MG/3ML) 0.083% nebulizer solution Take 2.5 mg by nebulization every 4 hours as needed for Wheezing    Yes Historical Provider, MD   hydrOXYzine (ATARAX) 25 MG tablet Take 25 mg by mouth 2 times daily. Yes Historical Provider, MD   atorvastatin (LIPITOR) 40 MG tablet Take 40 mg by mouth daily.    Yes Historical Provider, MD   metoprolol (TOPROL-XL) 25 MG XL tablet Take 12.5 mg by mouth daily Indications: 1/2 tab  12.5 mg    Yes Historical Provider, MD   loperamide (LOPERAMIDE A-D) 2 MG tablet Take 2 mg by mouth as needed for Diarrhea (takes in the evening)    Historical Provider, MD   tiZANidine (ZANAFLEX) 2 MG tablet Take 2 mg by mouth 2 times daily     Historical Provider, MD   budesonide-formoterol (SYMBICORT) 160-4.5 MCG/ACT AERO Inhale 1 puff into the lungs 2 times daily. Historical Provider, MD        Allergies:     Aspirin, Ibuprofen, Sulfa antibiotics, Tape [adhesive tape], and Lyrica [pregabalin]    Social History:     Tobacco:    reports that she has been smoking. She has a 40.00 pack-year smoking history. She has never used smokeless tobacco.  Alcohol:      reports no history of alcohol use. Drug Use:  reports no history of drug use. Family History:     Family History   Problem Relation Age of Onset    Heart Failure Mother     Cancer Sister     Cancer Maternal Grandmother     Heart Failure Maternal Grandmother        Review of Systems:     Positive and Negative as described in HPI. Review of Systems   Constitutional: Positive for activity change and fatigue. Negative for fever. HENT: Negative for sinus pressure and sinus pain. Eyes: Negative for photophobia. Respiratory: Positive for chest tightness and shortness of breath. Negative for wheezing. Cardiovascular: Positive for chest pain. Gastrointestinal: Positive for blood in stool and rectal pain. Endocrine: Negative for cold intolerance and heat intolerance. Genitourinary: Negative for urgency. Musculoskeletal: Negative for arthralgias and myalgias. Skin: Negative for color change and rash. Neurological: Positive for weakness. Negative for seizures and speech difficulty. Psychiatric/Behavioral: Negative for self-injury and suicidal ideas. Physical Exam:   BP (!) 102/48   Pulse 92   Temp 97.7 °F (36.5 °C) (Oral)   Resp 16   Wt 172 lb (78 kg)   SpO2 97%   BMI 38.69 kg/m²   Temp (24hrs), Av.7 °F (36.5 °C), Min:97.7 °F (36.5 °C), Max:97.7 °F (36.5 °C)    No results for input(s): POCGLU in the last 72 hours. No intake or output data in the 24 hours ending 21    Physical Exam  Constitutional:       Appearance: She is obese. She is ill-appearing. HENT:      Head: Normocephalic and atraumatic.       Mouth/Throat: Range    Lactic Acid, Sepsis 2.3 (H) 0.5 - 1.9 mmol/L    Lactic Acid, Sepsis, Whole Blood NOT REPORTED 0.5 - 1.9 mmol/L   CBC Auto Differential    Collection Time: 05/25/21  1:05 PM   Result Value Ref Range    WBC 12.7 (H) 3.5 - 11.3 k/uL    RBC 2.81 (L) 3.95 - 5.11 m/uL    Hemoglobin 6.2 (LL) 11.9 - 15.1 g/dL    Hematocrit 23.1 (L) 36.3 - 47.1 %    MCV 82.2 (L) 82.6 - 102.9 fL    MCH 22.1 (L) 25.2 - 33.5 pg    MCHC 26.8 (L) 28.4 - 34.8 g/dL    RDW 16.7 (H) 11.8 - 14.4 %    Platelets 722 202 - 161 k/uL    MPV 10.0 8.1 - 13.5 fL    NRBC Automated 0.0 0.0 per 100 WBC    Differential Type NOT REPORTED     WBC Morphology NOT REPORTED     RBC Morphology NOT REPORTED     Platelet Estimate NOT REPORTED     Seg Neutrophils 79 (H) 36 - 65 %    Lymphocytes 12 (L) 24 - 43 %    Monocytes 7 3 - 12 %    Eosinophils % 1 1 - 4 %    Basophils 0 0 - 2 %    Immature Granulocytes 1 (H) 0 %    Segs Absolute 10.03 (H) 1.50 - 8.10 k/uL    Absolute Lymph # 1.52 1.10 - 3.70 k/uL    Absolute Mono # 0.89 0.10 - 1.20 k/uL    Absolute Eos # 0.13 0.00 - 0.44 k/uL    Basophils Absolute 0.00 0.00 - 0.20 k/uL    Absolute Immature Granulocyte 0.13 0.00 - 0.30 k/uL    Morphology HYPOCHROMIA PRESENT    Basic Metabolic Panel    Collection Time: 05/25/21  1:05 PM   Result Value Ref Range    Glucose 193 (H) 70 - 99 mg/dL    BUN 28 (H) 8 - 23 mg/dL    CREATININE 1.12 (H) 0.50 - 0.90 mg/dL    Bun/Cre Ratio 25 (H) 9 - 20    Calcium 8.3 (L) 8.6 - 10.4 mg/dL    Sodium 135 135 - 144 mmol/L    Potassium 4.3 3.7 - 5.3 mmol/L    Chloride 105 98 - 107 mmol/L    CO2 20 20 - 31 mmol/L    Anion Gap 10 9 - 17 mmol/L    GFR Non-African American 48 (L) >60 mL/min    GFR  58 (L) >60 mL/min    GFR Comment          GFR Staging NOT REPORTED    TYPE AND SCREEN    Collection Time: 05/25/21  1:05 PM   Result Value Ref Range    Expiration Date 05/28/2021,1839     Arm Band Number [id number]     ABO/Rh A POSITIVE     Antibody Screen NEGATIVE     Unit Number [id number]     Product Code Leukocyte Reduced Red Cell     Unit Divison 00     Dispense Status ALLOCATED     Transfusion Status OK TO TRANSFUSE     Crossmatch Result COMPATIBLE        Imaging/Diagnostics:  CT ABDOMEN PELVIS W IV CONTRAST Additional Contrast? None    Result Date: 5/25/2021  Acute nonspecific pancolitis. Infectious or inflammatory colitis can be considered, less likely ischemic since the major visceral arteries appear patent.        Assessment :      Hospital Problems         Last Modified POA    * (Principal) GI (gastrointestinal bleed) 5/25/2021 Yes    Chronic obstructive pulmonary disease with acute exacerbation ([address] 5/25/2021 Yes    ASHLEIGH (acute kidney injury) ([facility] [address] 5/25/2021 Yes    Type 2 diabetes mellitus without complication, without long-term current use of insulin ([facility] [address] 5/25/2021 Yes    Bipolar 1 disorder ([facility] [address] 5/25/2021 Yes    BRBPR (bright red blood per rectum) 5/25/2021 Yes    Acute colitis 5/25/2021 Yes    Anemia due to blood loss, acute 5/25/2021 Yes    CHF (congestive heart failure) ([facility] [address] 5/25/2021 Yes          Plan:     Patient status inpatient in the  Progressive Unit/Step down    Acute blood loss anemia secondary to GI bleed with bright red blood per rectum and acute colitis  Redraw stat H&H then continue every 6 hours  Type cross and transfuse 1 unit PRBC now and additional units may be ordered if hemoglobin remains less than 7  Agree with Kcentra as initiated by the emergency department and hold any additional Eliquis  GI consultation  Check iron and TIBC  Continue IV fluids and monitor for fluid volume overload as patient does have a history of CHF with fluid retention requiring daily Lasix  Type 2 diabetes  Scheduled long-acting insulin and sliding scale insulin for mealtime corrections  Januvia  Trulicity weekly if remaining in hospital at neck scheduled dose  COPD/asthma  Symbicort twice daily  DuoNeb as needed wheezing  Education on the need for diet lifestyle modifications  Bipolar  Wellbutrin, Cymbalta, Atarax, Nortriptyline  CHF  P.o. Lasix as ordered  Monitor for signs of fluid volume overload secondary to the need for blood transfusions  Repeat chest x-ray in the a.m. If respiratory distress develops increase Lasix      Consultations:   IP CONSULT TO INTERNAL MEDICINE  IP CONSULT TO GI    Patient is admitted as inpatient status because of co-morbidities listed above, severity of signs and symptoms as outlined, requirement for current medical therapies and most importantly because of direct risk to patient if care not provided in a hospital setting. Expected length of stay > 48 hours.     LONDON Navarrete NP  5/25/2021  6:14 PM    Copy sent to Dr. Christine Campos DO

## 2021-05-25 NOTE — PROGRESS NOTES
KCentra Dose Rounding    KCentra dose is based on Factor IX units. KCentra 500 unit vials do not contain exactly 500 units of Factor IX, but for emergent dose calculations, the pharmacist proceeds as if the vials contain 500 or 1000 units. The purpose of this rounding is to shorten the calculation time, expediting delivery to the patient. The dose rounding should not be clinically significant to the patient. The actual units will be recorded in the Pharmacy log and in this progress note. The recorded dose for administration on 5/25/2021 MAR =  4000 units. The actual units administered = 4422 units. Additionally, because of the significant residual in IV tubing, a saline flush is ordered after administration of KCentra. Protocol orders are as follows: When infusion complete flush IV line with 50ml bag of 0.9% Sodium Chloride, infuse at 500 mL/hr. Thank you.   Katarina Miguel, [address] [place] [address]  5/25/2021 2:24 PM

## 2021-05-25 NOTE — ED PROVIDER NOTES
EMERGENCY DEPARTMENT ENCOUNTER   ATTENDING ATTESTATION     Pt Name: Lenise Lennox  MRN: [id number]  [place] 1945  Date of evaluation: 5/25/21   Lenise Lennox is a 77 y.o. female with CC: GI Bleeding    MDM:     BRBPR, on eloquis, Hgb 6, will admit,       Will start Protonix, give Kcentra, admit to stepdown or ICU, transfuse. Also need GI    CRITICAL CARE:       EKG: All EKG's are interpreted by the Emergency Department Physician who either signs or Co-signs this chart in the absence of a cardiologist.      RADIOLOGY:All plain film, CT, MRI, and formal ultrasound images (except ED bedside ultrasound) are read by the radiologist, see reports below, unless otherwise noted in MDM or here. CT ABDOMEN PELVIS W IV CONTRAST Additional Contrast? None    (Results Pending)     LABS: All lab results were reviewed by myself, and all abnormals are listed below. Labs Reviewed   OCCULT BLOOD SCREEN - Abnormal; Notable for the following components:       Result Value    Occult Blood, Stool #1 POSITIVE (*)     All other components within normal limits   CBC WITH AUTO DIFFERENTIAL - Abnormal; Notable for the following components:    WBC 12.7 (*)     RBC 2.81 (*)     Hemoglobin 6.2 (*)     Hematocrit 23.1 (*)     MCV 82.2 (*)     MCH 22.1 (*)     MCHC 26.8 (*)     RDW 16.7 (*)     Seg Neutrophils 79 (*)     Lymphocytes 12 (*)     Immature Granulocytes 1 (*)     Segs Absolute 10.03 (*)     All other components within normal limits   BASIC METABOLIC PANEL - Abnormal; Notable for the following components:    Glucose 193 (*)     BUN 28 (*)     CREATININE 1.12 (*)     Bun/Cre Ratio 25 (*)     Calcium 8.3 (*)     GFR Non- 48 (*)     GFR  58 (*)     All other components within normal limits   TYPE AND SCREEN     CONSULTS:  None  FINAL IMPRESSION    No diagnosis found.         PASTMEDICAL HISTORY     Past Medical History:   Diagnosis Date    ASHLEIGH (acute kidney injury) ([facility] [address] 4/16/2016    Arthritis     Asthma     B[facility] ([facility] [address]     CHF (congestive heart failure) (HCC)     COPD (chronic obstructive pulmonary disease) ([facility] [address]     3 L home O2    Depression     Diabetes mellitus (HCC)     Fibromyalgia     GERD (gastroesophageal reflux disease)     Hyperlipidemia     Hypertension     Pneumonia      SURGICAL HISTORY       Past Surgical History:   Procedure Laterality Date    CHOLECYSTECTOMY      COLONOSCOPY  6/26/2017    COLONOSCOPY POLYPECTOMY REMOVAL HOT BIOPSY/STOMA performed by Jimmie Brittle, DO at [facility] Endoscopy    FOOT FRACTURE SURGERY Left     HYSTERECTOMY      KNEE ARTHROPLASTY Left     [place] COLSC FLX W/RMVL OF TUMOR POLYP LESION SNARE TQ  6/26/2017    COLONOSCOPY POLYPECTOMY SNARE/COLD BIOPSY performed by Jimmie Brittle, DO at [facility] Endoscopy    [place] EGD TRANSORAL BIOPSY SINGLE/MULTIPLE N/A 2/14/2017    EGD BIOPSY performed by Rohini Hatch MD at [facility] Endoscopy    TONSILLECTOMY       CURRENT MEDICATIONS       Previous Medications    ALBUTEROL (PROVENTIL) (2.5 MG/3ML) 0.083% NEBULIZER SOLUTION    Take 2.5 mg by nebulization every 6 hours as needed for Wheezing. ATORVASTATIN (LIPITOR) 40 MG TABLET    Take 40 mg by mouth daily. BUDESONIDE-FORMOTEROL (SYMBICORT) 160-4.5 MCG/ACT AERO    Inhale 1 puff into the lungs 2 times daily. BUSPIRONE (BUSPAR) 10 MG TABLET    Take 10 mg by mouth 2 times daily    CETIRIZINE (ZYRTEC) 10 MG TABLET    Take 10 mg by mouth daily    CHOLECALCIFEROL (VITAMIN D) 2000 UNITS CAPS CAPSULE    Take 2,000 Units by mouth Daily with supper    DOCUSATE (COLACE, DULCOLAX) 100 MG CAPS    Take 100 mg by mouth nightly    DULOXETINE (CYMBALTA) 60 MG CAPSULE    Take 60 mg by mouth 2 times daily.     FERROUS SULFATE 325 (65 FE) MG TABLET    Take 325 mg by mouth Daily with supper    FLUTICASONE (FLONASE) 50 MCG/ACT NASAL SPRAY    1 spray by Nasal route daily    FUROSEMIDE (LASIX) 20 MG TABLET    Take 1 tablet by mouth daily    GLUCOSE (GLUTOSE) 40 % GEL Take 15 g by mouth as needed. GUAIFENESIN (MUCINEX) 600 MG SR TABLET    Take 1 tablet by mouth 2 times daily. HYDROXYZINE (ATARAX) 25 MG TABLET    Take 25 mg by mouth 2 times daily. LOPERAMIDE (LOPERAMIDE A-D) 2 MG TABLET    Take 2 mg by mouth as needed for Diarrhea (takes in the evening)    MAGNESIUM OXIDE (MAG-OX) 400 MG TABLET    Take 400 mg by mouth 2 times daily Indications: do not take at same time as ferrous sulfate    METOPROLOL (TOPROL-XL) 25 MG XL TABLET    Take 25 mg by mouth daily Indications: 1/2 tab  12.5 mg     MIRTAZAPINE (REMERON) 15 MG TABLET    Take 15 mg by mouth nightly. MULTIPLE VITAMINS-MINERALS (RA CENTRAL-MINH SENIOR) TABS    Take 1 tablet by mouth daily    OMEPRAZOLE (PRILOSEC) 20 MG DELAYED RELEASE CAPSULE    Take 1 capsule by mouth daily    PREGABALIN (LYRICA) 100 MG CAPSULE    Take 100 mg by mouth 3 times daily    SITAGLIPTIN PHOSPHATE (JANUVIA PO)    Take 100 mg by mouth daily     TIOTROPIUM (SPIRIVA) 18 MCG INHALATION CAPSULE    Inhale 18 mcg into the lungs daily. TIZANIDINE (ZANAFLEX) 2 MG TABLET    Take 2 mg by mouth 2 times daily     TRAZODONE (DESYREL) 50 MG TABLET    Take 50 mg by mouth nightly     ALLERGIES     is allergic to aspirin, ibuprofen, sulfa antibiotics, and tape [adhesive tape]. FAMILY HISTORY     has no family status information on file. SOCIAL HISTORY       Social History     Tobacco Use    Smoking status: Heavy Tobacco Smoker     Packs/day: 1.00     Years: 40.00     Pack years: 40.00    Smokeless tobacco: Never Used   Substance Use Topics    Alcohol use: No    Drug use: No       I personally evaluated and examined the patient in conjunction with the APC and agree with the assessment, treatment plan, and disposition of the patient as recorded by the APC.    Nicole Miller MD  Attending Emergency Physician         Nicole Miller MD  05/25/21 [id number]

## 2021-05-25 NOTE — CONSULTS
INITIAL CONSULTATION     HISTORY OF PRESENT ILLNESS: Krystina Miller is a 77 y.o. female admitted to the hospital on 5/25/2021 for rectal bleeding and diarrhea. She has not been feeling well for the past week. She reports 7-10 bowel movements per day. Loose to watery. Bright red blood. She has been on Eliquis for atrial fibrillation. She was diagnosed with IBD in 2017. Very likely ulcerative colitis. She was recommended biologic treatment. She did not follow-up. She continues to smoke. She denies taking any NSAIDs. No family history of IBD or GI pathology.      PAST MEDICAL HISTORY:     Past Medical History:   Diagnosis Date    ASHLEIGH (acute kidney injury) ([facility] [address] 4/16/2016    Arthritis     Asthma     Bipolar 1 disorder ([facility] [facility]     [facility]     COPD (chronic obstructive pulmonary disease) (HCC)     3 L home O2    Depression     Diabetes mellitus (HCC)     Fibromyalgia     GERD (gastroesophageal reflux disease)     Hyperlipidemia     Hypertension     Pneumonia         Past Surgical History:   Procedure Laterality Date    CHOLECYSTECTOMY      COLONOSCOPY  6/26/2017    COLONOSCOPY POLYPECTOMY REMOVAL HOT BIOPSY/STOMA performed by Ivan Shepard DO at [facility]    FOOT FRACTURE SURGERY Left     HYSTERECTOMY      KNEE ARTHROPLASTY Left     [facility] COLSC FLX W/RMVL OF TUMOR POLYP LESION SNARE TQ  6/26/2017    COLONOSCOPY POLYPECTOMY SNARE/COLD BIOPSY performed by Ivan Shepard DO at [facility]    [facility] EGD TRANSORAL BIOPSY SINGLE/MULTIPLE N/A 2/14/2017    EGD BIOPSY performed by Ravindra Kebede MD at [facility]    TONSILLECTOMY            CURRENT MEDICATIONS:       Current Facility-Administered Medications:     sodium chloride flush 0.9 % injection 10 mL, 10 mL, Intravenous, PRN, Kalpana Sanchez APRN - CNP, 10 mL at 05/25/21 1415    albuterol (PROVENTIL) nebulizer solution 2.5 mg, 2.5 mg, Nebulization, Q6H PRN, LONDON Elias - NP    atorvastatin (LIPITOR) tablet 40 mg, 40 mg, Oral, Daily, Brooklyn Keenan, APRN - NP    budesonide-formoterol (SYMBICORT) 160-4.5 MCG/ACT inhaler 1 puff, 1 puff, Inhalation, BID, Brooklyn Keenan, APRN - NP    vitamin D capsule CAPS 2,000 Units, 2,000 Units, Oral, Dinner, Brooklyn Keenan, APRN - NP    ferrous sulfate (IRON 325) tablet 325 mg, 325 mg, Oral, Dinner, Brooklyn Keenan, APRN - NP    fluticasone (FLONASE) 50 MCG/ACT nasal spray 1 spray, 1 spray, Nasal, Daily, Brooklyn Keenan, APRN - NP    furosemide (LASIX) tablet 20 mg, 20 mg, Oral, Daily, Brooklyn Keenan, APRN - NP    docusate sodium (COLACE) capsule 100 mg, 100 mg, Oral, Nightly, Brooklyn Keenan, APRN - NP    glucose (GLUTOSE) 40 % oral gel 15 g, 15 g, Oral, PRN, Brooklyn Keenan, APRN - NP    hydrOXYzine (ATARAX) tablet 25 mg, 25 mg, Oral, BID, Brooklyn Keenan, APRN - NP    loperamide (IMODIUM) capsule 2 mg, 2 mg, Oral, PRN, Brooklyn Keenan, APRN - NP    magnesium oxide (MAG-OX) tablet 400 mg, 400 mg, Oral, BID, Brooklyn Keenan, APRN - NP    mirtazapine (REMERON) tablet 15 mg, 15 mg, Oral, Nightly, Brooklyn Keenan, APRN - NP  Comfort Sanches ON 5/26/2021] pantoprazole (PROTONIX) tablet 40 mg, 40 mg, Oral, QAM AC, Brooklyn Keenan, APRN - NP    SITagliptin (JANUVIA) tablet 100 mg, 100 mg, Oral, Daily, Brooklyn Keenan, APRN - NP    tiotropium (SPIRIVA RESPIMAT) 2.5 MCG/ACT inhaler 2 puff, 2 puff, Inhalation, Daily, Brooklyn Keenan, APRN - NP    tiZANidine (ZANAFLEX) tablet 2 mg, 2 mg, Oral, BID, Brooklyn Keenan, APRN - NP    0.9 % sodium chloride infusion, , Intravenous, Continuous, Brooklyn Keenan, APRN - NP    sodium chloride flush 0.9 % injection 5-40 mL, 5-40 mL, Intravenous, 2 times per day, Brooklyn Keenan, APRN - NP    sodium chloride flush 0.9 % injection 5-40 mL, 5-40 mL, Intravenous, PRN, Brooklyn Keenan, APRN - NP    0.9 % sodium chloride infusion, 25 mL, Intravenous, PRN, Brooklyn Keenan, APRN - NP    promethazine (PHENERGAN) tablet 12.5 mg, 12.5 mg, Oral, Q6H PRN **OR** ondansetron (ZOFRAN) injection 4 mg, 4 mg, Intravenous, Q6H PRN, Bob Herring APRN - NP    acetaminophen (TYLENOL) tablet 650 mg, 650 mg, Oral, Q6H PRN **OR** acetaminophen (TYLENOL) suppository 650 mg, 650 mg, Rectal, Q6H PRN, Bob Herring, APRN - NP    albuterol sulfate  (90 Base) MCG/ACT inhaler 2 puff, 2 puff, Inhalation, Q6H PRN, Bob Herring, APRN - NP  Iesha Coulter ON 5/26/2021] buPROPion (WELLBUTRIN XL) extended release tablet 150 mg, 150 mg, Oral, QAM, Bob Herring APRN - NP    DULoxetine (CYMBALTA) extended release capsule 90 mg, 90 mg, Oral, Daily, Bob Herring, APRN - NP  Iesha Evangelista  insulin glargine (LANTUS) injection vial 10 Units, 10 Units, Subcutaneous, Nightly, Bob Herring, APRN - NP    LORazepam (ATIVAN) tablet 0.25 mg, 0.25 mg, Oral, BID PRN, Bob Herring APRN - NP    nortriptyline (PAMELOR) capsule 10 mg, 10 mg, Oral, Nightly, Bob Herring, APRN - NP    tiotropium (SPIRIVA RESPIMAT) 2.5 MCG/ACT inhaler 2 puff, 2 puff, Inhalation, Daily, Bob Herring APRN - NP    cetirizine (ZYRTEC) tablet 10 mg, 10 mg, Oral, Daily, Bob Herring APRN - NP    0.9 % sodium chloride infusion, , Intravenous, PRN, Bob Herring, APRN - NP    polyethylene glycol (GoLYTELY) solution 4,000 mL, 4,000 mL, Oral, Once, LONDON Carrillo - CNP    Current Outpatient Medications:     DULoxetine (CYMBALTA) 30 MG extended release capsule, Take 90 mg by mouth daily, Disp: , Rfl:     furosemide (LASIX) 40 MG tablet, Take 40 mg by mouth daily, Disp: , Rfl:     pantoprazole (PROTONIX) 40 MG tablet, Take 40 mg by mouth 2 times daily, Disp: , Rfl:     levocetirizine (XYZAL) 5 MG tablet, Take 2.5 mg by mouth nightly, Disp: , Rfl:     albuterol sulfate HFA (VENTOLIN HFA) 108 (90 Base) MCG/ACT inhaler, Inhale 2 puffs into the lungs every 6 hours as needed for Wheezing, Disp: , Rfl:     apixaban (ELIQUIS) 5 MG TABS tablet, Take 5 mg by mouth 2 times daily, Disp: , Rfl:     Insulin Detemir mirtazapine (REMERON) 15 MG tablet, Take 15 mg by mouth nightly., Disp: , Rfl:     albuterol (PROVENTIL) (2.5 MG/3ML) 0.083% nebulizer solution, Take 2.5 mg by nebulization every 4 hours as needed for Wheezing , Disp: , Rfl:     hydrOXYzine (ATARAX) 25 MG tablet, Take 25 mg by mouth 2 times daily. , Disp: , Rfl:     atorvastatin (LIPITOR) 40 MG tablet, Take 40 mg by mouth daily. , Disp: , Rfl:     metoprolol (TOPROL-XL) 25 MG XL tablet, Take 12.5 mg by mouth daily Indications: 1/2 tab  12.5 mg , Disp: , Rfl:     loperamide (LOPERAMIDE A-D) 2 MG tablet, Take 2 mg by mouth as needed for Diarrhea (takes in the evening), Disp: , Rfl:     tiZANidine (ZANAFLEX) 2 MG tablet, Take 2 mg by mouth 2 times daily , Disp: , Rfl:     budesonide-formoterol (SYMBICORT) 160-4.5 MCG/ACT AERO, Inhale 1 puff into the lungs 2 times daily. , Disp: , Rfl:        ALLERGIES:   Allergies   Allergen Reactions    Aspirin     Ibuprofen     Sulfa Antibiotics     Tape Stefan Sers Tape]     Lyrica [Pregabalin] Other (See Comments)     \"made her goofy\"          FAMILY HISTORY: The patient's family history was reviewed. SOCIAL HISTORY:   Social History     Socioeconomic History    Marital status:       Spouse name: Not on file    Number of children: Not on file    Years of education: Not on file    Highest education level: Not on file   Occupational History    Not on file   Tobacco Use    Smoking status: Heavy Tobacco Smoker     Packs/day: 1.00     Years: 40.00     Pack years: 40.00    Smokeless tobacco: Never Used   Substance and Sexual Activity    Alcohol use: No    Drug use: No    Sexual activity: Not on file   Other Topics Concern    Not on file   Social History Narrative    Not on file     Social Determinants of Health     Financial Resource Strain:     Difficulty of Paying Living Expenses:    Food Insecurity:     Worried About Running Out of Food in the Last Year:     [address] in the Last Year: Transportation Needs:     Lack of Transportation (Medical):  Lack of Transportation (Non-Medical):    Physical Activity:     Days of Exercise per Week:     Minutes of Exercise per Session:    Stress:     Feeling of Stress :    Social Connections:     Frequency of Communication with Friends and Family:     Frequency of Social Gatherings with Friends and Family:     Attends Islam Services:     Active Member of Clubs or Organizations:     Attends Club or Organization Meetings:     Marital Status:    Intimate Partner Violence:     Fear of Current or Ex-Partner:     Emotionally Abused:     Physically Abused:     Sexually Abused:          REVIEW OF SYSTEMS: A 14-point review of systems was obtained and pertinent positives andnegatives were enumerated above in the history of present illness. All other reviewed systems / symptoms were negative. Review of Systems      PHYSICAL EXAMINATION: Vital signs reviewed per the nursing documentation. BP (!) 102/48   Pulse 92   Temp 97.7 °F (36.5 °C) (Oral)   Resp 16   Wt 172 lb (78 kg)   SpO2 97%   BMI 38.69 kg/m²    [unfilled]   Body mass index is 38.69 kg/m². General:  A O x 3 in NAD   Psych: . Normal affect. Mentation normal   HEENT: PERRLA. Clear conjunctivae and sclerae. Moist oral mucosae, no lesions orulcers. The neck is supple, without lymphadenopathy or jugular venous distension. No masses. Normal thyroid. Cardiovascular: S1 S2 RRR no rubs or murmurs. Pulmonary: clear BL. No accessory muscle usage. Abd Exam: Soft, NT ND, no hepato or spleno megaly, +BS, no ascites. No groin masses or lymphadenopathy. Extremities: No edema. Skin: Warm skin. No skin rash. No spider nevi palmar erythema naildystrophy. Joint: No joint swelling or deformity. Neurological: intact sensory. DTR+.  No asterixis     LABORATORY DATA: Reviewed   Lab Results   Component Value Date    WBC 12.7 (H) 05/25/2021    HGB 6.1 (LL) 05/25/2021    HCT 23.4 (L) 05/25/2021    MCV 82.2 (L) 05/25/2021     05/25/2021     05/25/2021    K 4.3 05/25/2021     05/25/2021    CO2 20 05/25/2021    BUN 28 (H) 05/25/2021    CREATININE 1.12 (H) 05/25/2021    LABALBU 3.1 (L) 06/22/2017    BILITOT 0.21 (L) 06/22/2017    ALKPHOS 117 (H) 06/22/2017    AST 17 06/22/2017    ALT 9 06/22/2017    INR 1.3 05/25/2021      Lab Results   Component Value Date    RBC 2.81 (L) 05/25/2021    HGB 6.1 (LL) 05/25/2021    MCV 82.2 (L) 05/25/2021    MCH 22.1 (L) 05/25/2021    MCHC 26.8 (L) 05/25/2021    RDW 16.7 (H) 05/25/2021    MPV 10.0 05/25/2021    BASOPCT 0 05/25/2021    LYMPHSABS 1.52 05/25/2021    MONOSABS 0.89 05/25/2021    NEUTROABS 10.03 (H) 05/25/2021    EOSABS 0.13 05/25/2021    BASOSABS 0.00 05/25/2021          DIAGNOSTIC TESTING:   CT ABDOMEN PELVIS W IV CONTRAST Additional Contrast? None    Result Date: 5/25/2021  EXAMINATION: CT OF THE ABDOMEN AND PELVIS WITH CONTRAST 5/25/2021 2:14 pm TECHNIQUE: CT of the abdomen and pelvis was performed with the administration of intravenous contrast. Multiplanar reformatted images are provided for review. Dose modulation, iterative reconstruction, and/or weight based adjustment of the mA/kV was utilized to reduce the radiation dose to as low as reasonably achievable. COMPARISON: June 22, 2017 HISTORY: ORDERING SYSTEM PROVIDED HISTORY: bloody diarrhea TECHNOLOGIST PROVIDED HISTORY: bloody diarrhea Decision Support Exception - unselect if not a suspected or confirmed emergency medical condition->Emergency Medical Condition (MA) Reason for Exam: Bloody diarrhea Acuity: Acute Type of Exam: Initial Relevant Medical/Surgical History: Hx diabetic, HTN FINDINGS: Lower Chest: Subsegmental atelectasis in the right middle lobe and lingula. There is global cardiomegaly. Tiny hiatus hernia is present. Organs: No focal liver lesion. The gallbladder is surgically absent. No significant biliary ductal dilatation.   The spleen is normal in size without focal lesion. The pancreas and the adrenal glands are unremarkable. The kidneys enhance symmetrically without collecting system dilatation. There are bilateral renal cysts. Additional bilateral subcentimeter renal lesions are too small to characterize. GI/Bowel: There is mild circumferential wall thickening of the large bowel consistent with acute pancolitis. No pneumatosis or portal venous gas. No bowel obstruction. The appendix is normal. Pelvis: Remote hysterectomy. The urinary bladder is unremarkable. Peritoneum/Retroperitoneum: No abdominal lymphadenopathy or ascites. Bones/Soft Tissues: Prior lower lumbar decompression-stabilization surgery redemonstrated. Severe thoracolumbar spondylosis essentially stable. Chronic bilateral anterior lower rib fracture deformities. Acute nonspecific pancolitis. Infectious or inflammatory colitis can be considered, less likely ischemic since the major visceral arteries appear patent. IMPRESSION: Ms. Jony Cordova is a 77 y.o. female with rectal bleeding. Colitis by CT scan. History of IBD/very likely ulcerative colitis. Monitor H&H closely. Stool studies to exclude infectious pathology. If stool studies are negative we will start her on steroids. Quit smoking. Okay for clear liquid diet now. Thank you for allowing me to participate in the care of Ms. Jony Cordova. For any further questions please do not hesitate to contact me.        MD Noelle Mayes

## 2021-05-26 ENCOUNTER — ANESTHESIA EVENT (OUTPATIENT)
Dept: OPERATING ROOM | Age: 76
DRG: 385 | End: 2021-05-26
Payer: COMMERCIAL

## 2021-05-26 ENCOUNTER — ANESTHESIA (OUTPATIENT)
Dept: OPERATING ROOM | Age: 76
DRG: 385 | End: 2021-05-26
Payer: COMMERCIAL

## 2021-05-26 ENCOUNTER — APPOINTMENT (OUTPATIENT)
Dept: GENERAL RADIOLOGY | Age: 76
DRG: 385 | End: 2021-05-26
Payer: COMMERCIAL

## 2021-05-26 VITALS
RESPIRATION RATE: 12 BRPM | SYSTOLIC BLOOD PRESSURE: 112 MMHG | DIASTOLIC BLOOD PRESSURE: 55 MMHG | OXYGEN SATURATION: 100 %

## 2021-05-26 LAB
ANION GAP SERPL CALCULATED.3IONS-SCNC: 10 MMOL/L (ref 9–17)
BUN BLDV-MCNC: 24 MG/DL (ref 8–23)
BUN/CREAT BLD: 19 (ref 9–20)
CALCIUM SERPL-MCNC: 8.1 MG/DL (ref 8.6–10.4)
CHLORIDE BLD-SCNC: 107 MMOL/L (ref 98–107)
CO2: 22 MMOL/L (ref 20–31)
CREAT SERPL-MCNC: 1.28 MG/DL (ref 0.5–0.9)
GFR AFRICAN AMERICAN: 49 ML/MIN
GFR NON-AFRICAN AMERICAN: 41 ML/MIN
GFR SERPL CREATININE-BSD FRML MDRD: ABNORMAL ML/MIN/{1.73_M2}
GFR SERPL CREATININE-BSD FRML MDRD: ABNORMAL ML/MIN/{1.73_M2}
GLUCOSE BLD-MCNC: 139 MG/DL (ref 65–105)
GLUCOSE BLD-MCNC: 147 MG/DL (ref 65–105)
GLUCOSE BLD-MCNC: 170 MG/DL (ref 65–105)
GLUCOSE BLD-MCNC: 172 MG/DL (ref 70–99)
GLUCOSE BLD-MCNC: 175 MG/DL (ref 65–105)
HCT VFR BLD CALC: 22.9 % (ref 36.3–47.1)
HCT VFR BLD CALC: 23.3 % (ref 36.3–47.1)
HCT VFR BLD CALC: 24.5 % (ref 36.3–47.1)
HCT VFR BLD CALC: 26.3 % (ref 36.3–47.1)
HCT VFR BLD CALC: 27.5 % (ref 36.3–47.1)
HEMOGLOBIN: 6.8 G/DL (ref 11.9–15.1)
HEMOGLOBIN: 6.9 G/DL (ref 11.9–15.1)
HEMOGLOBIN: 6.9 G/DL (ref 11.9–15.1)
HEMOGLOBIN: 7.8 G/DL (ref 11.9–15.1)
HEMOGLOBIN: 8 G/DL (ref 11.9–15.1)
IRON SATURATION: 26 % (ref 20–55)
IRON: 87 UG/DL (ref 37–145)
MCH RBC QN AUTO: 24.8 PG (ref 25.2–33.5)
MCHC RBC AUTO-ENTMCNC: 29.1 G/DL (ref 28.4–34.8)
MCV RBC AUTO: 85.1 FL (ref 82.6–102.9)
NRBC AUTOMATED: 0 PER 100 WBC
PDW BLD-RTO: 16.2 % (ref 11.8–14.4)
PLATELET # BLD: 341 K/UL (ref 138–453)
PMV BLD AUTO: 9.9 FL (ref 8.1–13.5)
POTASSIUM SERPL-SCNC: 3.7 MMOL/L (ref 3.7–5.3)
RBC # BLD: 3.23 M/UL (ref 3.95–5.11)
SODIUM BLD-SCNC: 139 MMOL/L (ref 135–144)
TOTAL IRON BINDING CAPACITY: 330 UG/DL (ref 250–450)
UNSATURATED IRON BINDING CAPACITY: 243 UG/DL (ref 112–347)
WBC # BLD: 14.1 K/UL (ref 3.5–11.3)

## 2021-05-26 PROCEDURE — 43235 EGD DIAGNOSTIC BRUSH WASH: CPT | Performed by: INTERNAL MEDICINE

## 2021-05-26 PROCEDURE — 3609017100 HC EGD: Performed by: INTERNAL MEDICINE

## 2021-05-26 PROCEDURE — 94640 AIRWAY INHALATION TREATMENT: CPT

## 2021-05-26 PROCEDURE — 0DJ08ZZ INSPECTION OF UPPER INTESTINAL TRACT, VIA NATURAL OR ARTIFICIAL OPENING ENDOSCOPIC: ICD-10-PCS | Performed by: INTERNAL MEDICINE

## 2021-05-26 PROCEDURE — 36430 TRANSFUSION BLD/BLD COMPNT: CPT

## 2021-05-26 PROCEDURE — 1200000000 HC SEMI PRIVATE

## 2021-05-26 PROCEDURE — 83540 ASSAY OF IRON: CPT

## 2021-05-26 PROCEDURE — 6370000000 HC RX 637 (ALT 250 FOR IP): Performed by: NURSE PRACTITIONER

## 2021-05-26 PROCEDURE — 83550 IRON BINDING TEST: CPT

## 2021-05-26 PROCEDURE — 85027 COMPLETE CBC AUTOMATED: CPT

## 2021-05-26 PROCEDURE — 3700000000 HC ANESTHESIA ATTENDED CARE: Performed by: INTERNAL MEDICINE

## 2021-05-26 PROCEDURE — 6360000002 HC RX W HCPCS: Performed by: INTERNAL MEDICINE

## 2021-05-26 PROCEDURE — 2500000003 HC RX 250 WO HCPCS: Performed by: NURSE ANESTHETIST, CERTIFIED REGISTERED

## 2021-05-26 PROCEDURE — 45380 COLONOSCOPY AND BIOPSY: CPT | Performed by: INTERNAL MEDICINE

## 2021-05-26 PROCEDURE — APPNB30 APP NON BILLABLE TIME 0-30 MINS: Performed by: NURSE PRACTITIONER

## 2021-05-26 PROCEDURE — 6370000000 HC RX 637 (ALT 250 FOR IP): Performed by: INTERNAL MEDICINE

## 2021-05-26 PROCEDURE — 6360000002 HC RX W HCPCS: Performed by: NURSE PRACTITIONER

## 2021-05-26 PROCEDURE — 3700000001 HC ADD 15 MINUTES (ANESTHESIA): Performed by: INTERNAL MEDICINE

## 2021-05-26 PROCEDURE — 2580000003 HC RX 258: Performed by: NURSE ANESTHETIST, CERTIFIED REGISTERED

## 2021-05-26 PROCEDURE — 6360000002 HC RX W HCPCS: Performed by: NURSE ANESTHETIST, CERTIFIED REGISTERED

## 2021-05-26 PROCEDURE — 36415 COLL VENOUS BLD VENIPUNCTURE: CPT

## 2021-05-26 PROCEDURE — 82947 ASSAY GLUCOSE BLOOD QUANT: CPT

## 2021-05-26 PROCEDURE — 71045 X-RAY EXAM CHEST 1 VIEW: CPT

## 2021-05-26 PROCEDURE — 2709999900 HC NON-CHARGEABLE SUPPLY: Performed by: INTERNAL MEDICINE

## 2021-05-26 PROCEDURE — 85014 HEMATOCRIT: CPT

## 2021-05-26 PROCEDURE — 3609010300 HC COLONOSCOPY W/BIOPSY SINGLE/MULTIPLE: Performed by: INTERNAL MEDICINE

## 2021-05-26 PROCEDURE — 94761 N-INVAS EAR/PLS OXIMETRY MLT: CPT

## 2021-05-26 PROCEDURE — 0DBE8ZX EXCISION OF LARGE INTESTINE, VIA NATURAL OR ARTIFICIAL OPENING ENDOSCOPIC, DIAGNOSTIC: ICD-10-PCS | Performed by: INTERNAL MEDICINE

## 2021-05-26 PROCEDURE — 88305 TISSUE EXAM BY PATHOLOGIST: CPT

## 2021-05-26 PROCEDURE — P9016 RBC LEUKOCYTES REDUCED: HCPCS

## 2021-05-26 PROCEDURE — 80048 BASIC METABOLIC PNL TOTAL CA: CPT

## 2021-05-26 PROCEDURE — 7100000001 HC PACU RECOVERY - ADDTL 15 MIN: Performed by: INTERNAL MEDICINE

## 2021-05-26 PROCEDURE — 2700000000 HC OXYGEN THERAPY PER DAY

## 2021-05-26 PROCEDURE — 99232 SBSQ HOSP IP/OBS MODERATE 35: CPT | Performed by: INTERNAL MEDICINE

## 2021-05-26 PROCEDURE — APPSS45 APP SPLIT SHARED TIME 31-45 MINUTES: Performed by: NURSE PRACTITIONER

## 2021-05-26 PROCEDURE — 94660 CPAP INITIATION&MGMT: CPT

## 2021-05-26 PROCEDURE — 86900 BLOOD TYPING SEROLOGIC ABO: CPT

## 2021-05-26 PROCEDURE — 7100000000 HC PACU RECOVERY - FIRST 15 MIN: Performed by: INTERNAL MEDICINE

## 2021-05-26 PROCEDURE — 85018 HEMOGLOBIN: CPT

## 2021-05-26 RX ORDER — METHYLPREDNISOLONE SODIUM SUCCINATE 40 MG/ML
40 INJECTION, POWDER, LYOPHILIZED, FOR SOLUTION INTRAMUSCULAR; INTRAVENOUS DAILY
Status: DISCONTINUED | OUTPATIENT
Start: 2021-05-26 | End: 2021-05-29

## 2021-05-26 RX ORDER — LIDOCAINE HYDROCHLORIDE 20 MG/ML
INJECTION, SOLUTION INFILTRATION; PERINEURAL PRN
Status: DISCONTINUED | OUTPATIENT
Start: 2021-05-26 | End: 2021-05-26 | Stop reason: SDUPTHER

## 2021-05-26 RX ORDER — MORPHINE SULFATE 2 MG/ML
2 INJECTION, SOLUTION INTRAMUSCULAR; INTRAVENOUS EVERY 4 HOURS PRN
Status: DISCONTINUED | OUTPATIENT
Start: 2021-05-26 | End: 2021-05-26

## 2021-05-26 RX ORDER — KETAMINE HCL IN NACL, ISO-OSM 100MG/10ML
SYRINGE (ML) INJECTION PRN
Status: DISCONTINUED | OUTPATIENT
Start: 2021-05-26 | End: 2021-05-26 | Stop reason: SDUPTHER

## 2021-05-26 RX ORDER — SODIUM CHLORIDE 9 MG/ML
INJECTION, SOLUTION INTRAVENOUS PRN
Status: DISCONTINUED | OUTPATIENT
Start: 2021-05-26 | End: 2021-05-29 | Stop reason: SDUPTHER

## 2021-05-26 RX ORDER — PROPOFOL 10 MG/ML
INJECTION, EMULSION INTRAVENOUS PRN
Status: DISCONTINUED | OUTPATIENT
Start: 2021-05-26 | End: 2021-05-26 | Stop reason: SDUPTHER

## 2021-05-26 RX ORDER — DIPHENHYDRAMINE HYDROCHLORIDE 50 MG/ML
25 INJECTION INTRAMUSCULAR; INTRAVENOUS EVERY 6 HOURS PRN
Status: DISCONTINUED | OUTPATIENT
Start: 2021-05-26 | End: 2021-05-31

## 2021-05-26 RX ORDER — SODIUM CHLORIDE 9 MG/ML
INJECTION, SOLUTION INTRAVENOUS PRN
Status: DISCONTINUED | OUTPATIENT
Start: 2021-05-26 | End: 2021-05-26 | Stop reason: SDUPTHER

## 2021-05-26 RX ORDER — SODIUM CHLORIDE 9 MG/ML
INJECTION, SOLUTION INTRAVENOUS CONTINUOUS PRN
Status: DISCONTINUED | OUTPATIENT
Start: 2021-05-26 | End: 2021-05-26 | Stop reason: SDUPTHER

## 2021-05-26 RX ORDER — HYDROMORPHONE HYDROCHLORIDE 1 MG/ML
1 INJECTION, SOLUTION INTRAMUSCULAR; INTRAVENOUS; SUBCUTANEOUS
Status: DISCONTINUED | OUTPATIENT
Start: 2021-05-26 | End: 2021-06-03

## 2021-05-26 RX ADMIN — LIDOCAINE HYDROCHLORIDE 50 MG: 20 INJECTION, SOLUTION INFILTRATION; PERINEURAL at 16:08

## 2021-05-26 RX ADMIN — TIZANIDINE 2 MG: 2 TABLET ORAL at 20:00

## 2021-05-26 RX ADMIN — SODIUM CHLORIDE: 9 INJECTION, SOLUTION INTRAVENOUS at 15:57

## 2021-05-26 RX ADMIN — TIOTROPIUM BROMIDE INHALATION SPRAY 2 PUFF: 3.12 SPRAY, METERED RESPIRATORY (INHALATION) at 07:40

## 2021-05-26 RX ADMIN — TIZANIDINE 2 MG: 2 TABLET ORAL at 08:46

## 2021-05-26 RX ADMIN — ATORVASTATIN CALCIUM 40 MG: 40 TABLET, FILM COATED ORAL at 19:59

## 2021-05-26 RX ADMIN — NORTRIPTYLINE HYDROCHLORIDE 10 MG: 10 CAPSULE ORAL at 20:02

## 2021-05-26 RX ADMIN — ALBUTEROL SULFATE 2.5 MG: 2.5 SOLUTION RESPIRATORY (INHALATION) at 10:07

## 2021-05-26 RX ADMIN — PROPOFOL 40 MG: 10 INJECTION, EMULSION INTRAVENOUS at 16:08

## 2021-05-26 RX ADMIN — BUPROPION HYDROCHLORIDE 150 MG: 150 TABLET, EXTENDED RELEASE ORAL at 07:35

## 2021-05-26 RX ADMIN — Medication 1 MG: at 20:00

## 2021-05-26 RX ADMIN — PANTOPRAZOLE SODIUM 40 MG: 40 TABLET, DELAYED RELEASE ORAL at 07:35

## 2021-05-26 RX ADMIN — Medication 10 MG: at 16:15

## 2021-05-26 RX ADMIN — DULOXETINE HYDROCHLORIDE 90 MG: 60 CAPSULE, DELAYED RELEASE ORAL at 07:34

## 2021-05-26 RX ADMIN — ALOGLIPTIN 12.5 MG: 12.5 TABLET, FILM COATED ORAL at 07:35

## 2021-05-26 RX ADMIN — Medication 15 MG: at 16:09

## 2021-05-26 RX ADMIN — Medication 1 MG: at 09:43

## 2021-05-26 RX ADMIN — HYDROXYZINE HYDROCHLORIDE 25 MG: 25 TABLET, FILM COATED ORAL at 19:59

## 2021-05-26 RX ADMIN — Medication 2000 UNITS: at 18:28

## 2021-05-26 RX ADMIN — ALBUTEROL SULFATE 2.5 MG: 2.5 SOLUTION RESPIRATORY (INHALATION) at 21:13

## 2021-05-26 RX ADMIN — CETIRIZINE HYDROCHLORIDE 10 MG: 10 TABLET, FILM COATED ORAL at 07:35

## 2021-05-26 RX ADMIN — DIPHENHYDRAMINE HYDROCHLORIDE 25 MG: 50 INJECTION, SOLUTION INTRAMUSCULAR; INTRAVENOUS at 04:19

## 2021-05-26 RX ADMIN — METHYLPREDNISOLONE SODIUM SUCCINATE 40 MG: 40 INJECTION, POWDER, FOR SOLUTION INTRAMUSCULAR; INTRAVENOUS at 18:29

## 2021-05-26 RX ADMIN — MIRTAZAPINE 15 MG: 15 TABLET, FILM COATED ORAL at 20:02

## 2021-05-26 RX ADMIN — HYDROXYZINE HYDROCHLORIDE 25 MG: 25 TABLET, FILM COATED ORAL at 07:35

## 2021-05-26 RX ADMIN — ALBUTEROL SULFATE 2.5 MG: 2.5 SOLUTION RESPIRATORY (INHALATION) at 13:54

## 2021-05-26 RX ADMIN — BUDESONIDE AND FORMOTEROL FUMARATE DIHYDRATE 1 PUFF: 160; 4.5 AEROSOL RESPIRATORY (INHALATION) at 21:52

## 2021-05-26 RX ADMIN — FERROUS SULFATE TAB EC 325 MG (65 MG FE EQUIVALENT) 325 MG: 325 (65 FE) TABLET DELAYED RESPONSE at 18:29

## 2021-05-26 RX ADMIN — BUDESONIDE AND FORMOTEROL FUMARATE DIHYDRATE 1 PUFF: 160; 4.5 AEROSOL RESPIRATORY (INHALATION) at 07:41

## 2021-05-26 RX ADMIN — MORPHINE SULFATE 2 MG: 2 INJECTION, SOLUTION INTRAMUSCULAR; INTRAVENOUS at 01:30

## 2021-05-26 RX ADMIN — MAGNESIUM GLUCONATE 500 MG ORAL TABLET 400 MG: 500 TABLET ORAL at 19:59

## 2021-05-26 ASSESSMENT — PAIN SCALES - GENERAL
PAINLEVEL_OUTOF10: 10
PAINLEVEL_OUTOF10: 8
PAINLEVEL_OUTOF10: 9
PAINLEVEL_OUTOF10: 9
PAINLEVEL_OUTOF10: 5

## 2021-05-26 ASSESSMENT — PAIN DESCRIPTION - PROGRESSION
CLINICAL_PROGRESSION: NOT CHANGED
CLINICAL_PROGRESSION_2: NOT CHANGED

## 2021-05-26 ASSESSMENT — PULMONARY FUNCTION TESTS
PIF_VALUE: 1

## 2021-05-26 ASSESSMENT — ENCOUNTER SYMPTOMS
DIARRHEA: 1
SINUS PRESSURE: 0
NAUSEA: 1
PHOTOPHOBIA: 0
ABDOMINAL PAIN: 1
SINUS PAIN: 0
SHORTNESS OF BREATH: 1
BLOOD IN STOOL: 1
ANAL BLEEDING: 1
ABDOMINAL DISTENTION: 1
COLOR CHANGE: 0

## 2021-05-26 ASSESSMENT — PAIN DESCRIPTION - ONSET
ONSET_2: PROGRESSIVE
ONSET: GRADUAL

## 2021-05-26 ASSESSMENT — PAIN DESCRIPTION - LOCATION
LOCATION: ABDOMEN
LOCATION_2: SHOULDER

## 2021-05-26 ASSESSMENT — PAIN - FUNCTIONAL ASSESSMENT
PAIN_FUNCTIONAL_ASSESSMENT: 0-10
PAIN_FUNCTIONAL_ASSESSMENT: 0-10
PAIN_FUNCTIONAL_ASSESSMENT: PREVENTS OR INTERFERES SOME ACTIVE ACTIVITIES AND ADLS
PAIN_FUNCTIONAL_ASSESSMENT: 0-10

## 2021-05-26 ASSESSMENT — PAIN DESCRIPTION - INTENSITY: RATING_2: 4

## 2021-05-26 ASSESSMENT — PAIN DESCRIPTION - ORIENTATION
ORIENTATION: ANTERIOR
ORIENTATION_2: LEFT;UPPER

## 2021-05-26 ASSESSMENT — PAIN DESCRIPTION - PAIN TYPE
TYPE_2: CHRONIC PAIN
TYPE: ACUTE PAIN

## 2021-05-26 ASSESSMENT — LIFESTYLE VARIABLES: SMOKING_STATUS: 1

## 2021-05-26 ASSESSMENT — PAIN DESCRIPTION - FREQUENCY: FREQUENCY: CONTINUOUS

## 2021-05-26 ASSESSMENT — PAIN DESCRIPTION - DESCRIPTORS
DESCRIPTORS_2: ACHING
DESCRIPTORS: CRAMPING

## 2021-05-26 NOTE — CARE COORDINATION
Case Management Initial Discharge  [facility],         Readmission Risk              Risk of Unplanned Readmission:  24             Met with:patient to discuss discharge plans. Information verified: address, contacts, phone number, , insurance Yes  PCP: Eugene Silvestre DO  Date of last visit: monthly sees NP thru Dr. Yadav Peoples office they come to her house     Insurance Provider: Hussain Espinal    Discharge Planning  Current Residence:  [facility] lives on 3rfd floor uses elevator   Living Arrangements:    alone with kids help   Home has 0 stories/0 stairs to climb  Support Systems:     Current Services PTA:  VNS/HHA Agency: [facility]   Patient able to perform ADL's:Assisted  DME in home:  Walker, cane, WC, Scooter, Trilogy and  3l 24 ([facility])  DME used to aid ambulation prior to admission:   Walker prn   DME used during admission:  TBD     Potential Assistance Needed:       Pharmacy: [facility]    Potential Assistance Purchasing Medications:     Does patient want to participate in local refill/ meds to beds program?       Patient agreeable to home care: Yes  Freedom of choice provided:  yes      Type of Home Care Services:     Patient expects to be discharged to:       Prior SNF/Rehab Placement and Facility: [address] [place] [address] just discharged in April after being there for 1 year   Agreeable to SNF/Rehab: No  Adrian of choice provided: n/a   Evaluation: n/a    Expected Discharge date: Follow Up Appointment: Best Day/ Time:      Transportation provider: kids   Transportation arrangements needed for discharge: No    COVID Vaccine: Yes, Pfizer in February at [facility] completed both doses    Discharge Plan: Pt from home alone, Sr. Apt. Current with [facility]. DME-Walker, cane, WC, Scooter, Trilogy and  3l 24 ([facility]).  Plans to return home with vns/HHA Eliquis home med on hold    Pt was at [facility] for nearly a year for complicated ortho surgery right andra. Discharged from there in April. Pt does have a visiting NP sees monthly, has [facility] for SN, and [facility] for HHA she gets 6 hrs/day and has independent weekend aide coverage. Notified [facility] of admit-verified she is current getting SN and PT     LM with [facility] to confirm services       Rec 2 units of PRBCS, HGB 6.1 on admit   GI consult  EGD/Colon today     The Plan for Transition of Care is related to the following treatment goals: SN PT HHA     The Patient and/or patient representative  was provided with a choice of provider and agrees   with the discharge plan. [x] Yes [] No    Freedom of choice list was provided with basic dialogue that supports the patient's individualized plan of care/goals, treatment preferences and shares the quality data associated with the providers.  [x] Yes [] No      Electronically signed by Rolo Jon RN on 5/26/21 at 11:15 AM EDT

## 2021-05-26 NOTE — PROGRESS NOTES
Message sent to [facility] regarding patient complaints of itching. New orders received and implemented.

## 2021-05-26 NOTE — OP NOTE
[facility]     PROCEDURE DATE: 05/26/21    REFERRING PHYSICIAN: No ref. provider found     PRIMARY CARE PROVIDER: Karma Vogel DO    ATTENDING PHYSICIAN: Gilles Boast, MD     HISTORY: Ms. Ct Beyer is a 77 y.o. female who presents to the [place] [address] Endoscopy unit for upper endoscopy. The patient's clinical history is remarkable for rectal bleeding. No blood thinners. She is currently medically stable and appropriate for the planned procedure. PREOPERATIVE DIAGNOSIS: Rectal bleeding. PROCEDURES:   1) Transoral Upper Endoscopy. POSTOPERATIVE DIAGNOSIS:   Mild gastritis  No blood or bleeding lesions    SPECIMENS: None    MEDICATIONS:   MAC per anesthesia    EBL: minimal    INSTRUMENT: Olympus GIF-H190 flexible Gastroscope. PREPARATION: The nature and character of the procedure as well as risks, benefits, and alternatives were discussed with the patient and informed consent was obtained. Complications were said to include, but were not limited to: medication allergy, medication reaction, cardiovascular and respiratory problems, bleeding, perforation, infection, and/or missed diagnosis. Following arrival in the endoscopy room, the patient was placed in the left lateral decubitus position and final time-out accomplished in the presence of the nursing staff. Baseline vital signs were obtained and reviewed, and IV sedation was subsequently initiated. FINDINGS:   Esophagus: The esophagus was inspected to the Z-line. The endoscopic exam showed no pathology. Stomach: The stomach was inspected in both forward and retroflex fashion and was appropriately distensible. The cardia, fundus, incisura, antrum and pylorus were identified via direct visualization. The endoscopic exam showed mild erythema in distal stomach. No bleeding. Duodenum: The proximal small bowel was inspected through the bulb, sweep, and second portion of the duodenum.  The endoscopic exam showed no pathology. RECOMMENDATIONS:   1) Transfer back to the floor for further management as per primary care team.   2) Proceed with planned colonoscopy.         Tony Flor MD [facility]

## 2021-05-26 NOTE — PROGRESS NOTES
Called to speak with lab, told lab H&H was due to be drawn at 1430, they stated they could not see lab. They said they could move 1600 order to now. Writer explained patient was going up to EGD now and they needed to go to holding to draw.

## 2021-05-26 NOTE — ANESTHESIA PRE PROCEDURE
mg by mouth as needed for Diarrhea (takes in the evening)    Historical Provider, MD   tiZANidine (ZANAFLEX) 2 MG tablet Take 2 mg by mouth 2 times daily     Historical Provider, MD   budesonide-formoterol (SYMBICORT) 160-4.5 MCG/ACT AERO Inhale 1 puff into the lungs 2 times daily.     Historical Provider, MD       Current medications:    Current Facility-Administered Medications   Medication Dose Route Frequency Provider Last Rate Last Admin    diphenhydrAMINE (BENADRYL) injection 25 mg  25 mg Intravenous Q6H PRN LONDON Jarrett CNP   25 mg at 05/26/21 0419    HYDROmorphone HCl PF (DILAUDID) injection 1 mg  1 mg Intravenous Q3H PRN LONDON Anguiano NP   1 mg at 05/26/21 0943    0.9 % sodium chloride infusion   Intravenous PRN LONDON Anguiano NP        albuterol (PROVENTIL) nebulizer solution 2.5 mg  2.5 mg Nebulization Q6H PRN LONDON Anguiano NP   2.5 mg at 05/26/21 1354    atorvastatin (LIPITOR) tablet 40 mg  40 mg Oral Nightly LONDON Anguiano NP        budesonide-formoterol (SYMBICORT) 160-4.5 MCG/ACT inhaler 1 puff  1 puff Inhalation BID LONDON Anguiano NP   1 puff at 05/26/21 0741    Vitamin D (CHOLECALCIFEROL) tablet 2,000 Units  2,000 Units Oral Dinner LONDON Anguiano NP        ferrous sulfate (FE TABS 325) EC tablet 325 mg  325 mg Oral Dinner LONDON Anguiano NP        fluticasone (FLONASE) 50 MCG/ACT nasal spray 1 spray  1 spray Nasal Daily LONDON Anguiano NP        furosemide (LASIX) tablet 20 mg  20 mg Oral Daily LONDON Anguiano NP        docusate sodium (COLACE) capsule 100 mg  100 mg Oral Nightly LONDON Anguiano NP        glucose (GLUTOSE) 40 % oral gel 15 g  15 g Oral PRN LONDON Anguiano NP        hydrOXYzine (ATARAX) tablet 25 mg  25 mg Oral BID LONDON Anguiano NP   25 mg at 05/26/21 0735    loperamide (IMODIUM) capsule 2 mg  2 mg Oral PRN LONDON Anguiano NP        magnesium oxide - NP   0.25 mg at 05/25/21 2257    nortriptyline (PAMELOR) capsule 10 mg  10 mg Oral Nightly Uzielie Aj, APRN - NP   10 mg at 05/25/21 2244    cetirizine (ZYRTEC) tablet 10 mg  10 mg Oral Daily Cookie Aj, APRN - NP   10 mg at 05/26/21 0735    alogliptin (NESINA) tablet 12.5 mg  12.5 mg Oral Daily Uzielie Bremerton, APRN - NP   12.5 mg at 05/26/21 2737       Allergies: Allergies   Allergen Reactions    Aspirin     Ibuprofen     Sulfa Antibiotics     Tape Ezella Aquas Tape]     Lyrica [Pregabalin] Other (See Comments)     \"made her goofy\"       Problem List:    Patient Active Problem List   Diagnosis Code    COPD exacerbation ([facility] J44.1    Chronic obstructive pulmonary disease with acute exacerbation ([facility] J44.1    Septic shock ([facility] A41.9, R65.21    ARDS (adult respiratory distress syndrome) ([facility] J80    Aspiration pneumonia ([address] J69.0    ASHLEIGH (acute kidney injury) ([facility] N17.9    Disorientation R41.0    Encephalopathy acute G93.40    Smoker F17.200    Type 2 diabetes mellitus without complication, without long-term current use of insulin ([facility]) E11.9    Acute recurrent sinusitis J01.91    Acute respiratory failure with hypoxia and hypercapnia ([facility]) J96.01, J96.02    Diarrhea R19.7    SOB (shortness of breath) R06.02    Chest pain on breathing R07.1    Acute bronchitis due to infection J20.8    Chronic respiratory failure with hypoxia ([facility]) J96.11    Chronic obstructive pulmonary disease ([facility]) J44.9    Bipolar 1 disorder ([facility]) F31.9    Depression F32.9    Dizziness R42    Polypharmacy Z79.899    Pre-syncope R55    Fall from other slipping, tripping, or stumbling W01. 0XXA    Lumbar spine tumor D49.2    BRBPR (bright red blood per rectum) K62.5    Acute colitis K52.9    Atrial fibrillation with rapid ventricular response ([facility]) I48.91    Chronic back pain M54.9, G89.29    QT prolongation R94.31    Anemia due to blood loss, acute D62    GI (gastrointestinal bleed) K92.2    Hypertension I10    Hyperlipidemia E78.5    CHF (congestive heart failure) (HCC) I50.9    GERD (gastroesophageal reflux disease) K21.9    Asthma J45.909    Pancolitis (HCC) K51.00       Past Medical History:        Diagnosis Date    ASHLEIGH (acute kidney injury) ([facility] 4/16/2016    Arthritis     Asthma     Bipolar 1 disorder (HCC)     CHF (congestive heart failure) (HCC)     COPD (chronic obstructive pulmonary disease) (HCC)     3 L home O2    Depression     Diabetes mellitus (HCC)     Fibromyalgia     GERD (gastroesophageal reflux disease)     Hyperlipidemia     Hypertension     Pneumonia        Past Surgical History:        Procedure Laterality Date    CHOLECYSTECTOMY      COLONOSCOPY  6/26/2017    COLONOSCOPY POLYPECTOMY REMOVAL HOT BIOPSY/STOMA performed by Kristie Parada DO at [facility] Left     HYSTERECTOMY      KNEE ARTHROPLASTY Left     [place] COLSC FLX W/RMVL OF TUMOR POLYP LESION SNARE TQ  6/26/2017    COLONOSCOPY POLYPECTOMY SNARE/COLD BIOPSY performed by [facility]ie [facility] DO at [facility]    [place] EGD TRANSORAL BIOPSY SINGLE/MULTIPLE N/A 2/14/2017    EGD BIOPSY performed by Letty Crawford MD at [facility]    TONSILLECTOMY         Social History:    Social History     Tobacco Use    Smoking status: Heavy Tobacco Smoker     Packs/day: 1.00     Years: 40.00     Pack years: 40.00    Smokeless tobacco: Never Used   Substance Use Topics    Alcohol use:  No                                Ready to quit: Not Answered  Counseling given: Not Answered      Vital Signs (Current):   Vitals:    05/26/21 1132 05/26/21 1330 05/26/21 1442 05/26/21 1500   BP: (!) 128/53 (!) 143/57 (!) 126/35 (!) 123/37   Pulse: 87 84 79 82   Resp: 18 19 20 19   Temp: 97.8 °F (36.6 °C) 98.3 °F (36.8 °C) 98.6 °F (37 °C) 98.2 °F (36.8 °C)   TempSrc: Oral Oral Oral Oral   SpO2: 99% 99% 98% 99%   Weight:       Height:                                                  BP Readings from Last 3 Encounters:   05/26/21 (!) 123/37   06/27/17 (!) 135/59   06/26/17 (!) 90/39       NPO Status:                                                                                 BMI:   Wt Readings from Last 3 Encounters:   05/26/21 171 lb 15.3 oz (78 kg)   06/22/17 170 lb 3 oz (77.2 kg)   02/12/17 174 lb 13.2 oz (79.3 kg)     Body mass index is 38.68 kg/m².     CBC:   Lab Results   Component Value Date    WBC 14.1 05/26/2021    RBC 3.23 05/26/2021    HGB 6.9 05/26/2021    HCT 23.3 05/26/2021    MCV 85.1 05/26/2021    RDW 16.2 05/26/2021     05/26/2021       CMP:   Lab Results   Component Value Date     05/26/2021    K 3.7 05/26/2021     05/26/2021    CO2 22 05/26/2021    BUN 24 05/26/2021    CREATININE 1.28 05/26/2021    GFRAA 49 05/26/2021    LABGLOM 41 05/26/2021    GLUCOSE 172 05/26/2021    PROT 7.2 06/22/2017    CALCIUM 8.1 05/26/2021    BILITOT 0.21 06/22/2017    ALKPHOS 117 06/22/2017    AST 17 06/22/2017    ALT 9 06/22/2017       POC Tests:   Recent Labs     05/26/21  1128   POCGLU 147*       Coags:   Lab Results   Component Value Date    PROTIME 16.1 05/25/2021    INR 1.3 05/25/2021    APTT 16.8 06/22/2017       HCG (If Applicable): No results found for: PREGTESTUR, PREGSERUM, HCG, HCGQUANT     ABGs: No results found for: PHART, PO2ART, DGS2LHZ, VRC7YPZ, BEART, I8LBAVEA     Type & Screen (If Applicable):  No results found for: LABABO, LABRH    Drug/Infectious Status (If Applicable):  Lab Results   Component Value Date    HEPCAB NONREACTIVE 04/16/2016       COVID-19 Screening (If Applicable):   Lab Results   Component Value Date    COVID19 Not Detected 05/25/2021           Anesthesia Evaluation  Patient summary reviewed and Nursing notes reviewed no history of anesthetic complications:   Airway: Mallampati: II  TM distance: >3 FB   Neck ROM: full  Mouth opening: > = 3 FB Dental:    (+) partials      Pulmonary:   (+) COPD:  sleep apnea: on CPAP,  decreased breath sounds,  asthma: current smoker                          ROS comment: Chronic O2 home    Cardiovascular:  Exercise tolerance: no interval change,   (+) hypertension:, CHF:,     (-) past MI, CAD and CABG/stent    ECG reviewed    Rate: normal  Echocardiogram reviewed                  Neuro/Psych:   (+) psychiatric history:            GI/Hepatic/Renal:   (+) GERD: well controlled, PUD,           Endo/Other:    (+) Diabetes, blood dyscrasia: anemia:., .                 Abdominal:   (+) obese,         Vascular:                                        Anesthesia Plan      MAC     ASA 4       Induction: intravenous. Anesthetic plan and risks discussed with patient. Plan discussed with CRNA.     Attending anesthesiologist reviewed and agrees with Pre Eval content              Ddoie Funk DO   5/26/2021

## 2021-05-26 NOTE — PLAN OF CARE
Problem: Falls - Risk of:  Goal: Will remain free from falls  Description: Will remain free from falls  5/26/2021 1355 by Jace Cortes RN  Outcome: Ongoing  Note: Patient is fall risk per fall scale. Hourly rounding performed. Personal belongings and call light within reach. Bed in low position.    5/26/2021 0334 by Estevan Ricks RN  Outcome: Ongoing  Goal: Absence of physical injury  Description: Absence of physical injury  5/26/2021 1355 by Jace Cortes RN  Outcome: Ongoing  5/26/2021 0334 by Estevan Ricks RN  Outcome: Ongoing     Problem: Skin Integrity:  Goal: Will show no infection signs and symptoms  Description: Will show no infection signs and symptoms  5/26/2021 1355 by Jace Cortes RN  Outcome: Ongoing  Note:   PRN Q2 turn    5/26/2021 0334 by Estevan Ricks RN  Outcome: Ongoing  Goal: Absence of new skin breakdown  Description: Absence of new skin breakdown  5/26/2021 1355 by Jace Cortes RN  Outcome: Ongoing  5/26/2021 0334 by Estevan Ricks RN  Outcome: Ongoing

## 2021-05-26 NOTE — PROGRESS NOTES
Blood consent was obtained. Blood was dual signed per writer and an additional Rn, Xiomara Li touched vein @ 1117  Vitals stable. Vitals were taken 30 min prior to blood administration and 15 min after start of blood. Pt was observed for 15 min. Per writer  No reaction noted.  Will continue to monitor

## 2021-05-26 NOTE — PROGRESS NOTES
The patient completed the blood transfusion. No signs of reaction occurred. The patient tolerated well.

## 2021-05-26 NOTE — PROGRESS NOTES
Physician Progress Note      Sherita Olivera  CSN #:                  [id number]  :                       1945  ADMIT DATE:       2021 12:33 PM  [address] [place] DATE:  RESPONDING  PROVIDER #:        Rashaun CALLAHAN - JOSE          QUERY TEXT:    Patient admitted with GI bleed and is on chronic anticoagulation. If possible,   please document in the progress notes and discharge summary if you are   evaluating and/or treating any of the following: The medical record reflects the following:  Risk Factors: AFIB and chronic Eliquis use , PMH ulcerative colitis  Clinical Indicators: per H&P: Agree with Kcentra as initiated by the emergency   department and hold any additional Eliquis,  occult blood + , PN     Patient continues to experience dark red stools mixed with bright red blood   and liquid diarrhea. CT of the abdomen shows pancolitis  Treatment: Kcentra given in ED , transfusions. H&H q6, IV protonix ; holding   Eliquis  Options provided:  -- GIB associated with Eliquis. -- GIB is unrelated to anticoagulation  -- Other - I will add my own diagnosis  -- Disagree - Not applicable / Not valid  -- Disagree - Clinically unable to determine / Unknown  -- Refer to Clinical Documentation Reviewer    PROVIDER RESPONSE TEXT:    This patient has bleeding associated with Eliquis. Query created by: Bill Morgan on 2021 11:35 AM      QUERY TEXT:    Pt admitted with GIB and has CHF documented. If possible, please document in   progress notes and discharge summary further specificity regarding the type   and acuity of CHF:    The medical record reflects the following:  Risk Factors: DM, advancing age, HTN  Clinical Indicators:  ECHO : 50% EF, Grade 1 diastolic dysfunction; 289   echo [facility]: Normal left ventricular size and systolic function with mild   concentric left ventricular hypertrophy. Mildly dilated left atrium. ?  No   grossly abnormal stenotic or regurgitant flows

## 2021-05-26 NOTE — ACP (ADVANCE CARE PLANNING)
Advance Care Planning     Advance Care Planning Activator (Inpatient)  Conversation Note      Date of ACP Conversation: 5/26/2021     Conversation Conducted with: Patient with Decision Making Capacity    ACP Activator: Erika Hyman RN        Health Care Decision Maker: self dtr Judsarwat Guillen then dtr Felisa Henriquez    Current [facility] Decision Maker:     Click here to complete Healthcare Decision Makers including section of the Healthcare Decision Maker Relationship (ie \"Primary\")  Today we documented Decision Maker(s). The patient will provide ACP documents. Care Preferences    Ventilation: \"If you were in your present state of health and suddenly became very ill and were unable to breathe on your own, what would your preference be about the use of a ventilator (breathing machine) if it were available to you? \"      Would the patient desire the use of ventilator (breathing machine)?: yes    \"If your health worsens and it becomes clear that your chance of recovery is unlikely, what would your preference be about the use of a ventilator (breathing machine) if it were available to you? \"     Would the patient desire the use of ventilator (breathing machine)?: Yes      Resuscitation  \"CPR works best to restart the heart when there is a sudden event, like a heart attack, in someone who is otherwise healthy. Unfortunately, CPR does not typically restart the heart for people who have serious health conditions or who are very sick. \"    \"In the event your heart stopped as a result of an underlying serious health condition, would you want attempts to be made to restart your heart (answer \"yes\" for attempt to resuscitate) or would you prefer a natural death (answer \"no\" for do not attempt to resuscitate)? \" yes       [] Yes   [] No   Educated Patient / Beto Ramsey regarding differences between Advance Directives and portable DNR orders.     Length of ACP Conversation in minutes:      Conversation Outcomes:  [x] ACP

## 2021-05-26 NOTE — PROGRESS NOTES
Spoke with daughter Ravi Gomez and updated on pt. Per pt daughter, pt has chrons and gets infusions for dx at [facility]. Updated ANDRÉS Stubbs NP on new information.

## 2021-05-26 NOTE — DISCHARGE INSTR - COC
Continuity of Care Form    Patient Name: Thien Rodriguez   :  1945  MRN:  [id number]    Admit date:  2021  Discharge date:  2021    Code Status Order: Full Code   Advance Directives:   [facility] Documentation       Date/Time Healthcare Directive Type of Healthcare Directive Copy in [address] Agent's Name Healthcare Agent's Phone Number    21 2213  No, patient does not have an advance directive for healthcare treatment -- -- -- -- --            Admitting Physician:  Desirae Resendez MD  PCP: Eugene Silvestre DO    Discharging Nurse: [facility] Unit/Room#: 4497/5979-63  Discharging Unit Phone Number: [phone]    Emergency Contact:   Extended Emergency Contact Information  Primary Emergency Contact: Dat Abdi  Address: N/A   [address] Phone: [phone]  Relation: Child  Secondary Emergency Contact: Rosie Martinez  Address: [facility] )[phone]   [address] Phone: [phone]  Relation: Child    Past Surgical History:  Past Surgical History:   Procedure Laterality Date    CHOLECYSTECTOMY      COLONOSCOPY  2017    COLONOSCOPY POLYPECTOMY REMOVAL HOT BIOPSY/STOMA performed by [facility]ella Beckwith DO at [facility]    FOOT FRACTURE SURGERY Left     HYSTERECTOMY      KNEE ARTHROPLASTY Left     [place] COLSC FLX W/RMVL OF TUMOR POLYP LESION SNARE TQ  2017    COLONOSCOPY POLYPECTOMY SNARE/COLD BIOPSY performed by [facility]ella Beckwith DO at [facility]    [place] EGD TRANSORAL BIOPSY SINGLE/MULTIPLE N/A 2017    EGD BIOPSY performed by Edison Qureshi MD at [facility]    TONSILLECTOMY         Immunization History:   Immunization History   Administered Date(s) Administered    Influenza, Quadv, IM, PF (6 mo and older Fluzone, Flulaval, Fluarix, and 3 yrs and older Afluria) 2017    Pneumococcal Conjugate 13-valent ([id number]) 2017    Tdap (Boostrix, Adacel) 04/26/2017       Active Problems:  Patient Active Problem List   Diagnosis Code    COPD exacerbation ([facility] J44.1    Chronic obstructive pulmonary disease with acute exacerbation ([facility] J44.1    Septic shock ([facility]) A41.9, R65.21    ARDS (adult respiratory distress syndrome) ([facility] J80    Aspiration pneumonia ([facility] J69.0    ASHLEIGH (acute kidney injury) ([facility] N17.9    Disorientation R41.0    Encephalopathy acute G93.40    Smoker F17.200    Type 2 diabetes mellitus without complication, without long-term current use of insulin ([facility]) E11.9    Acute recurrent sinusitis J01.91    Acute respiratory failure with hypoxia and hypercapnia ([facility]) J96.01, J96.02    Diarrhea R19.7    SOB (shortness of breath) R06.02    Chest pain on breathing R07.1    Acute bronchitis due to infection J20.8    Chronic respiratory failure with hypoxia ([facility]) J96.11    Chronic obstructive pulmonary disease ([facility]) J44.9    Bipolar 1 disorder ([facility]) F31.9    Depression F32.9    Dizziness R42    Polypharmacy Z79.899    Pre-syncope R55    Fall from other slipping, tripping, or stumbling W01. 0XXA    Lumbar spine tumor D49.2    BRBPR (bright red blood per rectum) K62.5    Acute colitis K52.9    Atrial fibrillation with rapid ventricular response ([facility]) I48.91    Chronic back pain M54.9, G89.29    QT prolongation R94.31    Anemia due to blood loss, acute D62    GI (gastrointestinal bleed) K92.2    Hypertension I10    Hyperlipidemia E78.5    CHF (congestive heart failure) ([facility]) I50.9    GERD (gastroesophageal reflux disease) K21.9    Asthma J45.909       Isolation/Infection:   Isolation            No Isolation          Patient Infection Status       Infection Onset Added Last Indicated Last Indicated By Review Planned Expiration Resolved Resolved By    C-diff Rule Out 05/25/21 05/25/21 05/26/21 C DIFF TOXIN/ANTIGEN (Ordered)        Resolved    COVID-19 Rule Out 05/25/21 05/25/21 05/25/21 COVID-19, Rapid (Ordered)   05/25/21 select all that are sent with patient):  {[facility] Belongings:[id number]}    RN SIGNATURE:  Electronically signed by Kirill Schaefer RN on 6/5/21 at 11:24 AM EDT    CASE MANAGEMENT/SOCIAL WORK SECTION    Inpatient Status Date: 6/5/21    Readmission Risk Assessment Score:  Readmission Risk              Risk of Unplanned Readmission:  24           Discharging to Facility/ Agency (skilled)  · Name: [facility]  · Address: [place] [address]  · [address] [place] [address]  · Phone: [phone]  · Fax:  Name: Raheel Suggs   Address: [address] [place] [address]   Phone:  [phone]  Fax:   [address] (non skilled HHA)   · Name: [address] [place] [address]  · [address] [place], [place] [address]ab Ciro  · [facility]: [phone]  · Fax: [phone]    / signature: Electronically signed by Thang Christian RN on 5/26/21 at 11:40 AM EDT    PHYSICIAN SECTION    Prognosis: Fair    Condition at Discharge: Stable    Rehab Potential (if transferring to Rehab): Fair    Recommended Labs or Other Treatments After Discharge: pt/ot  Watch for any bleeding    Physician Certification: I certify the above information and transfer of Sergio Sandoval  is necessary for the continuing treatment of the diagnosis listed and that she requires [facility] for less 30 days.      Update Admission H&P: No change in H&P    PHYSICIAN SIGNATURE:  Electronically signed by Rula Li DO on 6/5/21 at 12:55 PM EDT

## 2021-05-26 NOTE — PROGRESS NOTES
[facility]  Office: [address], DO, Veena Granger, DO, Jeffery Silva, DO, Mark Sloanwood Blood, DO, Mya Arguelles MD, Diane Escalante MD, Radha Gil MD, Diana Hector MD, Laura Edmonds MD, Chris Tineo MD, Terrie Connolly MD, Barbara Farooq MD, Reinier Sinclair DO, Carolina Saha MD, Margarita Hubbard DO, Caitlyn Presley MD,  Jeyson Negro DO, Sukh Aceves MD, Leann Medina MD, Gurdeep Espinosa MD, Celeste Miller MD, Jennifer Jackson, [facility], [facility], [facility], Marlee Drake, CNP, Felipe Navarrete, CNS, Nayana Venegas, CNP, Tanvi Bañuelos, CNP, Ginger Pedro, CNP, Saul Jansen, CNP, Sunday Abram, CNP, Janny Temple PA-C, Joaquim Essex, [facility], Joseph Killian, CNP, Raulito Mendenhall, CNP, Melo Hale, CNP, [place] Dominga, CNP, Anais Dexter, CNP, Ambrosio Garcia, [facility]    Progress Note    5/26/2021    9:35 AM    Name:   Alba Gtz  MRN:     [id number]     [place]:      [de-identified]   Room:   [address] [place] [address] Day:  1  Admit Date:  5/25/2021 12:33 PM    PCP:   Kailey Alvarez DO  Code Status:  Full Code    Subjective:     C/C:   Chief Complaint   Patient presents with    GI Bleeding     Interval History Status: not changed. Little to no change in condition overnight. Patient's hemoglobin is at 8 at the most recent lab draw after transfusion and Kcentra was administered. GI reportedly plans for EGD and colonoscopy today. Patient continues to endorse abdominal discomfort and she does suffer itching after morphine was administered last night. Pain control was transitioned to Dilaudid. Stool cultures are pending, procalcitonin is low likelihood for sepsis. Defer to GI on the need for steroid use following colonoscopy    Brief History:     5/25 -patient has a history of A. fib on Eliquis. Over the past 3 days patient has noted bright red blood per rectum. Patient reports that during that same time.   She has began experiencing worsening fatigue and shortness of breath. Patient is now to the point where she has severe shortness of breath with minimal exertion and severe dizziness and weakness while rising. Patient continues to experience dark red stools mixed with bright red blood and liquid diarrhea. CT of the abdomen shows pancolitis. GI consulted. 5/26 -patient received blood transfusions. Reportedly going for EGD and colonoscopy today. Stool culture is pending. Review of Systems:     Review of Systems   Constitutional: Positive for appetite change and fatigue. HENT: Negative for sinus pressure and sinus pain. Eyes: Negative for photophobia and visual disturbance. Respiratory: Positive for shortness of breath. Cardiovascular: Negative for chest pain, palpitations and leg swelling. Gastrointestinal: Positive for abdominal distention, abdominal pain, anal bleeding, blood in stool, diarrhea and nausea. Endocrine: Negative for cold intolerance and heat intolerance. Genitourinary: Negative for urgency. Musculoskeletal: Negative for arthralgias and myalgias. Skin: Negative for color change. Allergic/Immunologic: Negative for environmental allergies and immunocompromised state. Neurological: Positive for dizziness and weakness. Psychiatric/Behavioral: Negative for agitation and self-injury. Medications: Allergies:     Allergies   Allergen Reactions    Aspirin     Ibuprofen     Sulfa Antibiotics     Tape Mack Jazmine Tape]     Lyrica [Pregabalin] Other (See Comments)     \"made her goofy\"       Current Meds:   Scheduled Meds:    atorvastatin  40 mg Oral Nightly    budesonide-formoterol  1 puff Inhalation BID    Vitamin D  2,000 Units Oral Dinner    ferrous sulfate  325 mg Oral Dinner    fluticasone  1 spray Nasal Daily    furosemide  20 mg Oral Daily    docusate sodium  100 mg Oral Nightly    hydrOXYzine  25 mg Oral BID    magnesium oxide  400 mg Oral BID    mirtazapine  15 mg Oral Nightly    pantoprazole  40 mg Oral QAM AC    tiotropium  2 puff Inhalation Daily    tiZANidine  2 mg Oral BID    sodium chloride flush  5-40 mL Intravenous 2 times per day    buPROPion  150 mg Oral QAM    DULoxetine  90 mg Oral Daily    insulin glargine  10 Units Subcutaneous Nightly    nortriptyline  10 mg Oral Nightly    cetirizine  10 mg Oral Daily    alogliptin  12.5 mg Oral Daily     Continuous Infusions:    sodium chloride      sodium chloride 150 mL/hr at 21 2245    sodium chloride      sodium chloride       PRN Meds: sodium chloride, diphenhydrAMINE, HYDROmorphone, albuterol, glucose, loperamide, sodium chloride flush, sodium chloride, promethazine **OR** ondansetron, acetaminophen **OR** acetaminophen, albuterol sulfate HFA, LORazepam, sodium chloride    Data:     Past Medical History:   has a past medical history of ASHLEIGH (acute kidney injury) ([facility] Arthritis, Asthma, Bipolar 1 disorder ([facility] CHF (congestive heart failure) ([facility] COPD (chronic obstructive pulmonary disease) ([facility] Depression, Diabetes mellitus ([facility] Fibromyalgia, GERD (gastroesophageal reflux disease), Hyperlipidemia, Hypertension, and Pneumonia. Social History:   reports that she has been smoking. She has a 40.00 pack-year smoking history. She has never used smokeless tobacco. She reports that she does not drink alcohol and does not use drugs. Family History:   Family History   Problem Relation Age of Onset    Heart Failure Mother     Cancer Sister     Cancer Maternal Grandmother     Heart Failure Maternal Grandmother        Vitals:  BP (!) 132/40   Pulse 80   Temp 97.9 °F (36.6 °C) (Oral)   Resp 19   Ht 4' 7.91\" (1.42 m)   Wt 171 lb 15.3 oz (78 kg)   SpO2 99%   BMI 38.68 kg/m²   Temp (24hrs), Av.3 °F (36.8 °C), Min:97.7 °F (36.5 °C), Max:99 °F (37.2 °C)    Recent Labs     21  0709   POCGLU 139*       I/O (24Hr):     Intake/Output Summary (Last 24 hours) at 2021 0935  Last data filed at 5/26/2021 5711  Gross per 24 hour   Intake 1850 ml   Output 1500 ml   Net 350 ml       Labs:  Hematology:  Recent Labs     05/25/21  1300 05/25/21  1305 05/25/21  1800 05/26/21  0000 05/26/21  0436   WBC  --  12.7*  --   --  14.1*   RBC  --  2.81*  --   --  3.23*   HGB  --  6.2* 6.1* 6.9* 8.0*   HCT  --  23.1* 23.4* 24.5* 27.5*   MCV  --  82.2*  --   --  85.1   MCH  --  22.1*  --   --  24.8*   MCHC  --  26.8*  --   --  29.1   RDW  --  16.7*  --   --  16.2*   PLT  --  376  --   --  341   MPV  --  10.0  --   --  9.9   INR 1.3  --   --   --   --      Chemistry:  Recent Labs     05/25/21  1305 05/26/21  0436    139   K 4.3 3.7    107   CO2 20 22   GLUCOSE 193* 172*   BUN 28* 24*   CREATININE 1.12* 1.28*   ANIONGAP 10 10   LABGLOM 48* 41*   GFRAA 58* 49*   CALCIUM 8.3* 8.1*     Recent Labs     05/26/21  0709   POCGLU 139*     ABG:  Lab Results   Component Value Date    POCPH 7.26 12/31/2016    POCPCO2 56 12/31/2016    POCPO2 80 12/31/2016    POCHCO3 25.3 12/31/2016    NBEA 2 12/31/2016    PBEA NOT REPORTED 12/31/2016    DHY8SBR 27 12/31/2016    UIWF7QXF 93 12/31/2016    FIO2 NOT REPORTED 12/31/2016     Lab Results   Component Value Date/Time    SPECIAL NOT REPORTED 06/26/2017 10:08 PM     Lab Results   Component Value Date/Time    CULTURE ESCHERICHIA COLI 50 to 100,000 CFU/ML (A) 06/26/2017 10:08 PM    CULTURE  06/26/2017 10:08 PM     [employer] [address] [facility], [address] [place] [address] [phone]       Radiology:  CT ABDOMEN PELVIS W IV CONTRAST Additional Contrast? None    Result Date: 5/25/2021  Acute nonspecific pancolitis. Infectious or inflammatory colitis can be considered, less likely ischemic since the major visceral arteries appear patent. XR CHEST PORTABLE    Result Date: 5/26/2021  Subtle bibasilar infiltrates representing atelectasis versus pneumonia. Physical Examination:        Physical Exam  Constitutional:       General: She is in acute distress.       Appearance: She is obese. She is ill-appearing. HENT:      Head: Normocephalic and atraumatic. Nose: Nose normal.      Mouth/Throat:      Mouth: Mucous membranes are dry. Eyes:      Extraocular Movements: Extraocular movements intact. Pupils: Pupils are equal, round, and reactive to light. Cardiovascular:      Rate and Rhythm: Normal rate. Pulses: Normal pulses. Heart sounds: Normal heart sounds. No murmur heard. Pulmonary:      Effort: Respiratory distress present. Breath sounds: Normal breath sounds. Abdominal:      General: Abdomen is flat. There is distension. Tenderness: There is abdominal tenderness. Musculoskeletal:         General: No swelling or tenderness. Normal range of motion. Cervical back: Normal range of motion and neck supple. Skin:     General: Skin is warm and dry. Capillary Refill: Capillary refill takes less than 2 seconds. Coloration: Skin is pale. Neurological:      General: No focal deficit present. Mental Status: She is alert.          Assessment:        Hospital Problems         Last Modified POA    * (Principal) GI (gastrointestinal bleed) 5/25/2021 Yes    Chronic obstructive pulmonary disease with acute exacerbation ([address] 5/25/2021 Yes    ASHLEIGH (acute kidney injury) ([address] 5/25/2021 Yes    Type 2 diabetes mellitus without complication, without long-term current use of insulin ([address] 5/25/2021 Yes    Bipolar 1 disorder ([address] 5/25/2021 Yes    BRBPR (bright red blood per rectum) 5/25/2021 Yes    Acute colitis 5/25/2021 Yes    Anemia due to blood loss, acute 5/25/2021 Yes    CHF (congestive heart failure) ([address] 5/25/2021 Yes          Plan:        Acute blood loss anemia secondary to GI bleed with bright red blood per rectum and acute colitis  Redraw stat H&H then continue every 6 hours  GI consultation, reportedly scheduled for EGD and colonoscopy today  Check iron and TIBC -WNL  Continue IV fluids -no indication for fluid volume overload at this time  Pain control for abdominal discomfort transition to Dilaudid secondary to itching post morphine administration  Type 2 diabetes -better controlled today  Scheduled long-acting insulin and sliding scale insulin for mealtime corrections  Januvia   Trulicity weekly if remaining in hospital at neck scheduled dose  COPD/asthma -stable  Symbicort twice daily  DuoNeb as needed wheezing  Education on the need for diet lifestyle modifications  Bipolar -stable  Wellbutrin, Cymbalta, Atarax, Nortriptyline  CHF -stable without respiratory distress, no indication for fluid volume overload  P.O.  Lasix as ordered         LONDON Medellin - JOSE  5/26/2021  9:35 AM

## 2021-05-26 NOTE — OP NOTE
[facility]     PROCEDURE DATE: 05/26/21    REFERRING PHYSICIAN: No ref. provider found     PRIMARY CARE PROVIDER: Radha Marlow DO    ATTENDING PHYSICIAN: Jhon Davidson MD     HISTORY: Ms. Samuel Cardenas is a 77 y.o. female who presents to the [place] [address] Endoscopy unit for colonoscopy. The patient's clinical history is remarkable for rectal bleeding. She is currently medically stable and appropriate for the planned procedure. PREOPERATIVE DIAGNOSIS: rectal bleeding. PROCEDURES:   Transanal Colonoscopy with biopsy. POSTPROCEDURE DIAGNOSIS:  Blood throughout the colon  Fair prep  Mild diffuse colitis    SPECIMENS: biopsy    MEDICATIONS:     MAC per anesthesia    EBL: minimal    INSTRUMENT: Olympus GIF H 190 Gastroscope. PREPARATION: The nature and character of the procedure as well as risks, benefits, and alternatives were discussed with the patient and informed consent was obtained. Complications were said to include, but were not limited to: medication allergy, medication reaction, cardiovascular and respiratory problems, bleeding, perforation, infection, and/or missed diagnosis. Following arrival in the endoscopy room, the patient was placed in the left lateral decubitus position and final time-out accomplished in the presence of the nursing staff. Baseline vital signs were obtained and reviewed, and IV sedation was subsequently initiated. FINDINGS: Rectal examination demonstrated no significant visible external abnormality and digital palpation was unremarkable. Following adequate conscious sedation the colonoscope was introduced and advanced under direct visualization to the ascending colon. The bowel preparation was felt to be fair. This included moderate amounts of bloody, thick stool that was not able to be adequately irrigated and aspirated. Cecal intubation time was 4 minutes.      Once maximally inserted, the endoscope was withdrawn and the mucosa was carefully inspected. The mucosal exam showed mild diffuse colitis throughout the colon. Mucosal oozing was noted in multiple areas. No discrete mass. Prep was fair thus evaluation was limited. RECOMMENDATIONS:   1) Transfer back to the floor for further management as per primary care team.   2) Start IV solumedrol (monitor blood sugar levels). 3) Clear liquid diet.         Vitaliy Lerma

## 2021-05-26 NOTE — PROGRESS NOTES
Writer stayed with the patient for the first 15 minutes of the blood transfusion. There were no signs of a transfusion reaction. The patient is tolerating well.

## 2021-05-26 NOTE — PLAN OF CARE
Problem: Falls - Risk of:  Goal: Will remain free from falls  Description: Will remain free from falls  Outcome: Ongoing     Problem: Falls - Risk of:  Goal: Absence of physical injury  Description: Absence of physical injury  Outcome: Ongoing     Problem: Skin Integrity:  Goal: Will show no infection signs and symptoms  Description: Will show no infection signs and symptoms  Outcome: Ongoing     Problem: Skin Integrity:  Goal: Absence of new skin breakdown  Description: Absence of new skin breakdown  Outcome: Ongoing     Problem: Infection:  Goal: Will remain free from infection  Description: Will remain free from infection  Outcome: Ongoing     Problem: Safety:  Goal: Free from accidental physical injury  Description: Free from accidental physical injury  Outcome: Ongoing     Problem: Safety:  Goal: Free from intentional harm  Description: Free from intentional harm  Outcome: Ongoing     Problem: Daily Care:  Goal: Daily care needs are met  Description: Daily care needs are met  Outcome: Ongoing     Problem: Pain:  Goal: Patient's pain/discomfort is manageable  Description: Patient's pain/discomfort is manageable  Outcome: Ongoing     Problem: Skin Integrity:  Goal: Skin integrity will stabilize  Description: Skin integrity will stabilize  Outcome: Ongoing

## 2021-05-27 LAB
ANION GAP SERPL CALCULATED.3IONS-SCNC: 12 MMOL/L (ref 9–17)
BUN BLDV-MCNC: 14 MG/DL (ref 8–23)
BUN/CREAT BLD: 14 (ref 9–20)
CALCIUM SERPL-MCNC: 7.3 MG/DL (ref 8.6–10.4)
CAMPYLOBACTER PCR: NORMAL
CHLORIDE BLD-SCNC: 109 MMOL/L (ref 98–107)
CO2: 17 MMOL/L (ref 20–31)
CREAT SERPL-MCNC: 0.98 MG/DL (ref 0.5–0.9)
E COLI ENTEROTOXIGENIC PCR: NORMAL
GFR AFRICAN AMERICAN: >60 ML/MIN
GFR NON-AFRICAN AMERICAN: 55 ML/MIN
GFR SERPL CREATININE-BSD FRML MDRD: ABNORMAL ML/MIN/{1.73_M2}
GFR SERPL CREATININE-BSD FRML MDRD: ABNORMAL ML/MIN/{1.73_M2}
GLUCOSE BLD-MCNC: 185 MG/DL (ref 65–105)
GLUCOSE BLD-MCNC: 200 MG/DL (ref 65–105)
GLUCOSE BLD-MCNC: 234 MG/DL (ref 65–105)
GLUCOSE BLD-MCNC: 245 MG/DL (ref 70–99)
GLUCOSE BLD-MCNC: 275 MG/DL (ref 65–105)
HCT VFR BLD CALC: 21.2 % (ref 36.3–47.1)
HCT VFR BLD CALC: 21.9 % (ref 36.3–47.1)
HCT VFR BLD CALC: 22.8 % (ref 36.3–47.1)
HCT VFR BLD CALC: 25 % (ref 36.3–47.1)
HCT VFR BLD CALC: 25.9 % (ref 36.3–47.1)
HEMOGLOBIN: 6.5 G/DL (ref 11.9–15.1)
HEMOGLOBIN: 6.6 G/DL (ref 11.9–15.1)
HEMOGLOBIN: 7 G/DL (ref 11.9–15.1)
HEMOGLOBIN: 7.6 G/DL (ref 11.9–15.1)
HEMOGLOBIN: 7.7 G/DL (ref 11.9–15.1)
PLESIOMONAS SHIGELLOIDES PCR: NORMAL
POTASSIUM SERPL-SCNC: 3.9 MMOL/L (ref 3.7–5.3)
SALMONELLA PCR: NORMAL
SHIGATOXIN GENE PCR: NORMAL
SHIGELLA SP PCR: NORMAL
SODIUM BLD-SCNC: 138 MMOL/L (ref 135–144)
SPECIMEN DESCRIPTION: NORMAL
VIBRIO PCR: NORMAL
YERSINIA ENTEROCOLITICA PCR: NORMAL

## 2021-05-27 PROCEDURE — 6360000002 HC RX W HCPCS: Performed by: INTERNAL MEDICINE

## 2021-05-27 PROCEDURE — APPNB30 APP NON BILLABLE TIME 0-30 MINS: Performed by: NURSE PRACTITIONER

## 2021-05-27 PROCEDURE — 85014 HEMATOCRIT: CPT

## 2021-05-27 PROCEDURE — 6370000000 HC RX 637 (ALT 250 FOR IP): Performed by: NURSE PRACTITIONER

## 2021-05-27 PROCEDURE — 2700000000 HC OXYGEN THERAPY PER DAY

## 2021-05-27 PROCEDURE — 94761 N-INVAS EAR/PLS OXIMETRY MLT: CPT

## 2021-05-27 PROCEDURE — 36415 COLL VENOUS BLD VENIPUNCTURE: CPT

## 2021-05-27 PROCEDURE — 1200000000 HC SEMI PRIVATE

## 2021-05-27 PROCEDURE — 82947 ASSAY GLUCOSE BLOOD QUANT: CPT

## 2021-05-27 PROCEDURE — 2580000003 HC RX 258: Performed by: INTERNAL MEDICINE

## 2021-05-27 PROCEDURE — APPSS45 APP SPLIT SHARED TIME 31-45 MINUTES: Performed by: NURSE PRACTITIONER

## 2021-05-27 PROCEDURE — 80048 BASIC METABOLIC PNL TOTAL CA: CPT

## 2021-05-27 PROCEDURE — 6370000000 HC RX 637 (ALT 250 FOR IP): Performed by: INTERNAL MEDICINE

## 2021-05-27 PROCEDURE — P9016 RBC LEUKOCYTES REDUCED: HCPCS

## 2021-05-27 PROCEDURE — 99232 SBSQ HOSP IP/OBS MODERATE 35: CPT | Performed by: INTERNAL MEDICINE

## 2021-05-27 PROCEDURE — APPSS30 APP SPLIT SHARED TIME 16-30 MINUTES: Performed by: NURSE PRACTITIONER

## 2021-05-27 PROCEDURE — 85018 HEMOGLOBIN: CPT

## 2021-05-27 PROCEDURE — 94640 AIRWAY INHALATION TREATMENT: CPT

## 2021-05-27 PROCEDURE — 86900 BLOOD TYPING SEROLOGIC ABO: CPT

## 2021-05-27 RX ORDER — DEXTROSE MONOHYDRATE 50 MG/ML
100 INJECTION, SOLUTION INTRAVENOUS PRN
Status: DISCONTINUED | OUTPATIENT
Start: 2021-05-27 | End: 2021-06-05 | Stop reason: HOSPADM

## 2021-05-27 RX ORDER — DEXTROSE MONOHYDRATE 25 G/50ML
12.5 INJECTION, SOLUTION INTRAVENOUS PRN
Status: DISCONTINUED | OUTPATIENT
Start: 2021-05-27 | End: 2021-06-05 | Stop reason: HOSPADM

## 2021-05-27 RX ORDER — INSULIN GLARGINE 100 [IU]/ML
10 INJECTION, SOLUTION SUBCUTANEOUS 2 TIMES DAILY
Status: DISCONTINUED | OUTPATIENT
Start: 2021-05-27 | End: 2021-05-30

## 2021-05-27 RX ORDER — SODIUM CHLORIDE 0.9 % (FLUSH) 0.9 %
5-40 SYRINGE (ML) INJECTION EVERY 12 HOURS
Status: DISCONTINUED | OUTPATIENT
Start: 2021-05-27 | End: 2021-06-05 | Stop reason: HOSPADM

## 2021-05-27 RX ORDER — SODIUM CHLORIDE 9 MG/ML
INJECTION, SOLUTION INTRAVENOUS PRN
Status: DISCONTINUED | OUTPATIENT
Start: 2021-05-27 | End: 2021-05-29 | Stop reason: SDUPTHER

## 2021-05-27 RX ORDER — NICOTINE POLACRILEX 4 MG
15 LOZENGE BUCCAL PRN
Status: DISCONTINUED | OUTPATIENT
Start: 2021-05-27 | End: 2021-06-05 | Stop reason: HOSPADM

## 2021-05-27 RX ADMIN — ALBUTEROL SULFATE 2.5 MG: 2.5 SOLUTION RESPIRATORY (INHALATION) at 20:04

## 2021-05-27 RX ADMIN — NORTRIPTYLINE HYDROCHLORIDE 10 MG: 10 CAPSULE ORAL at 21:01

## 2021-05-27 RX ADMIN — PANTOPRAZOLE SODIUM 40 MG: 40 TABLET, DELAYED RELEASE ORAL at 06:12

## 2021-05-27 RX ADMIN — LORAZEPAM 0.25 MG: 0.5 TABLET ORAL at 23:51

## 2021-05-27 RX ADMIN — ALOGLIPTIN 12.5 MG: 12.5 TABLET, FILM COATED ORAL at 09:25

## 2021-05-27 RX ADMIN — ONDANSETRON 4 MG: 2 INJECTION INTRAMUSCULAR; INTRAVENOUS at 21:01

## 2021-05-27 RX ADMIN — SODIUM CHLORIDE, PRESERVATIVE FREE 10 ML: 5 INJECTION INTRAVENOUS at 09:00

## 2021-05-27 RX ADMIN — BUPROPION HYDROCHLORIDE 150 MG: 150 TABLET, EXTENDED RELEASE ORAL at 09:25

## 2021-05-27 RX ADMIN — INSULIN LISPRO 2 UNITS: 100 INJECTION, SOLUTION INTRAVENOUS; SUBCUTANEOUS at 13:07

## 2021-05-27 RX ADMIN — HYDROXYZINE HYDROCHLORIDE 25 MG: 25 TABLET, FILM COATED ORAL at 09:25

## 2021-05-27 RX ADMIN — MIRTAZAPINE 15 MG: 15 TABLET, FILM COATED ORAL at 21:01

## 2021-05-27 RX ADMIN — MAGNESIUM GLUCONATE 500 MG ORAL TABLET 400 MG: 500 TABLET ORAL at 09:25

## 2021-05-27 RX ADMIN — MAGNESIUM GLUCONATE 500 MG ORAL TABLET 400 MG: 500 TABLET ORAL at 21:00

## 2021-05-27 RX ADMIN — INSULIN LISPRO 3 UNITS: 100 INJECTION, SOLUTION INTRAVENOUS; SUBCUTANEOUS at 21:10

## 2021-05-27 RX ADMIN — ATORVASTATIN CALCIUM 40 MG: 40 TABLET, FILM COATED ORAL at 21:00

## 2021-05-27 RX ADMIN — HYDROXYZINE HYDROCHLORIDE 25 MG: 25 TABLET, FILM COATED ORAL at 21:00

## 2021-05-27 RX ADMIN — FERROUS SULFATE TAB EC 325 MG (65 MG FE EQUIVALENT) 325 MG: 325 (65 FE) TABLET DELAYED RESPONSE at 17:46

## 2021-05-27 RX ADMIN — Medication 2000 UNITS: at 17:46

## 2021-05-27 RX ADMIN — Medication 1 MG: at 15:42

## 2021-05-27 RX ADMIN — FLUTICASONE PROPIONATE 1 SPRAY: 50 SPRAY, METERED NASAL at 09:25

## 2021-05-27 RX ADMIN — ONDANSETRON 4 MG: 2 INJECTION INTRAMUSCULAR; INTRAVENOUS at 13:09

## 2021-05-27 RX ADMIN — BUDESONIDE AND FORMOTEROL FUMARATE DIHYDRATE 1 PUFF: 160; 4.5 AEROSOL RESPIRATORY (INHALATION) at 10:34

## 2021-05-27 RX ADMIN — Medication 1 MG: at 01:13

## 2021-05-27 RX ADMIN — ONDANSETRON 4 MG: 2 INJECTION INTRAMUSCULAR; INTRAVENOUS at 05:12

## 2021-05-27 RX ADMIN — SODIUM CHLORIDE: 9 INJECTION, SOLUTION INTRAVENOUS at 04:58

## 2021-05-27 RX ADMIN — TIZANIDINE 2 MG: 2 TABLET ORAL at 21:00

## 2021-05-27 RX ADMIN — BUDESONIDE AND FORMOTEROL FUMARATE DIHYDRATE 1 PUFF: 160; 4.5 AEROSOL RESPIRATORY (INHALATION) at 20:04

## 2021-05-27 RX ADMIN — METHYLPREDNISOLONE SODIUM SUCCINATE 40 MG: 40 INJECTION, POWDER, FOR SOLUTION INTRAMUSCULAR; INTRAVENOUS at 09:16

## 2021-05-27 RX ADMIN — DULOXETINE HYDROCHLORIDE 90 MG: 60 CAPSULE, DELAYED RELEASE ORAL at 09:25

## 2021-05-27 RX ADMIN — FUROSEMIDE 20 MG: 20 TABLET ORAL at 09:25

## 2021-05-27 RX ADMIN — INSULIN LISPRO 4 UNITS: 100 INJECTION, SOLUTION INTRAVENOUS; SUBCUTANEOUS at 17:46

## 2021-05-27 RX ADMIN — TIOTROPIUM BROMIDE INHALATION SPRAY 2 PUFF: 3.12 SPRAY, METERED RESPIRATORY (INHALATION) at 10:33

## 2021-05-27 RX ADMIN — ALBUTEROL SULFATE 2.5 MG: 2.5 SOLUTION RESPIRATORY (INHALATION) at 14:28

## 2021-05-27 RX ADMIN — TIZANIDINE 2 MG: 2 TABLET ORAL at 09:25

## 2021-05-27 RX ADMIN — Medication 1 MG: at 21:01

## 2021-05-27 RX ADMIN — ALBUTEROL SULFATE 2.5 MG: 2.5 SOLUTION RESPIRATORY (INHALATION) at 10:32

## 2021-05-27 ASSESSMENT — PAIN SCALES - GENERAL
PAINLEVEL_OUTOF10: 3
PAINLEVEL_OUTOF10: 8
PAINLEVEL_OUTOF10: 9
PAINLEVEL_OUTOF10: 9
PAINLEVEL_OUTOF10: 8
PAINLEVEL_OUTOF10: 0
PAINLEVEL_OUTOF10: 8
PAINLEVEL_OUTOF10: 8
PAINLEVEL_OUTOF10: 4

## 2021-05-27 ASSESSMENT — PAIN DESCRIPTION - PAIN TYPE: TYPE: ACUTE PAIN

## 2021-05-27 ASSESSMENT — PAIN DESCRIPTION - ONSET: ONSET: GRADUAL

## 2021-05-27 ASSESSMENT — ENCOUNTER SYMPTOMS
NAUSEA: 1
ANAL BLEEDING: 1
PHOTOPHOBIA: 0
SINUS PAIN: 0
ABDOMINAL PAIN: 1
ABDOMINAL DISTENTION: 1
BLOOD IN STOOL: 1
SHORTNESS OF BREATH: 1
SINUS PRESSURE: 0
COLOR CHANGE: 0
DIARRHEA: 1

## 2021-05-27 ASSESSMENT — PAIN DESCRIPTION - DESCRIPTORS: DESCRIPTORS: CRAMPING

## 2021-05-27 ASSESSMENT — PAIN DESCRIPTION - LOCATION: LOCATION: ABDOMEN

## 2021-05-27 ASSESSMENT — PAIN DESCRIPTION - FREQUENCY: FREQUENCY: CONTINUOUS

## 2021-05-27 NOTE — PROGRESS NOTES
[facility]    Gastroenterology Daily Progress Note      Patient:   Lesvia Morales   :    1945   Facility:   [facility]  Date:     2021  Consultant:   LONDON Moody CNP, CNP      SUBJECTIVE  77 y.o. female admitted 2021 with GI (gastrointestinal bleed) [K92.2] and seen for colitis. The pt was seen and examined. She is s/p egd and colonoscopy yesterday. Report multiple bloody BM's overnight with mid abdominal pain. She was started on IV steroids yesterday. Today she has not had a BM continues to have mild mid abdominal pain but improved from last night. She is tolerating ice cream. Bx results are pending.  .        OBJECTIVE  Scheduled Meds:   insulin lispro  0-12 Units Subcutaneous TID WC    insulin lispro  0-6 Units Subcutaneous Nightly    insulin glargine  10 Units Subcutaneous BID    methylPREDNISolone  40 mg Intravenous Daily    atorvastatin  40 mg Oral Nightly    budesonide-formoterol  1 puff Inhalation BID    Vitamin D  2,000 Units Oral Dinner    ferrous sulfate  325 mg Oral Dinner    fluticasone  1 spray Nasal Daily    furosemide  20 mg Oral Daily    docusate sodium  100 mg Oral Nightly    hydrOXYzine  25 mg Oral BID    magnesium oxide  400 mg Oral BID    mirtazapine  15 mg Oral Nightly    pantoprazole  40 mg Oral QAM AC    tiotropium  2 puff Inhalation Daily    tiZANidine  2 mg Oral BID    sodium chloride flush  5-40 mL Intravenous 2 times per day    buPROPion  150 mg Oral QAM    DULoxetine  90 mg Oral Daily    nortriptyline  10 mg Oral Nightly    cetirizine  10 mg Oral Daily    alogliptin  12.5 mg Oral Daily       Vital Signs:  BP (!) 124/49   Pulse 91   Temp 97.7 °F (36.5 °C) (Oral)   Resp 18   Ht 4' 7.91\" (1.42 m)   Wt 178 lb 3.2 oz (80.8 kg)   SpO2 99%   BMI 40.09 kg/m²      Physical Exam:     General Appearance: alert and oriented to person, place and time, well-developed and well-nourished, in no acute distress  Skin: warm and dry, no rash or erythema  Head: normocephalic and atraumatic  Eyes: pupils equal, round, and reactive to light, extraocular eye movements intact, conjunctivae normal  ENT: hearing grossly normal bilaterally  Neck: neck supple and non tender without mass, no thyromegaly or thyroid nodules, no cervical lymphadenopathy   Pulmonary/Chest: clear to auscultation bilaterally- no wheezes, rales or rhonchi, normal air movement, no respiratory distress  Cardiovascular: normal rate, regular rhythm, normal S1 and S2, no murmurs, rubs, clicks or gallops, distal pulses intact, no carotid bruits  Abdomen: soft, obese mild mid abd tenderness, non-distended, normal bowel sounds, no masses or organomegaly  Extremities: no cyanosis, clubbing or edema  Musculoskeletal: normal range of motion, no joint swelling, deformity or tenderness  Neurologic: no cranial nerve deficit and muscle strength normal    Lab and Imaging Review     CBC  Recent Labs     05/25/21  1305 05/26/21  0436 05/27/21  0319 05/27/21  0551 05/27/21  1118   WBC 12.7* 14.1*  --   --   --    HGB 6.2* 8.0* 7.7* 6.6* 7.6*   HCT 23.1* 27.5* 25.9* 21.9* 25.0*   MCV 82.2* 85.1  --   --   --     341  --   --   --        BMP  Recent Labs     05/25/21  1305 05/26/21  0436 05/27/21  0551    139 138   K 4.3 3.7 3.9    107 109*   CO2 20 22 17*   BUN 28* 24* 14   CREATININE 1.12* 1.28* 0.98*   GLUCOSE 193* 172* 245*   CALCIUM 8.3* 8.1* 7.3*       PT/INR  Recent Labs     05/25/21  1300   PROTIME 16.1*   INR 1.3         ANEMIA STUDIES  Recent Labs     05/26/21  0436   LABIRON 26   TIBC 330   colonoscopy dr Adonis Goyal 5/26/21  FINDINGS: Rectal examination demonstrated no significant visible external abnormality and digital palpation was unremarkable. Following adequate conscious sedation the colonoscope was introduced and advanced under direct visualization to the ascending colon. The bowel preparation was felt to be fair.  This included moderate amounts of

## 2021-05-27 NOTE — PROGRESS NOTES
Comprehensive Nutrition Assessment    Type and Reason for Visit:  Positive Nutrition Screen    Nutrition Recommendations/Plan:   1 Current diet clear liquid   2 Recommend NPO and start on TPN for bowel rest due to GI bleed  3 Monitor p o intakes, labs, weights, and tolerance of CLD    Nutrition Assessment:  Patient admitted for gastrointestinal bleed. Patient has history of Crohn's Disease and was receiving I.V infusions and steroids. Bright red stool with diarrhea present. Patient broke leg about a year ago and was not able to get infusions anymore. Appetite is poor and patient has had some weight decrease. Patient received 5 units of blood due to low HgB (6.6). Patient is on CLD and is not consuming diet. Recommend patient to be NPO for bowel rest and started on TPN. Monitor p o intakes, labs, weights, and tolerance of CLD. Malnutrition Assessment:  Malnutrition Status: At risk for malnutrition (Comment)    Context:  Acute Illness     Findings of the 6 clinical characteristics of malnutrition:  Energy Intake:  7 - 50% or less of estimated energy requirements for 5 or more days  Weight Loss:  Unable to assess     Body Fat Loss:  Unable to assess     Muscle Mass Loss:  Unable to assess    Fluid Accumulation:  No significant fluid accumulation     Strength:  Not Performed    Estimated Daily Nutrient Needs:  Energy (kcal):  1230 kcal ([place], stress factor 1.1); Weight Used for Energy Requirements:  Current     Protein (g):  68 grams (2.0g/kg); Weight Used for Protein Requirements:  Ideal         Nutrition Related Findings:  GI status: Active GI bleeding. Bowel sounds active. Diarrhea and cramping. No edema present. Hg 6.6. PRBC transfusion 5 units      Wounds:  None       Current Nutrition Therapies:    DIET CLEAR LIQUID; Anthropometric Measures:  · Height: 4' 7\" (139.7 cm)  · Current Body Weight: 171 lb 2.1 oz (77.6 kg) (Weight of #171lb on 5/26 used.   Most likely current weight of #178 is inaccurate)   · Admission Body Weight: 172 lb (78 kg)     · Ideal Body Weight: 75 lbs; % Ideal Body Weight 228.2 %   · BMI: 39.8  · Adjusted Body Weight:  ; No Adjustment   · BMI Categories: Obese Class 2 (BMI 35.0 -39.9)       Nutrition Diagnosis:   · Altered GI function related to altered GI function (G I bleed) as evidenced by lab values, intake 0-25%, diarrhea      Nutrition Interventions:   Food and/or Nutrient Delivery:  Modify Current Diet  Nutrition Education/Counseling:  No recommendation at this time   Coordination of Nutrition Care:  Continue to monitor while inpatient    Goals:  p o intake >50%       Nutrition Monitoring and Evaluation:   Food/Nutrient Intake Outcomes:  Supplement Intake, Diet Advancement/Tolerance, Food and Nutrient Intake  Physical Signs/Symptoms Outcomes:  Biochemical Data, Skin, Weight, Diarrhea, GI Status     Discharge Planning:     Too soon to determine     [address] Intern    Reviewed and approved by  Lorie BUTTSN, RDN, LDN  Lead Clinical Dietitian  RD Office Phone [phone]

## 2021-05-27 NOTE — PLAN OF CARE
Problem: Falls - Risk of:  Goal: Will remain free from falls  Description: Will remain free from falls  5/27/2021 1714 by Damaris Regalado RN  Outcome: Ongoing     Problem: Falls - Risk of:  Goal: Absence of physical injury  Description: Absence of physical injury  5/27/2021 1714 by Damaris Regalado RN  Outcome: Ongoing     Problem: Skin Integrity:  Goal: Will show no infection signs and symptoms  Description: Will show no infection signs and symptoms  5/27/2021 1714 by Damaris Regalado RN  Outcome: Ongoing     Problem: Skin Integrity:  Goal: Absence of new skin breakdown  Description: Absence of new skin breakdown  5/27/2021 1714 by Damaris Regalado RN  Outcome: Ongoing     Problem: Infection:  Goal: Will remain free from infection  Description: Will remain free from infection  5/27/2021 1714 by Damaris Regalado RN  Outcome: Ongoing     Problem: Safety:  Goal: Free from accidental physical injury  Description: Free from accidental physical injury  5/27/2021 1714 by Damaris Regalado RN  Outcome: Ongoing     Problem: Safety:  Goal: Free from intentional harm  Description: Free from intentional harm  5/27/2021 1714 by Damaris Regalado RN  Outcome: Ongoing     Problem: Daily Care:  Goal: Daily care needs are met  Description: Daily care needs are met  5/27/2021 1714 by Damaris Regalado RN  Outcome: Ongoing     Problem: Pain:  Goal: Patient's pain/discomfort is manageable  Description: Patient's pain/discomfort is manageable  5/27/2021 1714 by Damaris Regalado RN  Outcome: Ongoing     Problem: Skin Integrity:  Goal: Skin integrity will stabilize  Description: Skin integrity will stabilize  5/27/2021 1714 by Damaris Regalado RN  Outcome: Ongoing     Problem: Discharge Planning:  Goal: Patients continuum of care needs are met  Description: Patients continuum of care needs are met  5/27/2021 1714 by Damaris Regalado RN  Outcome: Ongoing     Problem: Nutrition  Goal: Optimal nutrition therapy  Outcome: Ongoing

## 2021-05-27 NOTE — PROGRESS NOTES
Writer Spoke with Daughter Marleni Love she mentioned she would like to be here when they speak with her mother about the results from the procedure (EGD). Daughter wanted to know when physician would round, she was informed that they around all at different times. Writer told daughter that she would pass on to day shift she would like physician to call her if she misses the them.

## 2021-05-27 NOTE — PLAN OF CARE
Nutrition Problem #1: Altered GI function  Intervention: Food and/or Nutrient Delivery: Modify Current Diet  Nutritional Goals: p o intake >50%

## 2021-05-27 NOTE — PROGRESS NOTES
Physical Therapy  DATE: 2021    NAME: Samuel Cardenas  MRN: [id number]   : 1945    Patient not seen this date for Physical Therapy due to:  [] Blood transfusion in progress  [] Cancel by RN  [] Hemodialysis  [x]  Refusal by Patient   [] Spine Precautions   [] Strict Bedrest  [] Surgery  [] Testing      [x] Other Patient had been up all night with diarrhea and still does not feel well, RN aware       [] PT being discontinued at this time. Patient independent. No further needs. [] PT being discontinued at this time as the patient has been transferred to hospice care. No further needs.     Sue Cohen, PT

## 2021-05-27 NOTE — PROGRESS NOTES
Physician Progress Note      Lew Gomez  CSN #:                  [id number]  :                       1945  ADMIT DATE:       2021 12:33 PM  [address] [place] DATE:  RESPONDING  PROVIDER #:        Sage Shaikh MD          QUERY TEXT:    Patient admitted with GIB. Noted documentation of COPD exacerbation active   problem list on H&P dated  and COPD stable H&P . If possible, please document in progress notes and discharge summary if you   are evaluating and /or treating any of the following: The medical record reflects the following:  Risk Factors: COPD/Asthma  Clinical Indicators:  H&P Respiratory: Positive for chest tightness and   shortness of breath. Negative for wheezing. not requiring steroids  Treatment: home COPD medications: Proventil nebulizer tx, Ventolin inhaler,   Symbicort; 3L home oxygen. Options provided:  -- COPD exacerbation confirmed and treated  -- COPD stable, no exacerbation  -- Other - I will add my own diagnosis  -- Disagree - Not applicable / Not valid  -- Disagree - Clinically unable to determine / Unknown  -- Refer to Clinical Documentation Reviewer    PROVIDER RESPONSE TEXT:    COPD stable with no current exacerbation.     Query created by: Felix Almendarez on 2021 12:43 PM      Electronically signed by:  Sage Shaikh MD 2021 12:52 PM

## 2021-05-27 NOTE — CARE COORDINATION
0505:  To bedside per nurses request for evaluation. Patient upset states she has had 6 bloody stools large amounts of blood expelled with large clots. Patient tearful. Finished unit of blood approximately 1.5 hours ago. Posttransfusion hemoglobin 7.7. Nurse says she has had several bloody stools since. Will recheck H/H stat. Monitor vital signs. Call out to Dr. Galilea Florentino per nursing staff. Will reevaluate as needed. 4142:   Noted large gelatinous bloody stool. New hemoglobin 6.6. Packed red blood cells x1 unit stat ordered.

## 2021-05-27 NOTE — PROGRESS NOTES
Blood consent was confirmed   Blood was dual signed per writer and sheyla [address] [place] [address] RN,   Blood touched vein @ 0651  Vitals stable. Vitals were taken within 30 minutes prior to blood administration and 15 minutes after start    Patient was observed for first 15 minutes per writer  No reaction noted.  Will continue to monitor

## 2021-05-27 NOTE — FLOWSHEET NOTE
05/26/21 9750   Provider Notification   Reason for Communication Critical Value (comment)  (Hemoglobin 6.8)   Provider Name Blanca Mon   Provider Notification Advance Practice Clinician (CNS, NP, CNM, CRNA, PA)   Method of Communication Secure Message   Response Waiting for response   Notification Time [phone]

## 2021-05-27 NOTE — PROGRESS NOTES
Blood consent was confirmed   Blood was dual signed per writer and sheyla Gao RN,   Blood touched vein @ 0044  Vitals stable. Vitals were taken within 30 minutes prior to blood administration and 15 minutes after start    Patient was observed for first 15 minutes per writer  No reaction noted.  Will continue to monitor

## 2021-05-27 NOTE — PROGRESS NOTES
Patient has confirmed  Colitis for colonoscopy yesterday.  Does not meet critera to send CDIFF sample     Order discontinued per protocol

## 2021-05-27 NOTE — PROGRESS NOTES
[facility]  Office: [address], DO, Elsy Munoz, DO, Juhi Love, DO, Darrel Alvarez Blood, DO, Cally Montoya MD, Roman Causey MD, Lisa Hooper MD, Aide Heller MD, Mary Jeffries MD, Danya Howell MD, Seb Garg MD, Lenin Villalba MD, Anna Cesar, DO, Eusebia Alaniz MD, Mennie Holter, DO, Wilmer Naranjo MD,  Angelo Saleh, DO, Beth Plaza MD, Taylor Jackson MD, Gianni Alonso MD, Cory Lane MD, Connor Kat, [facility], [facility] Simon, CNP, Tika Alamo, [facility], Bianka Rock, CNS, Azeb Ochoa, CNP, Madeline Paul, CNP, Azam Sanchez, CNP, Matthew Denise, CNP, Loulou Gill, CNP, Yosef Huynh PA-C, Luciano Ponce, [facility], Jie Angulo, CNP, Chary Greco, CNP, Frank Luis, CNP, Jessica Schafer, CNP, Randolph Cervantes, [facility], Elisha Parrish [facility]    Progress Note    5/27/2021    9:25 AM    Name:   Jasmin Jansen  MRN:     [id number]     [place]:      [de-identified]   Room:   [address] [place] [address] Day:  2  Admit Date:  5/25/2021 12:33 PM    PCP:   Dahiana Arnett DO  Code Status:  Full Code    Subjective:     C/C:   Chief Complaint   Patient presents with    GI Bleeding     Interval History Status: not changed. Little to no change in condition overnight. GI completed EGD and colonoscopy yesterday and showed diffuse colitis. Patient continues to endorse abdominal discomfort. Steroids started per GI following scopes. Inflammatory bowel disease with diffuse colitis noted in GI note. Still passing blood and has now required 5 units of blood. Brief History:     5/25 -patient has a history of A. fib on Eliquis. Over the past 3 days patient has noted bright red blood per rectum. Patient reports that during that same time. She has began experiencing worsening fatigue and shortness of breath.   Patient is now to the point where she has severe shortness of breath with minimal exertion and severe dizziness and weakness while rising. Patient continues to experience dark red stools mixed with bright red blood and liquid diarrhea. CT of the abdomen shows pancolitis. GI consulted. 5/26 -patient received blood transfusions. Reportedly going for EGD and colonoscopy today. Stool culture is pending. 5/27 - Little change. Steroids started per GI following scopes. Inflammatory bowel disease with diffuse colitis noted in GI note. Still passing blood and has now required 5 units of blood. Review of Systems:     Review of Systems   Constitutional: Positive for appetite change and fatigue. HENT: Negative for sinus pressure and sinus pain. Eyes: Negative for photophobia and visual disturbance. Respiratory: Positive for shortness of breath. Cardiovascular: Negative for chest pain, palpitations and leg swelling. Gastrointestinal: Positive for abdominal distention, abdominal pain, anal bleeding, blood in stool, diarrhea and nausea. Endocrine: Negative for cold intolerance and heat intolerance. Genitourinary: Negative for urgency. Musculoskeletal: Negative for arthralgias and myalgias. Skin: Negative for color change. Allergic/Immunologic: Negative for environmental allergies and immunocompromised state. Neurological: Positive for dizziness and weakness. Psychiatric/Behavioral: Negative for agitation and self-injury. Medications: Allergies:     Allergies   Allergen Reactions    Aspirin     Ibuprofen     Sulfa Antibiotics     Tape Ilan See Tape]     Lyrica [Pregabalin] Other (See Comments)     \"made her goofy\"       Current Meds:   Scheduled Meds:    insulin lispro  0-12 Units Subcutaneous TID WC    insulin lispro  0-6 Units Subcutaneous Nightly    insulin glargine  10 Units Subcutaneous BID    methylPREDNISolone  40 mg Intravenous Daily    atorvastatin  40 mg Oral Nightly    budesonide-formoterol  1 puff Inhalation BID    Vitamin D  2,000 Units Oral Dinner    ferrous sulfate  325 mg Oral Dinner    fluticasone  1 spray Nasal Daily    furosemide  20 mg Oral Daily    docusate sodium  100 mg Oral Nightly    hydrOXYzine  25 mg Oral BID    magnesium oxide  400 mg Oral BID    mirtazapine  15 mg Oral Nightly    pantoprazole  40 mg Oral QAM AC    tiotropium  2 puff Inhalation Daily    tiZANidine  2 mg Oral BID    sodium chloride flush  5-40 mL Intravenous 2 times per day    buPROPion  150 mg Oral QAM    DULoxetine  90 mg Oral Daily    nortriptyline  10 mg Oral Nightly    cetirizine  10 mg Oral Daily    alogliptin  12.5 mg Oral Daily     Continuous Infusions:    sodium chloride      sodium chloride      dextrose      sodium chloride      sodium chloride 150 mL/hr at 05/27/21 0458    sodium chloride       PRN Meds: sodium chloride, sodium chloride, glucose, dextrose, glucagon (rDNA), dextrose, diphenhydrAMINE, HYDROmorphone, sodium chloride, albuterol, loperamide, sodium chloride flush, sodium chloride, promethazine **OR** ondansetron, acetaminophen **OR** acetaminophen, albuterol sulfate HFA, LORazepam    Data:     Past Medical History:   has a past medical history of ASHLEIGH (acute [facility]) ([facility] Arthritis, Asthma, Bipolar 1 disorder ([facility] CHF (congestive heart failure) ([facility] COPD (chronic obstructive pulmonary disease) ([facility] Depression, Diabetes mellitus ([facility] Fibromyalgia, GERD (gastroesophageal reflux disease), Hyperlipidemia, Hypertension, and Pneumonia. Social History:   reports that she has been smoking. She has a 40.00 pack-year smoking history. She has never used smokeless tobacco. She reports that she does not drink alcohol and does not use drugs.      Family History:   Family History   Problem Relation Age of Onset    Heart Failure Mother     Cancer Sister     Cancer Maternal Grandmother     Heart Failure Maternal Grandmother        Vitals:  BP (!) 119/47   Pulse 80   Temp 98.4 °F (36.9 °C) (Oral)   Resp 14   Ht 4' 7.91\" (1.42 m)   Wt 178 lb 3.2 oz (80.8 kg)   SpO2 97%   BMI 40.09 kg/m²   Temp (24hrs), Av.1 °F (36.7 °C), Min:97.5 °F (36.4 °C), Max:98.6 °F (37 °C)    Recent Labs     21  1128 21  1730 21  0730   POCGLU 147* 175* 170* 200*       I/O (24Hr):     Intake/Output Summary (Last 24 hours) at 2021 0925  Last data filed at 2021 0651  Gross per 24 hour   Intake 2165 ml   Output 550 ml   Net 1615 ml       Labs:  Hematology:  Recent Labs     21  1300 21  1305 21  04321  0551   WBC  --  12.7* 14.1*  --   --   --    RBC  --  2.81* 3.23*  --   --   --    HGB  --  6.2* 8.0* 6.8* 7.7* 6.6*   HCT  --  23.1* 27.5* 22.9* 25.9* 21.9*   MCV  --  82.2* 85.1  --   --   --    MCH  --  22.1* 24.8*  --   --   --    MCHC  --  26.8* 29.1  --   --   --    RDW  --  16.7* 16.2*  --   --   --    PLT  --  376 341  --   --   --    MPV  --  10.0 9.9  --   --   --    INR 1.3  --   --   --   --   --      Chemistry:  Recent Labs     21  1305 21  0436 21  0551    139 138   K 4.3 3.7 3.9    107 109*   CO2 20 22 17*   GLUCOSE 193* 172* 245*   BUN 28* 24* 14   CREATININE 1.12* 1.28* 0.98*   ANIONGAP 10 10 12   LABGLOM 48* 41* 55*   GFRAA 58* 49* >60   CALCIUM 8.3* 8.1* 7.3*     Recent Labs     21  0709 21  1128 21  1730 21  0730   POCGLU 139* 147* 175* 170* 200*     ABG:  Lab Results   Component Value Date    POCPH 7.26 2016    POCPCO2 56 2016    POCPO2 80 2016    POCHCO3 25.3 2016    NBEA 2 2016    PBEA NOT REPORTED 2016    JVU8HYV 27 2016    RBLO2FRL 93 2016    FIO2 NOT REPORTED 2016     Lab Results   Component Value Date/Time    SPECIAL NOT REPORTED 2017 10:08 PM     Lab Results   Component Value Date/Time    CULTURE ESCHERICHIA COLI 50 to 100,000 CFU/ML (A) 2017 10:08 PM    CULTURE  2017 10:08 PM     [employer] [address] [facility] [place] [address] [phone]       Radiology:  CT ABDOMEN PELVIS W IV CONTRAST Additional Contrast? None    Result Date: 5/25/2021  Acute nonspecific pancolitis. Infectious or inflammatory colitis can be considered, less likely ischemic since the major visceral arteries appear patent. XR CHEST PORTABLE    Result Date: 5/26/2021  Subtle bibasilar infiltrates representing atelectasis versus pneumonia. Physical Examination:        Physical Exam  Constitutional:       General: She is in acute distress. Appearance: She is obese. She is ill-appearing. HENT:      Head: Normocephalic and atraumatic. Nose: Nose normal.      Mouth/Throat:      Mouth: Mucous membranes are dry. Eyes:      Extraocular Movements: Extraocular movements intact. Pupils: Pupils are equal, round, and reactive to light. Cardiovascular:      Rate and Rhythm: Normal rate. Pulses: Normal pulses. Heart sounds: Normal heart sounds. No murmur heard. Pulmonary:      Effort: Respiratory distress present. Breath sounds: Normal breath sounds. Abdominal:      General: Abdomen is flat. There is distension. Tenderness: There is abdominal tenderness. Musculoskeletal:         General: No swelling or tenderness. Normal range of motion. Cervical back: Normal range of motion and neck supple. Skin:     General: Skin is warm and dry. Capillary Refill: Capillary refill takes less than 2 seconds. Coloration: Skin is pale. Neurological:      General: No focal deficit present. Mental Status: She is alert.          Assessment:        Hospital Problems         Last Modified POA    * (Principal) GI (gastrointestinal bleed) 5/25/2021 Yes    Chronic obstructive pulmonary disease with acute exacerbation ([address] 5/25/2021 Yes    ASHLEIGH (acute kidney injury) ([address] 5/25/2021 Yes    Type 2 diabetes mellitus without complication, without long-term current use of insulin ([address] 5/25/2021 Yes    Bipolar 1 disorder ([facility] 5/25/2021 Yes    BRBPR (bright red blood per rectum) 5/25/2021 Yes    Acute colitis 5/25/2021 Yes    Anemia due to blood loss, acute 5/25/2021 Yes    CHF (congestive heart failure) ([facility] 5/25/2021 Yes    Pancolitis ([address] 5/26/2021 Yes          Plan:        Acute blood loss anemia secondary to GI bleed with bright red blood per rectum and acute colitis  Redraw stat H&H then continue every 6 hours - Transfuse for hemoglobin <7. Currently receiving her 5th unit. GI consultation, Scopes yesterday, diffuse colitis noted. IV Steroids initiated by GI for inflammatory bowel disease. Continue IV fluids -no indication for fluid volume overload at this time  Pain control for abdominal discomfort transition to Dilaudid secondary to itching post morphine administration  Type 2 diabetes -better controlled today  Scheduled long-acting insulin and sliding scale insulin for mealtime corrections, tight control will be needed due to steroid initiation. Januvia   Trulicity weekly if remaining in hospital at neck scheduled dose  COPD/asthma -stable  Symbicort twice daily  DuoNeb as needed wheezing  Education on the need for diet lifestyle modifications  Bipolar -stable  Wellbutrin, Cymbalta, Atarax, Nortriptyline  CHF -stable without respiratory distress, no indication for fluid volume overload  P.O.  Lasix as ordered         LONDON Hendrickson NP  5/27/2021  9:25 AM

## 2021-05-28 ENCOUNTER — APPOINTMENT (OUTPATIENT)
Dept: INTERVENTIONAL RADIOLOGY/VASCULAR | Age: 76
DRG: 385 | End: 2021-05-28
Payer: COMMERCIAL

## 2021-05-28 LAB
ABO/RH: NORMAL
ANION GAP SERPL CALCULATED.3IONS-SCNC: 7 MMOL/L (ref 9–17)
ANTIBODY SCREEN: NEGATIVE
ARM BAND NUMBER: NORMAL
BLD PROD TYP BPU: NORMAL
BUN BLDV-MCNC: 16 MG/DL (ref 8–23)
BUN/CREAT BLD: 12 (ref 9–20)
CALCIUM SERPL-MCNC: 7.2 MG/DL (ref 8.6–10.4)
CHLORIDE BLD-SCNC: 110 MMOL/L (ref 98–107)
CO2: 19 MMOL/L (ref 20–31)
CREAT SERPL-MCNC: 1.34 MG/DL (ref 0.5–0.9)
CROSSMATCH RESULT: NORMAL
DISPENSE STATUS BLOOD BANK: NORMAL
EXPIRATION DATE: NORMAL
GFR AFRICAN AMERICAN: 47 ML/MIN
GFR NON-AFRICAN AMERICAN: 39 ML/MIN
GFR SERPL CREATININE-BSD FRML MDRD: ABNORMAL ML/MIN/{1.73_M2}
GFR SERPL CREATININE-BSD FRML MDRD: ABNORMAL ML/MIN/{1.73_M2}
GLUCOSE BLD-MCNC: 122 MG/DL (ref 65–105)
GLUCOSE BLD-MCNC: 141 MG/DL (ref 70–99)
GLUCOSE BLD-MCNC: 144 MG/DL (ref 65–105)
GLUCOSE BLD-MCNC: 196 MG/DL (ref 65–105)
GLUCOSE BLD-MCNC: 260 MG/DL (ref 65–105)
HCT VFR BLD CALC: 21.6 % (ref 36.3–47.1)
HCT VFR BLD CALC: 23.4 % (ref 36.3–47.1)
HCT VFR BLD CALC: 23.7 % (ref 36.3–47.1)
HCT VFR BLD CALC: 25.1 % (ref 36.3–47.1)
HEMOGLOBIN: 6.6 G/DL (ref 11.9–15.1)
HEMOGLOBIN: 7.3 G/DL (ref 11.9–15.1)
HEMOGLOBIN: 7.3 G/DL (ref 11.9–15.1)
HEMOGLOBIN: 7.8 G/DL (ref 11.9–15.1)
POTASSIUM SERPL-SCNC: 3.8 MMOL/L (ref 3.7–5.3)
SODIUM BLD-SCNC: 136 MMOL/L (ref 135–144)
SURGICAL PATHOLOGY REPORT: NORMAL
TRANSFUSION STATUS: NORMAL
UNIT DIVISION: 0
UNIT NUMBER: NORMAL

## 2021-05-28 PROCEDURE — 6370000000 HC RX 637 (ALT 250 FOR IP): Performed by: NURSE PRACTITIONER

## 2021-05-28 PROCEDURE — 82947 ASSAY GLUCOSE BLOOD QUANT: CPT

## 2021-05-28 PROCEDURE — 36415 COLL VENOUS BLD VENIPUNCTURE: CPT

## 2021-05-28 PROCEDURE — 97535 SELF CARE MNGMENT TRAINING: CPT

## 2021-05-28 PROCEDURE — 36556 INSERT NON-TUNNEL CV CATH: CPT | Performed by: RADIOLOGY

## 2021-05-28 PROCEDURE — 85018 HEMOGLOBIN: CPT

## 2021-05-28 PROCEDURE — 6360000002 HC RX W HCPCS: Performed by: INTERNAL MEDICINE

## 2021-05-28 PROCEDURE — 2580000003 HC RX 258: Performed by: RADIOLOGY

## 2021-05-28 PROCEDURE — 97166 OT EVAL MOD COMPLEX 45 MIN: CPT

## 2021-05-28 PROCEDURE — 80048 BASIC METABOLIC PNL TOTAL CA: CPT

## 2021-05-28 PROCEDURE — 2709999900 IR INSERT PICC VAD W SQ PORT >5 YEARS

## 2021-05-28 PROCEDURE — 94761 N-INVAS EAR/PLS OXIMETRY MLT: CPT

## 2021-05-28 PROCEDURE — 6370000000 HC RX 637 (ALT 250 FOR IP): Performed by: INTERNAL MEDICINE

## 2021-05-28 PROCEDURE — 99232 SBSQ HOSP IP/OBS MODERATE 35: CPT | Performed by: INTERNAL MEDICINE

## 2021-05-28 PROCEDURE — 2700000000 HC OXYGEN THERAPY PER DAY

## 2021-05-28 PROCEDURE — 2580000003 HC RX 258: Performed by: INTERNAL MEDICINE

## 2021-05-28 PROCEDURE — 97530 THERAPEUTIC ACTIVITIES: CPT

## 2021-05-28 PROCEDURE — 86901 BLOOD TYPING SEROLOGIC RH(D): CPT

## 2021-05-28 PROCEDURE — 94640 AIRWAY INHALATION TREATMENT: CPT

## 2021-05-28 PROCEDURE — 86920 COMPATIBILITY TEST SPIN: CPT

## 2021-05-28 PROCEDURE — 77001 FLUOROGUIDE FOR VEIN DEVICE: CPT | Performed by: RADIOLOGY

## 2021-05-28 PROCEDURE — 76937 US GUIDE VASCULAR ACCESS: CPT | Performed by: RADIOLOGY

## 2021-05-28 PROCEDURE — 86900 BLOOD TYPING SEROLOGIC ABO: CPT

## 2021-05-28 PROCEDURE — 02HV33Z INSERTION OF INFUSION DEVICE INTO SUPERIOR VENA CAVA, PERCUTANEOUS APPROACH: ICD-10-PCS | Performed by: RADIOLOGY

## 2021-05-28 PROCEDURE — 85014 HEMATOCRIT: CPT

## 2021-05-28 PROCEDURE — 86850 RBC ANTIBODY SCREEN: CPT

## 2021-05-28 PROCEDURE — APPSS30 APP SPLIT SHARED TIME 16-30 MINUTES: Performed by: NURSE PRACTITIONER

## 2021-05-28 PROCEDURE — 1200000000 HC SEMI PRIVATE

## 2021-05-28 RX ORDER — SODIUM CHLORIDE 9 MG/ML
INJECTION, SOLUTION INTRAVENOUS PRN
Status: DISCONTINUED | OUTPATIENT
Start: 2021-05-28 | End: 2021-05-29 | Stop reason: SDUPTHER

## 2021-05-28 RX ORDER — CETIRIZINE HYDROCHLORIDE 10 MG/1
10 TABLET ORAL DAILY
Status: DISCONTINUED | OUTPATIENT
Start: 2021-05-28 | End: 2021-05-28

## 2021-05-28 RX ORDER — SODIUM CHLORIDE 0.9 % (FLUSH) 0.9 %
10 SYRINGE (ML) INJECTION PRN
Status: DISCONTINUED | OUTPATIENT
Start: 2021-05-28 | End: 2021-06-05 | Stop reason: HOSPADM

## 2021-05-28 RX ORDER — MONTELUKAST SODIUM 10 MG/1
10 TABLET ORAL NIGHTLY
Status: DISCONTINUED | OUTPATIENT
Start: 2021-05-28 | End: 2021-06-05 | Stop reason: HOSPADM

## 2021-05-28 RX ORDER — FUROSEMIDE 40 MG/1
40 TABLET ORAL 2 TIMES DAILY
Status: DISCONTINUED | OUTPATIENT
Start: 2021-05-28 | End: 2021-06-05 | Stop reason: HOSPADM

## 2021-05-28 RX ORDER — SODIUM CHLORIDE 0.9 % (FLUSH) 0.9 %
10 SYRINGE (ML) INJECTION EVERY 12 HOURS SCHEDULED
Status: DISCONTINUED | OUTPATIENT
Start: 2021-05-28 | End: 2021-06-05 | Stop reason: HOSPADM

## 2021-05-28 RX ADMIN — PANTOPRAZOLE SODIUM 40 MG: 40 TABLET, DELAYED RELEASE ORAL at 11:22

## 2021-05-28 RX ADMIN — TIZANIDINE 2 MG: 2 TABLET ORAL at 21:46

## 2021-05-28 RX ADMIN — ATORVASTATIN CALCIUM 40 MG: 40 TABLET, FILM COATED ORAL at 21:48

## 2021-05-28 RX ADMIN — SODIUM CHLORIDE: 9 INJECTION, SOLUTION INTRAVENOUS at 02:00

## 2021-05-28 RX ADMIN — INSULIN LISPRO 1 UNITS: 100 INJECTION, SOLUTION INTRAVENOUS; SUBCUTANEOUS at 21:51

## 2021-05-28 RX ADMIN — ALBUTEROL SULFATE 2.5 MG: 2.5 SOLUTION RESPIRATORY (INHALATION) at 14:55

## 2021-05-28 RX ADMIN — Medication 2000 UNITS: at 17:27

## 2021-05-28 RX ADMIN — INSULIN LISPRO 6 UNITS: 100 INJECTION, SOLUTION INTRAVENOUS; SUBCUTANEOUS at 17:24

## 2021-05-28 RX ADMIN — HYDROXYZINE HYDROCHLORIDE 25 MG: 25 TABLET, FILM COATED ORAL at 08:51

## 2021-05-28 RX ADMIN — TIOTROPIUM BROMIDE INHALATION SPRAY 2 PUFF: 3.12 SPRAY, METERED RESPIRATORY (INHALATION) at 11:11

## 2021-05-28 RX ADMIN — NORTRIPTYLINE HYDROCHLORIDE 10 MG: 10 CAPSULE ORAL at 21:47

## 2021-05-28 RX ADMIN — FUROSEMIDE 20 MG: 20 TABLET ORAL at 08:49

## 2021-05-28 RX ADMIN — METHYLPREDNISOLONE SODIUM SUCCINATE 40 MG: 40 INJECTION, POWDER, FOR SOLUTION INTRAMUSCULAR; INTRAVENOUS at 08:46

## 2021-05-28 RX ADMIN — ALBUTEROL SULFATE 2.5 MG: 2.5 SOLUTION RESPIRATORY (INHALATION) at 11:11

## 2021-05-28 RX ADMIN — SODIUM CHLORIDE, PRESERVATIVE FREE 10 ML: 5 INJECTION INTRAVENOUS at 21:48

## 2021-05-28 RX ADMIN — DULOXETINE HYDROCHLORIDE 90 MG: 60 CAPSULE, DELAYED RELEASE ORAL at 08:49

## 2021-05-28 RX ADMIN — Medication 1 MG: at 14:42

## 2021-05-28 RX ADMIN — DICLOFENAC SODIUM 2 G: 10 GEL TOPICAL at 23:18

## 2021-05-28 RX ADMIN — MONTELUKAST 10 MG: 10 TABLET, FILM COATED ORAL at 21:47

## 2021-05-28 RX ADMIN — BUPROPION HYDROCHLORIDE 150 MG: 150 TABLET, EXTENDED RELEASE ORAL at 08:49

## 2021-05-28 RX ADMIN — SODIUM CHLORIDE, PRESERVATIVE FREE 10 ML: 5 INJECTION INTRAVENOUS at 10:34

## 2021-05-28 RX ADMIN — BUDESONIDE AND FORMOTEROL FUMARATE DIHYDRATE 1 PUFF: 160; 4.5 AEROSOL RESPIRATORY (INHALATION) at 20:21

## 2021-05-28 RX ADMIN — SODIUM CHLORIDE, PRESERVATIVE FREE 10 ML: 5 INJECTION INTRAVENOUS at 17:30

## 2021-05-28 RX ADMIN — SODIUM CHLORIDE, PRESERVATIVE FREE 10 ML: 5 INJECTION INTRAVENOUS at 17:32

## 2021-05-28 RX ADMIN — LORAZEPAM 0.25 MG: 0.5 TABLET ORAL at 21:47

## 2021-05-28 RX ADMIN — ALBUTEROL SULFATE 2.5 MG: 2.5 SOLUTION RESPIRATORY (INHALATION) at 20:22

## 2021-05-28 RX ADMIN — HYDROXYZINE HYDROCHLORIDE 25 MG: 25 TABLET, FILM COATED ORAL at 21:48

## 2021-05-28 RX ADMIN — ONDANSETRON 4 MG: 2 INJECTION INTRAMUSCULAR; INTRAVENOUS at 21:47

## 2021-05-28 RX ADMIN — FERROUS SULFATE TAB EC 325 MG (65 MG FE EQUIVALENT) 325 MG: 325 (65 FE) TABLET DELAYED RESPONSE at 17:27

## 2021-05-28 RX ADMIN — MAGNESIUM GLUCONATE 500 MG ORAL TABLET 400 MG: 500 TABLET ORAL at 08:49

## 2021-05-28 RX ADMIN — FUROSEMIDE 40 MG: 40 TABLET ORAL at 21:47

## 2021-05-28 RX ADMIN — FLUTICASONE PROPIONATE 1 SPRAY: 50 SPRAY, METERED NASAL at 11:00

## 2021-05-28 RX ADMIN — MIRTAZAPINE 15 MG: 15 TABLET, FILM COATED ORAL at 21:47

## 2021-05-28 RX ADMIN — BUDESONIDE AND FORMOTEROL FUMARATE DIHYDRATE 1 PUFF: 160; 4.5 AEROSOL RESPIRATORY (INHALATION) at 11:11

## 2021-05-28 RX ADMIN — MAGNESIUM GLUCONATE 500 MG ORAL TABLET 400 MG: 500 TABLET ORAL at 21:47

## 2021-05-28 RX ADMIN — Medication 1 MG: at 08:50

## 2021-05-28 RX ADMIN — INSULIN LISPRO 2 UNITS: 100 INJECTION, SOLUTION INTRAVENOUS; SUBCUTANEOUS at 14:31

## 2021-05-28 RX ADMIN — Medication 1 MG: at 19:44

## 2021-05-28 RX ADMIN — Medication 1 MG: at 02:00

## 2021-05-28 ASSESSMENT — PAIN DESCRIPTION - DESCRIPTORS
DESCRIPTORS: CRAMPING
DESCRIPTORS: ACHING
DESCRIPTORS: ACHING

## 2021-05-28 ASSESSMENT — PAIN DESCRIPTION - FREQUENCY
FREQUENCY: CONTINUOUS

## 2021-05-28 ASSESSMENT — PAIN DESCRIPTION - PAIN TYPE
TYPE: CHRONIC PAIN
TYPE: ACUTE PAIN
TYPE: CHRONIC PAIN

## 2021-05-28 ASSESSMENT — PAIN SCALES - GENERAL
PAINLEVEL_OUTOF10: 4
PAINLEVEL_OUTOF10: 9
PAINLEVEL_OUTOF10: 9
PAINLEVEL_OUTOF10: 3
PAINLEVEL_OUTOF10: 4
PAINLEVEL_OUTOF10: 9
PAINLEVEL_OUTOF10: 8
PAINLEVEL_OUTOF10: 8
PAINLEVEL_OUTOF10: 9
PAINLEVEL_OUTOF10: 5
PAINLEVEL_OUTOF10: 0

## 2021-05-28 ASSESSMENT — PAIN DESCRIPTION - ONSET
ONSET: ON-GOING
ONSET: PROGRESSIVE
ONSET: ON-GOING

## 2021-05-28 ASSESSMENT — PAIN DESCRIPTION - PROGRESSION
CLINICAL_PROGRESSION: GRADUALLY IMPROVING
CLINICAL_PROGRESSION: NOT CHANGED
CLINICAL_PROGRESSION: GRADUALLY IMPROVING
CLINICAL_PROGRESSION: NOT CHANGED
CLINICAL_PROGRESSION: NOT CHANGED

## 2021-05-28 ASSESSMENT — PAIN DESCRIPTION - LOCATION
LOCATION: OTHER (COMMENT)
LOCATION: OTHER (COMMENT)
LOCATION: ABDOMEN

## 2021-05-28 ASSESSMENT — PAIN DESCRIPTION - ORIENTATION: ORIENTATION: ANTERIOR

## 2021-05-28 ASSESSMENT — PAIN - FUNCTIONAL ASSESSMENT
PAIN_FUNCTIONAL_ASSESSMENT: PREVENTS OR INTERFERES SOME ACTIVE ACTIVITIES AND ADLS

## 2021-05-28 NOTE — BRIEF OP NOTE
Brief Postoperative Note    Pattie Edwards  YOB: 1945  [id number]    Pre-operative Diagnosis: GI bleed    Post-operative Diagnosis: Same    Procedure: PICC placement    Anesthesia: Local    Surgeons/Assistants: Bret    Estimated Blood Loss: less than 50     Complications: None    Specimens: Was Not Obtained    Electronically signed by Nargis Ren MD on 5/28/2021 at 10:09 AM

## 2021-05-28 NOTE — PROGRESS NOTES
Occupational Therapy   Occupational Therapy Initial Assessment  Date: 2021   Patient Name: Kelley Valencia  MRN: [id number]     : 1945    RN SYBIL reports patient is medically stable for therapy treatment this date. Chart reviewed prior to treatment and patient is agreeable for therapy. All lines intact and patient positioned comfortably at end of treatment. All patient needs addressed prior to ending therapy session. Date of Service: 2021    Discharge Recommendations:  Patient would benefit from continued therapy after discharge   Due to recent hospitalization and medical condition, pt would benefit from additional therapy at time of discharge to ensure safety. Please refer to the AM-PAC score for current functional status. OT Equipment Recommendations  Equipment Needed: Yes  Mobility Devices: ADL Assistive Devices  ADL Assistive Devices: Emergency Alert System;Long-handled [place];Long-handled Sponge;Reacher;Sock-Aid Soft    Assessment   Performance deficits / Impairments: Decreased functional mobility ; Decreased safe awareness;Decreased ADL status; Decreased strength;Decreased ROM; Decreased endurance;Decreased sensation;Decreased fine motor control;Decreased high-level IADLs;Decreased posture;Decreased cognition;Decreased balance  Assessment: Skilled OT services are indicated to increase I and safety during functional tasks to return home at prior level of function as able.   Prognosis: Fair  Decision Making: Medium Complexity  OT Education: OT Role;Transfer Training;Energy Conservation;Plan of Care;ADL Adaptive Strategies  Patient Education: safety in function, fall prevention/call light use, recommendations for continued therapy, benefits of being OOB  REQUIRES OT FOLLOW UP: Yes  Activity Tolerance  Activity Tolerance: Patient Tolerated treatment well;Treatment limited secondary to agitation  Activity Tolerance: Poor +  Safety Devices  Safety Devices in place: Yes  Type of devices: assistance  Stand to sit: Minimal assistance  Transfer Comments: Pt required Min verbal cues/tactile assist for RW safety, controlled stand to sit, pacing self, slowing down movements, upright posture, awareness/assist with lines all to increase safety and reduce risk of fall. Cognition  Overall Cognitive Status: Exceptions  Arousal/Alertness: Appropriate responses to stimuli  Following Commands: Follows multistep commands with repitition; Follows multistep commands with increased time  Attention Span: Appears intact  Memory: Appears intact  Safety Judgement: Decreased awareness of need for safety  Problem Solving: Decreased awareness of errors;Assistance required to identify errors made;Assistance required to correct errors made  Insights: Decreased awareness of deficits  Initiation: Does not require cues  Sequencing: Requires cues for some  Perception  Overall Perceptual Status: WFL     Sensation  Overall Sensation Status: Impaired (Pt states she has neuropathy, constant numbness and tingling and R leg and B feet, intermittent numbness and tingling in B hands)        LUE AROM (degrees)  LUE AROM : Exceptions  LUE General AROM: shld flexion ~ 30 degrees, Rest WFL  RUE AROM (degrees)  RUE AROM : Exceptions  RUE General AROM: shld flexion ~ 70 degrees, Rest WFL  LUE Strength  Gross LUE Strength: Exceptions to [facility]  LUE Strength Comment: shld 3-/5, Rest 4-/5  RUE Strength  Gross RUE Strength: Exceptions to [facility]  RUE Strength Comment: shld 3/5, Rest 4-/5           Plan   Plan  Times per week: 4-5x/wk 1x/day as rosa  Current Treatment Recommendations: Strengthening, Endurance Training, Balance Training, Functional Mobility Training, Safety Education & Training, Pain Management, Home Management Training, Self-Care / ADL, Equipment Evaluation, Education, & procurement, Patient/Caregiver Education & Training      AM-PAC Score        AM-PAC Inpatient Daily Activity Raw Score: 17 (05/28/21 7017)  AM-PAC Inpatient ADL T-Scale Score : 37.26 (05/28/21 1317)  ADL Inpatient CMS 0-100% Score: 50.11 (05/28/21 1317)  ADL Inpatient CMS G-Code Modifier : CK (05/28/21 1317)    Goals  Short term goals  Time Frame for Short term goals: By discharge, pt to demo  Short term goal 1: bed mobility to Mod I with use of bedrails as needed. Short term goal 2: toileting to SBA with use of grab bars/AD as needed. Short term goal 3: UB ADLs to Set up and LB ADLs to Min A with use of AD/AE as needed. Short term goal 4: increased B UE strength by 1/2 grade/increased B shld AROM by 10-15 degrees to assist with self care. Short term goal 5: ADL transfers and functional mobility to Mod I with use of AD as needed. Long term goals  Long term goal 1: Pt to demo increase standing rosa > 8 min with Min A using AD as needed to reduce fall risk during functional tasks. Long term goal 2: Pt to be I with fall prevention education, EC/WS tech, and recommendations for AE with use of handouts as needed. Patient Goals   Patient goals : To go home!        Therapy Time   Individual Concurrent Group Co-treatment   Time In 0800         Time Out 0840         Minutes 40          tx time: 27 min          Talisha Vernon OT

## 2021-05-28 NOTE — PROGRESS NOTES
[facility]  Office: [address], DO, Ginger Keene, DO, Keyshawn Phillips, DO, Jairo Birch Blood, DO, Ariane Pardo MD, Susan Tellez MD, Earl Francisco MD, Elliott Garibay MD, Polly Crespo MD, Rashaun Cervantes MD, Jennifer Taylor MD, Kelsey Head MD, Madina Curiel, DO, Christine Beckford MD, Ely Ac, DO, Ciro Lara MD,  Luna Echavarria DO, Vel Junior MD, Charles Miller MD, Sienna Pearl MD, Dez Musa MD, Vinny Ding, [facility], [facility], CNP, Zhang Gallegos, CNP, Elvira Eller, CNS, Lorrie Radford, CNP, Maine Guallpa, CNP, Milli Rey, CNP, Osiel Camacho, CNP, Carlos Panchal, CNP, Gisela Travis PA-C, Karlos Christian, [facility], Jacob Demarco, CNP, Kalyani Liao, CNP, Lisa Sierra, CNP, Jacoby Parra, CNP, Mary Almendarez, CNP, Nahomi Kate, [facility]    Progress Note    5/28/2021    3:09 PM    Name:   Nilda Harrison  MRN:     [id number]     Elmer Causey:      [de-identified]   Room:   [address] [place], [place] [id number] [facility] Day:  3  Admit Date:  5/25/2021 12:33 PM    PCP:   Karin Still DO  Code Status:  Full Code    Subjective:     C/C:   Chief Complaint   Patient presents with    GI Bleeding     Interval History Status: not changed. Pt feels like her stool is now mixed with brown and blood, maybe old blood, very loose. No abdominal pain, Nausea. Hb 7.3. Brief History:     Per the NP:  \"5/25 -patient has a history of A. fib on Eliquis. Over the past 3 days patient has noted bright red blood per rectum.  Patient reports that during that same timeLeonor Arroyo has began experiencing worsening fatigue and shortness of breath.  Patient is now to the point where she has severe shortness of breath with minimal exertion and severe dizziness and weakness while rising.  Patient continues to experience dark red stools mixed with bright red blood and liquid diarrhea.  CT of the abdomen shows pancolitis.  GI consulted.      5/26 -patient received blood transfusions. Reportedly going for EGD and colonoscopy today. Stool culture is pending.     5/27 - Little change. Steroids started per GI following scopes. Inflammatory bowel disease with diffuse colitis noted in GI note. Still passing blood and has now required 5 units of blood. GI completed EGD and colonoscopy yesterday and showed diffuse colitis. Patient continues to endorse abdominal discomfort. Steroids started per GI following scopes. Inflammatory bowel disease with diffuse colitis noted in GI note. Still passing blood and has now required 5 units of blood. \"    Review of Systems:     Constitutional:  negative for chills, fevers, sweats  Respiratory:  negative for cough, dyspnea on exertion, shortness of breath, wheezing  Cardiovascular:  negative for chest pain, chest pressure/discomfort, lower extremity edema, palpitations  Gastrointestinal:  negative for abdominal pain, constipation, nausea, vomiting, + hematochezia  Neurological:  negative for dizziness, headache    Medications: Allergies:     Allergies   Allergen Reactions    Aspirin     Ibuprofen     Sulfa Antibiotics     Tape Lajune Gitelman Tape]     Lyrica [Pregabalin] Other (See Comments)     \"made her goofy\"       Current Meds:   Scheduled Meds:    sodium chloride flush  10 mL Intravenous 2 times per day    insulin lispro  0-12 Units Subcutaneous TID WC    insulin lispro  0-6 Units Subcutaneous Nightly    insulin glargine  10 Units Subcutaneous BID    sodium chloride flush  5-40 mL Intravenous Q12H    methylPREDNISolone  40 mg Intravenous Daily    atorvastatin  40 mg Oral Nightly    budesonide-formoterol  1 puff Inhalation BID    Vitamin D  2,000 Units Oral Dinner    ferrous sulfate  325 mg Oral Dinner    fluticasone  1 spray Nasal Daily    furosemide  20 mg Oral Daily    docusate sodium  100 mg Oral Nightly    hydrOXYzine  25 mg Oral BID    magnesium oxide  400 mg Oral BID    mirtazapine  15 mg Oral Nightly    Summary (Last 24 hours) at 5/28/2021 1509  Last data filed at 5/28/2021 0801  Gross per 24 hour   Intake 3789 ml   Output 750 ml   Net 3039 ml       Labs:  Hematology:  Recent Labs     05/26/21  0436 05/28/21  0225 05/28/21  0855 05/28/21  1454   WBC 14.1*  --   --   --    RBC 3.23*  --   --   --    HGB 8.0* 7.3* 7.8* 7.3*   HCT 27.5* 23.7* 25.1* 23.4*   MCV 85.1  --   --   --    MCH 24.8*  --   --   --    MCHC 29.1  --   --   --    RDW 16.2*  --   --   --      --   --   --    MPV 9.9  --   --   --      Chemistry:  Recent Labs     05/26/21  0436 05/27/21  0551 05/28/21  0546    138 136   K 3.7 3.9 3.8    109* 110*   CO2 22 17* 19*   GLUCOSE 172* 245* 141*   BUN 24* 14 16   CREATININE 1.28* 0.98* 1.34*   ANIONGAP 10 12 7*   LABGLOM 41* 55* 39*   GFRAA 49* >60 47*   CALCIUM 8.1* 7.3* 7.2*     Recent Labs     05/27/21  0730 05/27/21  1206 05/27/21  1700 05/27/21  1931 05/28/21  0708 05/28/21  1222   POCGLU 200* 185* 234* 275* 122* 196*     ABG:  Lab Results   Component Value Date    POCPH 7.26 12/31/2016    POCPCO2 56 12/31/2016    POCPO2 80 12/31/2016    POCHCO3 25.3 12/31/2016    NBEA 2 12/31/2016    PBEA NOT REPORTED 12/31/2016    FAC2ZVK 27 12/31/2016    VXDQ3JBE 93 12/31/2016    FIO2 NOT REPORTED 12/31/2016     Lab Results   Component Value Date/Time    SPECIAL NOT REPORTED 06/26/2017 10:08 PM     Lab Results   Component Value Date/Time    CULTURE ESCHERICHIA COLI 50 to 100,000 CFU/ML (A) 06/26/2017 10:08 PM    CULTURE  06/26/2017 10:08 PM     [facility] [address] [facility], [address] [place] [address] [phone]       Radiology:  CT ABDOMEN PELVIS W IV CONTRAST Additional Contrast? None    Result Date: 5/25/2021  Acute nonspecific pancolitis. Infectious or inflammatory colitis can be considered, less likely ischemic since the major visceral arteries appear patent. XR CHEST PORTABLE    Result Date: 5/26/2021  Subtle bibasilar infiltrates representing atelectasis versus pneumonia.      IR INSERT PICC VAD W SQ PORT >5 YEARS    Result Date: 5/28/2021  Successful ultrasound and fluoroscopy guided right basilic vein 5 French power injectable dual-lumen PICC placement. Ready for use. Physical Examination:        General appearance:  alert, cooperative and no distress  Mental Status:  oriented to person, place and time and normal affect  Lungs:  + crackles bilaterally, reduced BS, normal effort  Heart:  regular rate and rhythm, no murmur  Abdomen:  soft, nontender, nondistended, normal bowel sounds, no masses, hepatomegaly, splenomegaly  Extremities:  +1 pedal edema, no redness, tenderness in the calves  Skin:  no gross lesions, rashes, induration    Assessment:        Hospital Problems         Last Modified POA    * (Principal) GI (gastrointestinal bleed) 5/25/2021 Yes    Chronic obstructive pulmonary disease with acute exacerbation ([address] 5/25/2021 Yes    ASHLEIGH (acute kidney injury) ([address] 5/25/2021 Yes    Type 2 diabetes mellitus without complication, without long-term current use of insulin ([address] 5/25/2021 Yes    Bipolar 1 disorder ([address] 5/25/2021 Yes    BRBPR (bright red blood per rectum) 5/25/2021 Yes    Acute colitis 5/25/2021 Yes    Anemia due to blood loss, acute 5/25/2021 Yes    CHF (congestive heart failure) ([address] 5/25/2021 Yes    Pancolitis ([address] 5/26/2021 Yes          Plan:        Acute blood loss anemia secondary to GI bleed-hemoglobin 7.3, transfuse as needed, monitor H/H. Found to have diffuse colitis, IV steroids initiated by GI for inflammatory bowel disease.   Patient has received 5 units so far  DM2-hold Nesina, continue Lantus, SSI  COPD/asthma-patient asking for breathing treatment, continue Symbicort, start Singulair, start Zyrtec  CHF-concern for volume overload, stop IV fluids, increase Lasix 40 mg p.o. twice daily, monitor output  Bipolar disorder-stable  Atrial fibrillation-heart rate controlled, GI recommends starting heparin drip while in the hospital onset of Eliquis  GI

## 2021-05-28 NOTE — PROGRESS NOTES
Blood consent was confirmed   Blood was dual signed per writer and an [place] RN,   Blood touched vein @ 2220  Vitals stable. Vitals were taken within 30 minutes prior to blood administration and 15 minutes after start    Patient was observed for first 15 minutes per writer  No reaction noted.  Will continue to monitor

## 2021-05-28 NOTE — PLAN OF CARE
improve  Description: Ability to maintain appropriate glucose levels will improve  Outcome: Ongoing     Problem: Sensory Perception - Impaired:  Goal: Ability to maintain a stable neurologic state will improve  Description: Ability to maintain a stable neurologic state will improve  Outcome: Ongoing

## 2021-05-28 NOTE — PROGRESS NOTES
movements intact, conjunctivae normal  ENT: hearing grossly normal bilaterally  Neck: neck supple and non tender without mass, no thyromegaly or thyroid nodules, no cervical lymphadenopathy   Pulmonary/Chest: clear to auscultation bilaterally- no wheezes, rales or rhonchi, normal air movement, no respiratory distress  Cardiovascular: normal rate, regular rhythm, normal S1 and S2, no murmurs, rubs, clicks or gallops, distal pulses intact, no carotid bruits  Abdomen: soft, non-tender, obese non-distended, normal bowel sounds, no masses or organomegaly  Extremities: no cyanosis, clubbing or edema  Musculoskeletal: normal range of motion, no joint swelling, deformity or tenderness  Neurologic: no cranial nerve deficit and muscle strength normal    Lab and Imaging Review     CBC  Recent Labs     05/25/21  1305 05/26/21  0436 05/27/21  2116 05/28/21  0225 05/28/21  0855   WBC 12.7* 14.1*  --   --   --    HGB 6.2* 8.0* 6.5* 7.3* 7.8*   HCT 23.1* 27.5* 21.2* 23.7* 25.1*   MCV 82.2* 85.1  --   --   --     341  --   --   --        BMP  Recent Labs     05/26/21  0436 05/27/21  0551 05/28/21  0546    138 136   K 3.7 3.9 3.8    109* 110*   CO2 22 17* 19*   BUN 24* 14 16   CREATININE 1.28* 0.98* 1.34*   GLUCOSE 172* 245* 141*   CALCIUM 8.1* 7.3* 7.2*       PT/INR  Recent Labs     05/25/21  1300   PROTIME 16.1*   INR 1.3         ANEMIA STUDIES  Recent Labs     05/26/21  0436   LABIRON 26   TIBC 330     colonoscopy dr Erwin Guzman 5/26/21  FINDINGS: Rectal examination demonstrated no significant visible external abnormality and digital palpation was unremarkable. Following adequate conscious sedation the colonoscope was introduced and advanced under direct visualization to the ascending colon. The bowel preparation was felt to be fair. This included moderate amounts of bloody, thick stool that was not able to be adequately irrigated and aspirated.  Cecal intubation time was 4 minutes.      Once maximally inserted, the endoscope was withdrawn and the mucosa was carefully inspected. The mucosal exam showed mild diffuse colitis throughout the colon.  Mucosal oozing was noted in multiple areas.  No discrete mass.  Prep was fair thus evaluation was limited.      FINDINGS: egd dr Eli Singh 5/26/21  Esophagus: The esophagus was inspected to the Z-line. The endoscopic exam showed no pathology.      Stomach: The stomach was inspected in both forward and retroflex fashion and was appropriately distensible. The cardia, fundus, incisura, antrum and pylorus were identified via direct visualization. The endoscopic exam showed mild erythema in distal stomach.  No bleeding.      Duodenum: The proximal small bowel was inspected through the bulb, sweep, and second portion of the duodenum. The endoscopic exam showed no pathology.        ASSESSMENT/plan  1. 1.Anemia with rectal bleeding, likely ulcerative colitis s/p egd showing gastritis and colonoscopy showing blood and mild diffuse colitis, improved  -continue the IV steroid and will need a oral taper of steroids at discharge, to f/u as outpt for biopsy results  -trend hh and keep hgb >7  -antiemetics and pain meds prn  -will advance to soft diet  -continue ppi for gastritis  -continue iron replacement  -discussed anticoagulation with md recommend starting heparin drip if needed and holding the eliquis for now. Primary RN notified. This plan was formulated in collaboration with Dr. Eli Singh.     Electronically signed by: LONDON Alvarez CNP on 5/28/2021 at 12:33 PM

## 2021-05-29 ENCOUNTER — APPOINTMENT (OUTPATIENT)
Dept: GENERAL RADIOLOGY | Age: 76
DRG: 385 | End: 2021-05-29
Payer: COMMERCIAL

## 2021-05-29 LAB
ANION GAP SERPL CALCULATED.3IONS-SCNC: 9 MMOL/L (ref 9–17)
BUN BLDV-MCNC: 16 MG/DL (ref 8–23)
BUN/CREAT BLD: 12 (ref 9–20)
CALCIUM SERPL-MCNC: 7.5 MG/DL (ref 8.6–10.4)
CHLORIDE BLD-SCNC: 110 MMOL/L (ref 98–107)
CO2: 20 MMOL/L (ref 20–31)
CREAT SERPL-MCNC: 1.34 MG/DL (ref 0.5–0.9)
GFR AFRICAN AMERICAN: 47 ML/MIN
GFR NON-AFRICAN AMERICAN: 39 ML/MIN
GFR SERPL CREATININE-BSD FRML MDRD: ABNORMAL ML/MIN/{1.73_M2}
GFR SERPL CREATININE-BSD FRML MDRD: ABNORMAL ML/MIN/{1.73_M2}
GLUCOSE BLD-MCNC: 138 MG/DL (ref 65–105)
GLUCOSE BLD-MCNC: 179 MG/DL (ref 70–99)
GLUCOSE BLD-MCNC: 224 MG/DL (ref 65–105)
GLUCOSE BLD-MCNC: 239 MG/DL (ref 65–105)
GLUCOSE BLD-MCNC: 299 MG/DL (ref 65–105)
HCT VFR BLD CALC: 20.2 % (ref 36.3–47.1)
HCT VFR BLD CALC: 21.8 % (ref 36.3–47.1)
HCT VFR BLD CALC: 25.6 % (ref 36.3–47.1)
HCT VFR BLD CALC: 27.1 % (ref 36.3–47.1)
HEMOGLOBIN: 6.2 G/DL (ref 11.9–15.1)
HEMOGLOBIN: 6.8 G/DL (ref 11.9–15.1)
HEMOGLOBIN: 7.9 G/DL (ref 11.9–15.1)
HEMOGLOBIN: 8 G/DL (ref 11.9–15.1)
MCH RBC QN AUTO: 27.9 PG (ref 25.2–33.5)
MCHC RBC AUTO-ENTMCNC: 30.9 G/DL (ref 28.4–34.8)
MCV RBC AUTO: 90.5 FL (ref 82.6–102.9)
NRBC AUTOMATED: 0.3 PER 100 WBC
PDW BLD-RTO: 17.2 % (ref 11.8–14.4)
PLATELET # BLD: 255 K/UL (ref 138–453)
PMV BLD AUTO: 10.2 FL (ref 8.1–13.5)
POTASSIUM SERPL-SCNC: 3.7 MMOL/L (ref 3.7–5.3)
RBC # BLD: 2.83 M/UL (ref 3.95–5.11)
SODIUM BLD-SCNC: 139 MMOL/L (ref 135–144)
WBC # BLD: 15.3 K/UL (ref 3.5–11.3)

## 2021-05-29 PROCEDURE — 6360000002 HC RX W HCPCS: Performed by: INTERNAL MEDICINE

## 2021-05-29 PROCEDURE — 85018 HEMOGLOBIN: CPT

## 2021-05-29 PROCEDURE — 94640 AIRWAY INHALATION TREATMENT: CPT

## 2021-05-29 PROCEDURE — 36430 TRANSFUSION BLD/BLD COMPNT: CPT

## 2021-05-29 PROCEDURE — 85014 HEMATOCRIT: CPT

## 2021-05-29 PROCEDURE — 6370000000 HC RX 637 (ALT 250 FOR IP): Performed by: INTERNAL MEDICINE

## 2021-05-29 PROCEDURE — 82947 ASSAY GLUCOSE BLOOD QUANT: CPT

## 2021-05-29 PROCEDURE — 1200000000 HC SEMI PRIVATE

## 2021-05-29 PROCEDURE — 86900 BLOOD TYPING SEROLOGIC ABO: CPT

## 2021-05-29 PROCEDURE — 2580000003 HC RX 258: Performed by: RADIOLOGY

## 2021-05-29 PROCEDURE — 36415 COLL VENOUS BLD VENIPUNCTURE: CPT

## 2021-05-29 PROCEDURE — 85027 COMPLETE CBC AUTOMATED: CPT

## 2021-05-29 PROCEDURE — 2700000000 HC OXYGEN THERAPY PER DAY

## 2021-05-29 PROCEDURE — 2580000003 HC RX 258: Performed by: NURSE PRACTITIONER

## 2021-05-29 PROCEDURE — 97162 PT EVAL MOD COMPLEX 30 MIN: CPT

## 2021-05-29 PROCEDURE — 6370000000 HC RX 637 (ALT 250 FOR IP): Performed by: NURSE PRACTITIONER

## 2021-05-29 PROCEDURE — 99233 SBSQ HOSP IP/OBS HIGH 50: CPT | Performed by: INTERNAL MEDICINE

## 2021-05-29 PROCEDURE — 71045 X-RAY EXAM CHEST 1 VIEW: CPT

## 2021-05-29 PROCEDURE — P9016 RBC LEUKOCYTES REDUCED: HCPCS

## 2021-05-29 PROCEDURE — 99232 SBSQ HOSP IP/OBS MODERATE 35: CPT | Performed by: INTERNAL MEDICINE

## 2021-05-29 PROCEDURE — 80048 BASIC METABOLIC PNL TOTAL CA: CPT

## 2021-05-29 RX ORDER — SODIUM CHLORIDE 9 MG/ML
INJECTION, SOLUTION INTRAVENOUS PRN
Status: DISCONTINUED | OUTPATIENT
Start: 2021-05-29 | End: 2021-05-30 | Stop reason: SDUPTHER

## 2021-05-29 RX ORDER — ALBUTEROL SULFATE 2.5 MG/3ML
2.5 SOLUTION RESPIRATORY (INHALATION) 4 TIMES DAILY
Status: DISCONTINUED | OUTPATIENT
Start: 2021-05-29 | End: 2021-06-05 | Stop reason: HOSPADM

## 2021-05-29 RX ORDER — FUROSEMIDE 10 MG/ML
20 INJECTION INTRAMUSCULAR; INTRAVENOUS ONCE
Status: COMPLETED | OUTPATIENT
Start: 2021-05-29 | End: 2021-05-29

## 2021-05-29 RX ORDER — METHYLPREDNISOLONE SODIUM SUCCINATE 40 MG/ML
40 INJECTION, POWDER, LYOPHILIZED, FOR SOLUTION INTRAMUSCULAR; INTRAVENOUS EVERY 8 HOURS
Status: DISCONTINUED | OUTPATIENT
Start: 2021-05-29 | End: 2021-06-05 | Stop reason: HOSPADM

## 2021-05-29 RX ADMIN — Medication 1 MG: at 22:19

## 2021-05-29 RX ADMIN — INSULIN LISPRO 2 UNITS: 100 INJECTION, SOLUTION INTRAVENOUS; SUBCUTANEOUS at 22:22

## 2021-05-29 RX ADMIN — ATORVASTATIN CALCIUM 40 MG: 40 TABLET, FILM COATED ORAL at 22:22

## 2021-05-29 RX ADMIN — LORAZEPAM 0.25 MG: 0.5 TABLET ORAL at 22:26

## 2021-05-29 RX ADMIN — TIZANIDINE 2 MG: 2 TABLET ORAL at 08:10

## 2021-05-29 RX ADMIN — Medication 1 MG: at 04:02

## 2021-05-29 RX ADMIN — PANTOPRAZOLE SODIUM 40 MG: 40 TABLET, DELAYED RELEASE ORAL at 06:15

## 2021-05-29 RX ADMIN — SODIUM CHLORIDE, PRESERVATIVE FREE 20 ML: 5 INJECTION INTRAVENOUS at 10:23

## 2021-05-29 RX ADMIN — BUDESONIDE AND FORMOTEROL FUMARATE DIHYDRATE 1 PUFF: 160; 4.5 AEROSOL RESPIRATORY (INHALATION) at 09:18

## 2021-05-29 RX ADMIN — FERROUS SULFATE TAB EC 325 MG (65 MG FE EQUIVALENT) 325 MG: 325 (65 FE) TABLET DELAYED RESPONSE at 18:34

## 2021-05-29 RX ADMIN — METHYLPREDNISOLONE SODIUM SUCCINATE 40 MG: 40 INJECTION, POWDER, FOR SOLUTION INTRAMUSCULAR; INTRAVENOUS at 08:09

## 2021-05-29 RX ADMIN — CETIRIZINE HYDROCHLORIDE 10 MG: 10 TABLET, FILM COATED ORAL at 08:10

## 2021-05-29 RX ADMIN — MONTELUKAST 10 MG: 10 TABLET, FILM COATED ORAL at 22:23

## 2021-05-29 RX ADMIN — SODIUM CHLORIDE, PRESERVATIVE FREE 10 ML: 5 INJECTION INTRAVENOUS at 22:23

## 2021-05-29 RX ADMIN — NORTRIPTYLINE HYDROCHLORIDE 10 MG: 10 CAPSULE ORAL at 22:22

## 2021-05-29 RX ADMIN — MIRTAZAPINE 15 MG: 15 TABLET, FILM COATED ORAL at 22:22

## 2021-05-29 RX ADMIN — Medication 1 MG: at 00:35

## 2021-05-29 RX ADMIN — DULOXETINE HYDROCHLORIDE 90 MG: 60 CAPSULE, DELAYED RELEASE ORAL at 08:10

## 2021-05-29 RX ADMIN — HYDROXYZINE HYDROCHLORIDE 25 MG: 25 TABLET, FILM COATED ORAL at 08:10

## 2021-05-29 RX ADMIN — FUROSEMIDE 40 MG: 40 TABLET ORAL at 08:09

## 2021-05-29 RX ADMIN — Medication 1 MG: at 08:09

## 2021-05-29 RX ADMIN — FUROSEMIDE 40 MG: 40 TABLET ORAL at 18:34

## 2021-05-29 RX ADMIN — ALBUTEROL SULFATE 2.5 MG: 2.5 SOLUTION RESPIRATORY (INHALATION) at 14:33

## 2021-05-29 RX ADMIN — Medication 1 MG: at 14:55

## 2021-05-29 RX ADMIN — FLUTICASONE PROPIONATE 1 SPRAY: 50 SPRAY, METERED NASAL at 08:09

## 2021-05-29 RX ADMIN — INSULIN LISPRO 6 UNITS: 100 INJECTION, SOLUTION INTRAVENOUS; SUBCUTANEOUS at 18:34

## 2021-05-29 RX ADMIN — MAGNESIUM GLUCONATE 500 MG ORAL TABLET 400 MG: 500 TABLET ORAL at 22:22

## 2021-05-29 RX ADMIN — BUPROPION HYDROCHLORIDE 150 MG: 150 TABLET, EXTENDED RELEASE ORAL at 08:10

## 2021-05-29 RX ADMIN — HYDROXYZINE HYDROCHLORIDE 25 MG: 25 TABLET, FILM COATED ORAL at 22:22

## 2021-05-29 RX ADMIN — INSULIN LISPRO 4 UNITS: 100 INJECTION, SOLUTION INTRAVENOUS; SUBCUTANEOUS at 12:40

## 2021-05-29 RX ADMIN — ALBUTEROL SULFATE 2.5 MG: 2.5 SOLUTION RESPIRATORY (INHALATION) at 10:32

## 2021-05-29 RX ADMIN — FUROSEMIDE 20 MG: 10 INJECTION, SOLUTION INTRAMUSCULAR; INTRAVENOUS at 20:38

## 2021-05-29 RX ADMIN — ACETAMINOPHEN 650 MG: 325 TABLET ORAL at 08:09

## 2021-05-29 RX ADMIN — TIOTROPIUM BROMIDE INHALATION SPRAY 2 PUFF: 3.12 SPRAY, METERED RESPIRATORY (INHALATION) at 09:19

## 2021-05-29 RX ADMIN — BUDESONIDE AND FORMOTEROL FUMARATE DIHYDRATE 1 PUFF: 160; 4.5 AEROSOL RESPIRATORY (INHALATION) at 18:22

## 2021-05-29 RX ADMIN — ALBUTEROL SULFATE 2.5 MG: 2.5 SOLUTION RESPIRATORY (INHALATION) at 18:13

## 2021-05-29 RX ADMIN — SODIUM CHLORIDE, PRESERVATIVE FREE 20 ML: 5 INJECTION INTRAVENOUS at 08:10

## 2021-05-29 RX ADMIN — MAGNESIUM GLUCONATE 500 MG ORAL TABLET 400 MG: 500 TABLET ORAL at 08:10

## 2021-05-29 RX ADMIN — DICLOFENAC SODIUM 2 G: 10 GEL TOPICAL at 10:23

## 2021-05-29 RX ADMIN — TIZANIDINE 2 MG: 2 TABLET ORAL at 22:23

## 2021-05-29 RX ADMIN — METHYLPREDNISOLONE SODIUM SUCCINATE 40 MG: 40 INJECTION, POWDER, FOR SOLUTION INTRAMUSCULAR; INTRAVENOUS at 16:52

## 2021-05-29 RX ADMIN — Medication 2000 UNITS: at 18:34

## 2021-05-29 ASSESSMENT — PAIN SCALES - GENERAL
PAINLEVEL_OUTOF10: 9
PAINLEVEL_OUTOF10: 8
PAINLEVEL_OUTOF10: 9
PAINLEVEL_OUTOF10: 9
PAINLEVEL_OUTOF10: 8
PAINLEVEL_OUTOF10: 3
PAINLEVEL_OUTOF10: 3

## 2021-05-29 ASSESSMENT — PAIN - FUNCTIONAL ASSESSMENT
PAIN_FUNCTIONAL_ASSESSMENT: PREVENTS OR INTERFERES SOME ACTIVE ACTIVITIES AND ADLS
PAIN_FUNCTIONAL_ASSESSMENT: PREVENTS OR INTERFERES SOME ACTIVE ACTIVITIES AND ADLS

## 2021-05-29 ASSESSMENT — PAIN DESCRIPTION - DESCRIPTORS
DESCRIPTORS: ACHING
DESCRIPTORS: DISCOMFORT

## 2021-05-29 ASSESSMENT — PAIN DESCRIPTION - FREQUENCY
FREQUENCY: CONTINUOUS
FREQUENCY: CONTINUOUS

## 2021-05-29 ASSESSMENT — PAIN DESCRIPTION - PROGRESSION
CLINICAL_PROGRESSION: NOT CHANGED
CLINICAL_PROGRESSION: GRADUALLY WORSENING
CLINICAL_PROGRESSION: GRADUALLY WORSENING
CLINICAL_PROGRESSION: NOT CHANGED

## 2021-05-29 ASSESSMENT — PAIN DESCRIPTION - LOCATION
LOCATION: SHOULDER
LOCATION: SHOULDER

## 2021-05-29 ASSESSMENT — PAIN DESCRIPTION - PAIN TYPE
TYPE: CHRONIC PAIN
TYPE: CHRONIC PAIN

## 2021-05-29 ASSESSMENT — PAIN DESCRIPTION - ONSET
ONSET: ON-GOING
ONSET: ON-GOING

## 2021-05-29 NOTE — PLAN OF CARE
Problem: Falls - Risk of:  Goal: Will remain free from falls  Description: Will remain free from falls  5/29/2021 0225 by Carrie Michael RN  Outcome: Met This Shift  5/28/2021 1252 by Zac Ibrahim RN  Outcome: Ongoing  Goal: Absence of physical injury  Description: Absence of physical injury  5/29/2021 0225 by Carrie Michael RN  Outcome: Met This Shift  5/28/2021 1252 by Zac Ibrahim RN  Outcome: Ongoing     Problem: Safety:  Goal: Free from accidental physical injury  Description: Free from accidental physical injury  5/29/2021 0225 by Carrie Michael RN  Outcome: Met This Shift  5/28/2021 1252 by Zac Ibrahim RN  Outcome: Ongoing  Goal: Free from intentional harm  Description: Free from intentional harm  5/29/2021 0225 by Carrie Michael RN  Outcome: Met This Shift  5/28/2021 1252 by Zac Ibrahim RN  Outcome: Ongoing     Problem: Skin Integrity:  Goal: Will show no infection signs and symptoms  Description: Will show no infection signs and symptoms  5/29/2021 0225 by Carrie Michael RN  Outcome: Ongoing  5/28/2021 1252 by Zac Ibrahim RN  Outcome: Ongoing  Goal: Absence of new skin breakdown  Description: Absence of new skin breakdown  5/29/2021 0225 by Carrie Michael RN  Outcome: Ongoing  5/28/2021 1252 by Zac Ibrahim RN  Outcome: Ongoing     Problem: Infection:  Goal: Will remain free from infection  Description: Will remain free from infection  5/29/2021 0225 by Carrie Michael RN  Outcome: Ongoing  5/28/2021 1252 by Zac Ibrahim RN  Outcome: Ongoing     Problem: Daily Care:  Goal: Daily care needs are met  Description: Daily care needs are met  5/29/2021 0225 by Carrie Michael RN  Outcome: Ongoing  5/28/2021 1252 by Zac Ibrahim RN  Outcome: Ongoing     Problem: Pain:  Goal: Patient's pain/discomfort is manageable  Description: Patient's pain/discomfort is manageable  5/29/2021 0225 by Carrie Michael RN  Outcome: Ongoing  5/28/2021 1252 by Zac Ibrahim RN  Outcome: Ongoing  Goal: Pain level will RN  Outcome: Ongoing     Problem: IP BALANCE  Goal: LTG - patient will maintain standing balance to allow for completion of daily activities  Outcome: Ongoing

## 2021-05-29 NOTE — PROGRESS NOTES
GI Progress notes    5/29/2021   12:12 PM    Name:  Jerardo Wilkerson  MRN:    [id number]     [place]:     [de-identified]   Room:  [address] [place] [address] Day: 4     Admit Date: 5/25/2021 12:33 PM  PCP: Barb Plaza DO    Subjective:     C/C:   Chief Complaint   Patient presents with    GI Bleeding       Interval History: Status: not changed. Patient overall status same  Hemoglobin stable  Denies any significant abdominal pain  No overt bleeding  Has cardiac issues respiratory issues  ROS:  Constitutional: negative for chills, fevers and sweats    Gastrointestinal: negative for abdominal pain, constipation, diarrhea, nausea and vomiting      Medications: Allergies:    Allergies   Allergen Reactions    Aspirin     Ibuprofen     Sulfa Antibiotics     Tape Consuella Champagne Tape]     Lyrica [Pregabalin] Other (See Comments)     \"made her goofy\"       Current Meds: sodium chloride flush 0.9 % injection 10 mL, 2 times per day  sodium chloride flush 0.9 % injection 10 mL, PRN  furosemide (LASIX) tablet 40 mg, BID  montelukast (SINGULAIR) tablet 10 mg, Nightly  0.9 % sodium chloride infusion, PRN  diclofenac sodium (VOLTAREN) 1 % gel 2 g, BID  0.9 % sodium chloride infusion, PRN  0.9 % sodium chloride infusion, PRN  insulin lispro (HUMALOG) injection vial 0-12 Units, TID WC  insulin lispro (HUMALOG) injection vial 0-6 Units, Nightly  glucose (GLUTOSE) 40 % oral gel 15 g, PRN  dextrose 50 % IV solution, PRN  glucagon (rDNA) injection 1 mg, PRN  dextrose 5 % solution, PRN  insulin glargine (LANTUS) injection vial 10 Units, BID  0.9 % sodium chloride infusion, PRN  sodium chloride flush 0.9 % injection 5-40 mL, Q12H  diphenhydrAMINE (BENADRYL) injection 25 mg, Q6H PRN  HYDROmorphone HCl PF (DILAUDID) injection 1 mg, Q3H PRN  0.9 % sodium chloride infusion, PRN  methylPREDNISolone sodium (SOLU-MEDROL) injection 40 mg, Daily  albuterol (PROVENTIL) nebulizer solution 2.5 mg, Q6H PRN  atorvastatin (LIPITOR) tablet 40 mg, Nightly activity: Not on file   Other Topics Concern    Not on file   Social History Narrative    Not on file     Social Determinants of Health     Financial Resource Strain:     Difficulty of Paying Living Expenses:    Food Insecurity:     Worried About Running Out of Food in the Last Year:     [address] in the Last Year:    Transportation Needs:     Lack of Transportation (Medical):  Lack of Transportation (Non-Medical):    Physical Activity:     Days of Exercise per Week:     Minutes of Exercise per Session:    Stress:     Feeling of Stress :    Social Connections:     Frequency of Communication with Friends and Family:     Frequency of Social Gatherings with Friends and Family:     Attends Jew Services:     Active Member of Clubs or Organizations:     Attends Club or Organization Meetings:     Marital Status:    Intimate Partner Violence:     Fear of Current or Ex-Partner:     Emotionally Abused:     Physically Abused:     Sexually Abused:        Vitals:  BP (!) 121/48   Pulse 76   Temp 97.9 °F (36.6 °C) (Oral)   Resp 18   Ht 4' 7\" (1.397 m)   Wt 185 lb 2 oz (84 kg)   SpO2 99%   BMI 43.03 kg/m²   Temp (24hrs), Av.1 °F (36.7 °C), Min:97.7 °F (36.5 °C), Max:98.6 °F (37 °C)    Recent Labs     21  1222 21  1634 21  1948 21  0732   POCGLU 196* 260* 144* 138*       I/O (24Hr):     Intake/Output Summary (Last 24 hours) at 2021 1212  Last data filed at 2021 0859  Gross per 24 hour   Intake 945.83 ml   Output 1775 ml   Net -829.17 ml       Labs:      CBC:   Lab Results   Component Value Date    WBC 15.3 2021    RBC 2.83 2021    HGB 8.0 2021    HCT 27.1 2021    MCV 90.5 2021    MCH 27.9 2021    MCHC 30.9 2021    RDW 17.2 2021     2021    MPV 10.2 2021     CBC with Differential:    Lab Results   Component Value Date    WBC 15.3 2021    RBC 2.83 2021    HGB 8.0 2021 HCT 27.1 05/29/2021     05/29/2021    MCV 90.5 05/29/2021    MCH 27.9 05/29/2021    MCHC 30.9 05/29/2021    RDW 17.2 05/29/2021    NRBC 1 04/21/2016    LYMPHOPCT 12 05/25/2021    MONOPCT 7 05/25/2021    MYELOPCT 2 04/21/2016    BASOPCT 0 05/25/2021    MONOSABS 0.89 05/25/2021    LYMPHSABS 1.52 05/25/2021    EOSABS 0.13 05/25/2021    BASOSABS 0.00 05/25/2021    DIFFTYPE NOT REPORTED 05/25/2021     Hemoglobin/Hematocrit:    Lab Results   Component Value Date    HGB 8.0 05/29/2021    HCT 27.1 05/29/2021     CMP:    Lab Results   Component Value Date     05/29/2021    K 3.7 05/29/2021     05/29/2021    CO2 20 05/29/2021    BUN 16 05/29/2021    CREATININE 1.34 05/29/2021    GFRAA 47 05/29/2021    LABGLOM 39 05/29/2021    GLUCOSE 179 05/29/2021    PROT 7.2 06/22/2017    LABALBU 3.1 06/22/2017    CALCIUM 7.5 05/29/2021    BILITOT 0.21 06/22/2017    ALKPHOS 117 06/22/2017    AST 17 06/22/2017    ALT 9 06/22/2017     BMP:    Lab Results   Component Value Date     05/29/2021    K 3.7 05/29/2021     05/29/2021    CO2 20 05/29/2021    BUN 16 05/29/2021    LABALBU 3.1 06/22/2017    CREATININE 1.34 05/29/2021    CALCIUM 7.5 05/29/2021    GFRAA 47 05/29/2021    LABGLOM 39 05/29/2021    GLUCOSE 179 05/29/2021     PT/INR:    Lab Results   Component Value Date    PROTIME 16.1 05/25/2021    INR 1.3 05/25/2021     PTT:    Lab Results   Component Value Date    APTT 16.8 06/22/2017   [APTT}    Physical Examination:        General appearance: Weak lethargic malnourished anxious     Abdomen: soft, nontender, nondistended, bowel sounds present    Assessment:        Primary Problem  GI (gastrointestinal bleed)     Active Hospital Problems    Diagnosis Date Noted    Pancolitis ([facility] [K51.00]     GI (gastrointestinal bleed) [K92.2] 05/25/2021    CHF (congestive heart failure) (HCC) [I50.9]     Anemia due to blood loss, acute [D62] 06/23/2017    Acute colitis [K52.9] 06/22/2017    BRBPR (bright red blood per rectum) [K62.5] 06/22/2017    Bipolar 1 disorder ([facility] [F31.9] 02/13/2017    Type 2 diabetes mellitus without complication, without long-term current use of insulin ([address] [E11.9] 01/01/2017    ASHLEIGH (acute kidney injury) ([facility] [N17.9] 04/16/2016    Chronic obstructive pulmonary disease with acute exacerbation ([address] [J44.1] 04/16/2016     Past Medical History:   Diagnosis Date    ASHLEIGH (acute kidney injury) ([address] 4/16/2016    Arthritis     Asthma     Bipolar 1 disorder ([address]     CHF (congestive heart failure) (HCC)     COPD (chronic obstructive pulmonary disease) (HCC)     3 L home O2    Depression     Diabetes mellitus (HCC)     Fibromyalgia     GERD (gastroesophageal reflux disease)     Hyperlipidemia     Hypertension     Pneumonia         Plan:        1. Continue PPI  2. Follow-up hemoglobin hematocrit  3. GI work-up once stable for it  4.  We will see as needed    Explained to the patient and d/W Nursing Staff in ICU  Will F/U with you  Please call or Page for any issues or change in status  Thanks    Electronically signed by Bartolo Munoz MD on 5/29/2021 at 12:12 PM

## 2021-05-29 NOTE — PLAN OF CARE
Problem: Falls - Risk of:  Goal: Will remain free from falls  Description: Will remain free from falls  5/29/2021 0745 by Allison Sal RN  Outcome: Ongoing  5/29/2021 0225 by Summer Francisco RN  Outcome: Met This Shift  Goal: Absence of physical injury  Description: Absence of physical injury  5/29/2021 0745 by Allison Sal RN  Outcome: Ongoing  5/29/2021 0225 by Summer Francisco RN  Outcome: Met This Shift     Problem: Skin Integrity:  Goal: Will show no infection signs and symptoms  Description: Will show no infection signs and symptoms  5/29/2021 0745 by Allison Sal RN  Outcome: Ongoing  5/29/2021 0225 by Summer Francisco RN  Outcome: Ongoing  Goal: Absence of new skin breakdown  Description: Absence of new skin breakdown  5/29/2021 0745 by Allison Sal RN  Outcome: Ongoing  5/29/2021 0225 by Summer Francisco RN  Outcome: Ongoing     Problem: Infection:  Goal: Will remain free from infection  Description: Will remain free from infection  5/29/2021 0745 by Allison Sal RN  Outcome: Ongoing  5/29/2021 0225 by Summer Francisco RN  Outcome: Ongoing     Problem: Safety:  Goal: Free from accidental physical injury  Description: Free from accidental physical injury  5/29/2021 0745 by Allison Sal RN  Outcome: Ongoing  5/29/2021 0225 by Summer Francisco RN  Outcome: Met This Shift  Goal: Free from intentional harm  Description: Free from intentional harm  5/29/2021 0745 by Allison Sal RN  Outcome: Ongoing  5/29/2021 0225 by Summer Francisco RN  Outcome: Met This Shift     Problem: Daily Care:  Goal: Daily care needs are met  Description: Daily care needs are met  5/29/2021 0745 by Allison Sal RN  Outcome: Ongoing  5/29/2021 0225 by Summer Francisco RN  Outcome: Ongoing     Problem: Pain:  Goal: Patient's pain/discomfort is manageable  Description: Patient's pain/discomfort is manageable  5/29/2021 0745 by Allison Sal RN  Outcome: Ongoing  5/29/2021 0225 by Summer Francisco RN  Outcome: Ongoing  Goal: Pain level will decrease  Description: Pain level will decrease  5/29/2021 0745 by Basilio Edmonds RN  Outcome: Ongoing  5/29/2021 0225 by Alfredo Mason RN  Outcome: Ongoing  Goal: Control of acute pain  Description: Control of acute pain  5/29/2021 0745 by Basilio Edmonds RN  Outcome: Ongoing  5/29/2021 0225 by Alfredo Mason RN  Outcome: Ongoing  Goal: Control of chronic pain  Description: Control of chronic pain  5/29/2021 0745 by Basilio Edmonds RN  Outcome: Ongoing  5/29/2021 0225 by Alfredo Mason RN  Outcome: Ongoing     Problem: Skin Integrity:  Goal: Skin integrity will stabilize  Description: Skin integrity will stabilize  5/29/2021 0745 by Basilio Edmonds RN  Outcome: Ongoing  5/29/2021 0225 by Alfredo Mason RN  Outcome: Ongoing     Problem: Discharge Planning:  Goal: Patients continuum of care needs are met  Description: Patients continuum of care needs are met  5/29/2021 0745 by Basilio Edmonds RN  Outcome: Ongoing  5/29/2021 0225 by Alfredo Mason RN  Outcome: Ongoing

## 2021-05-29 NOTE — PROGRESS NOTES
GI Progress notes    5/29/2021   12:09 PM    Name:  Milvia Mackay  MRN:    [id number]     [place]ellalyside:     [de-identified]   Room:  [address] [place] [address] Day: 4     Admit Date: 5/25/2021 12:33 PM  PCP: Christine Campos DO    Subjective:     C/C:   Chief Complaint   Patient presents with    GI Bleeding       Interval History: Status: improved. Slowly improving  CT scan does not reveal any necrosis of the pancreas  Pancreatic enzymes are improving  LFTs are elevated  No bleeding melena reported    ROS:  Not done at this time    Medications: Allergies:    Allergies   Allergen Reactions    Aspirin     Ibuprofen     Sulfa Antibiotics     Tape Albertina Galina Tape]     Lyrica [Pregabalin] Other (See Comments)     \"made her goofy\"       Current Meds: sodium chloride flush 0.9 % injection 10 mL, 2 times per day  sodium chloride flush 0.9 % injection 10 mL, PRN  furosemide (LASIX) tablet 40 mg, BID  montelukast (SINGULAIR) tablet 10 mg, Nightly  0.9 % sodium chloride infusion, PRN  diclofenac sodium (VOLTAREN) 1 % gel 2 g, BID  0.9 % sodium chloride infusion, PRN  0.9 % sodium chloride infusion, PRN  insulin lispro (HUMALOG) injection vial 0-12 Units, TID WC  insulin lispro (HUMALOG) injection vial 0-6 Units, Nightly  glucose (GLUTOSE) 40 % oral gel 15 g, PRN  dextrose 50 % IV solution, PRN  glucagon (rDNA) injection 1 mg, PRN  dextrose 5 % solution, PRN  insulin glargine (LANTUS) injection vial 10 Units, BID  0.9 % sodium chloride infusion, PRN  sodium chloride flush 0.9 % injection 5-40 mL, Q12H  diphenhydrAMINE (BENADRYL) injection 25 mg, Q6H PRN  HYDROmorphone HCl PF (DILAUDID) injection 1 mg, Q3H PRN  0.9 % sodium chloride infusion, PRN  methylPREDNISolone sodium (SOLU-MEDROL) injection 40 mg, Daily  albuterol (PROVENTIL) nebulizer solution 2.5 mg, Q6H PRN  atorvastatin (LIPITOR) tablet 40 mg, Nightly  budesonide-formoterol (SYMBICORT) 160-4.5 MCG/ACT inhaler 1 puff, BID  Vitamin D (CHOLECALCIFEROL) tablet 2,000 Units, Dinner  ferrous sulfate (FE TABS 325) EC tablet 325 mg, Dinner  fluticasone (FLONASE) 50 MCG/ACT nasal spray 1 spray, Daily  hydrOXYzine (ATARAX) tablet 25 mg, BID  loperamide (IMODIUM) capsule 2 mg, PRN  magnesium oxide (MAG-OX) tablet 400 mg, BID  mirtazapine (REMERON) tablet 15 mg, Nightly  pantoprazole (PROTONIX) tablet 40 mg, QAM AC  tiotropium (SPIRIVA RESPIMAT) 2.5 MCG/ACT inhaler 2 puff, Daily  tiZANidine (ZANAFLEX) tablet 2 mg, BID  sodium chloride flush 0.9 % injection 5-40 mL, PRN  0.9 % sodium chloride infusion, PRN  promethazine (PHENERGAN) tablet 12.5 mg, Q6H PRN   Or  ondansetron (ZOFRAN) injection 4 mg, Q6H PRN  acetaminophen (TYLENOL) tablet 650 mg, Q6H PRN   Or  acetaminophen (TYLENOL) suppository 650 mg, Q6H PRN  albuterol sulfate  (90 Base) MCG/ACT inhaler 2 puff, Q6H PRN  buPROPion (WELLBUTRIN XL) extended release tablet 150 mg, QAM  DULoxetine (CYMBALTA) extended release capsule 90 mg, Daily  LORazepam (ATIVAN) tablet 0.25 mg, BID PRN  nortriptyline (PAMELOR) capsule 10 mg, Nightly  cetirizine (ZYRTEC) tablet 10 mg, Daily  [Held by provider] alogliptin (NESINA) tablet 12.5 mg, Daily        Data:     Code Status:  Full Code    Family History   Problem Relation Age of Onset    Heart Failure Mother     Cancer Sister     Cancer Maternal Grandmother     Heart Failure Maternal Grandmother        Social History     Socioeconomic History    Marital status:       Spouse name: Not on file    Number of children: Not on file    Years of education: Not on file    Highest education level: Not on file   Occupational History    Not on file   Tobacco Use    Smoking status: Heavy Tobacco Smoker     Packs/day: 1.00     Years: 40.00     Pack years: 40.00    Smokeless tobacco: Never Used   Substance and Sexual Activity    Alcohol use: No    Drug use: No    Sexual activity: Not on file   Other Topics Concern    Not on file   Social History Narrative    Not on file     Social Determinants of Health     Financial Resource Strain:     Difficulty of Paying Living Expenses:    Food Insecurity:     Worried About [address] in the Last Year:     [address] in the Last Year:    Transportation Needs:     Lack of Transportation (Medical):  Lack of Transportation (Non-Medical):    Physical Activity:     Days of Exercise per Week:     Minutes of Exercise per Session:    Stress:     Feeling of Stress :    Social Connections:     Frequency of Communication with Friends and Family:     Frequency of Social Gatherings with Friends and Family:     Attends Hinduism Services:     Active Member of Clubs or Organizations:     Attends Club or Organization Meetings:     Marital Status:    Intimate Partner Violence:     Fear of Current or Ex-Partner:     Emotionally Abused:     Physically Abused:     Sexually Abused:        Vitals:  BP (!) 121/48   Pulse 76   Temp 97.9 °F (36.6 °C) (Oral)   Resp 18   Ht 4' 7\" (1.397 m)   Wt 185 lb 2 oz (84 kg)   SpO2 99%   BMI 43.03 kg/m²   Temp (24hrs), Av.1 °F (36.7 °C), Min:97.7 °F (36.5 °C), Max:98.6 °F (37 °C)    Recent Labs     21  1222 21  1634 21  1948 21  0732   POCGLU 196* 260* 144* 138*       I/O (24Hr):     Intake/Output Summary (Last 24 hours) at 2021 1209  Last data filed at 2021 0859  Gross per 24 hour   Intake 945.83 ml   Output 1775 ml   Net -829.17 ml       Labs:      CBC:   Lab Results   Component Value Date    WBC 15.3 2021    RBC 2.83 2021    HGB 8.0 2021    HCT 27.1 2021    MCV 90.5 2021    MCH 27.9 2021    MCHC 30.9 2021    RDW 17.2 2021     2021    MPV 10.2 2021     CBC with Differential:    Lab Results   Component Value Date    WBC 15.3 2021    RBC 2.83 2021    HGB 8.0 2021    HCT 27.1 2021     2021    MCV 90.5 2021    MCH 27.9 2021    MCHC 30.9 2021    RDW 17.2 05/29/2021    NRBC 1 04/21/2016    LYMPHOPCT 12 05/25/2021    MONOPCT 7 05/25/2021    MYELOPCT 2 04/21/2016    BASOPCT 0 05/25/2021    MONOSABS 0.89 05/25/2021    LYMPHSABS 1.52 05/25/2021    EOSABS 0.13 05/25/2021    BASOSABS 0.00 05/25/2021    DIFFTYPE NOT REPORTED 05/25/2021     Hemoglobin/Hematocrit:    Lab Results   Component Value Date    HGB 8.0 05/29/2021    HCT 27.1 05/29/2021     CMP:    Lab Results   Component Value Date     05/29/2021    K 3.7 05/29/2021     05/29/2021    CO2 20 05/29/2021    BUN 16 05/29/2021    CREATININE 1.34 05/29/2021    GFRAA 47 05/29/2021    LABGLOM 39 05/29/2021    GLUCOSE 179 05/29/2021    PROT 7.2 06/22/2017    LABALBU 3.1 06/22/2017    CALCIUM 7.5 05/29/2021    BILITOT 0.21 06/22/2017    ALKPHOS 117 06/22/2017    AST 17 06/22/2017    ALT 9 06/22/2017     BMP:    Lab Results   Component Value Date     05/29/2021    K 3.7 05/29/2021     05/29/2021    CO2 20 05/29/2021    BUN 16 05/29/2021    LABALBU 3.1 06/22/2017    CREATININE 1.34 05/29/2021    CALCIUM 7.5 05/29/2021    GFRAA 47 05/29/2021    LABGLOM 39 05/29/2021    GLUCOSE 179 05/29/2021     PT/INR:    Lab Results   Component Value Date    PROTIME 16.1 05/25/2021    INR 1.3 05/25/2021     PTT:    Lab Results   Component Value Date    APTT 16.8 06/22/2017   [APTT}    Physical Examination:        Not done secondary to COVID-19 status    Assessment:        Primary Problem  GI (gastrointestinal bleed)     Active Hospital Problems    Diagnosis Date Noted    Pancolitis ([address] [K51.00]     GI (gastrointestinal bleed) [K92.2] 05/25/2021    CHF (congestive heart failure) ([facility]) [I50.9]     Anemia due to blood loss, acute [D62] 06/23/2017    Acute colitis [K52.9] 06/22/2017    BRBPR (bright red blood per rectum) [K62.5] 06/22/2017    Bipolar 1 disorder ([facility] [F31.9] 02/13/2017    Type 2 diabetes mellitus without complication, without long-term current use of insulin ([facility] [E11.9] 01/01/2017    ASHLEIGH (acute kidney injury) ([facility] [N17.9] 04/16/2016    Chronic obstructive pulmonary disease with acute exacerbation ([place] [address] [J44.1] 04/16/2016     Past Medical History:   Diagnosis Date    ASHLEIGH (acute kidney injury) ([facility] 4/16/2016    Arthritis     Asthma     Bipolar 1 disorder ([facility]     CHF (congestive heart failure) (HCC)     COPD (chronic obstructive pulmonary disease) (HCC)     3 L home O2    Depression     Diabetes mellitus (HCC)     Fibromyalgia     GERD (gastroesophageal reflux disease)     Hyperlipidemia     Hypertension     Pneumonia         Plan:        1. Continue current supportive care  2. Follow-up LFTs amylase lipase  3. Liver disease work-up as ordered  4.  Reevaluate in the morning     d/W Nursing Staff  Will F/U with you  Please call or Page for any issues or change in status  Thanks    Electronically signed by Ethel Dubon MD on 5/29/2021 at 12:09 PM

## 2021-05-29 NOTE — PROGRESS NOTES
I have discussed with the patient the rationale for blood transfusion, its benefits in treating or preventing dangerously low hemoglobin levels which could lead to fatigue, organ damage or death, and its risk which include: mild transfusion reactions, rare risk of blood borne infection, or more serious but rare allergic reactions. I have discussed the alternatives to transfusion, including the risk and consequences of not receiving transfusion. The patient had an opportunity to ask questions and had agreed to proceed with transfusion of packed red blood cells. Writer at bedside first fifteen minutes of infusion. No s/s of infusion reaction noted. Pt states she is feeling okay.    Electronically signed by Dhruv Cabrera RN on 5/29/2021 at 6:17 PM

## 2021-05-29 NOTE — PROGRESS NOTES
Hysterectomy; Tonsillectomy; Knee Arthroplasty (Left); Foot fracture surgery (Left); pr egd transoral biopsy single/multiple (N/A, 2/14/2017); pr colsc flx w/rmvl of tumor polyp lesion snare tq (6/26/2017); Colonoscopy (6/26/2017); Upper gastrointestinal endoscopy (N/A, 5/26/2021); Colonoscopy (N/A, 5/26/2021); and IR INSERT PICC VAD W SQ PORT >5 YEARS (5/28/2021). Restrictions  Restrictions/Precautions  Restrictions/Precautions: General Precautions, Up as Tolerated  Position Activity Restriction  Other position/activity restrictions: clear liquid diet, telemetry, LUE IV, 02  Vision/Hearing        Subjective  General  Chart Reviewed: Yes  Patient assessed for rehabilitation services?: Yes  Additional Pertinent Hx: OA, Bipolar, CHF, COPD, DM , Fibromyalgia  Response To Previous Treatment: Not applicable  Family / Caregiver Present: No  Diagnosis: GI bled  Follows Commands: Within Functional Limits  General Comment  Comments: Laney Boss RN reports patient medically appropriate for PT. Subjective  Subjective: Patient verbalized frustration at having to urinate all night due to lasix and frustration at not feeling well. Patient lunch try present, but patient reported she had not eaten yet because she did not realize it was there. Pain Screening  Patient Currently in Pain: Yes     Pre Treatment Pain Screening  Intervention List: Patient able to continue with treatment    Orientation     Social/Functional History  Social/Functional History  Lives With: Alone  Type of Home: Apartment  Home Layout: One level (3rd floor apartment)  Home Access: Level entry, Elevator  Bathroom Shower/Tub: Tub/Shower unit  Bathroom Toilet: Handicap height  Bathroom Equipment: Grab bars in shower, Shower chair, Grab bars around toilet (pull tab for emergencies)  Home Equipment: Ailvxing net, [facility], 4 wheeled walker  Receives Help From: Home health, Family (Pt has HHA 7 days a week 6hrs a day and nurse onces  a week.  Has someone with her all the time if aids are not there daughter is)  ADL Assistance: Needs assistance (Pt states HHA assist with dressing and bathing)  Homemaking Assistance: Needs assistance (Pt states HHA help with cooking, cleaning and grocery shopping)  Homemaking Responsibilities: No  Ambulation Assistance: Independent (uses walker)  Transfer Assistance: Independent  Active : No  Patient's  Info: daughter or HHA drive her  Occupation: Retired  Type of occupation: factory and retail  Leisure & Hobbies: plays games on phone, playing dice/card games with aids, word searches  Additional Comments: Pt states she had one fall a year ago and broke her ankle. Pt has been at [facility] for around a year d/t complications with the ankle surgery. Pt came home in April and has someone with her ~ 24/7. Cognition   Cognition  Overall Cognitive Status: Exceptions  Arousal/Alertness: Appropriate responses to stimuli  Following Commands: Follows multistep commands with repitition; Follows multistep commands with increased time  Attention Span: Appears intact  Memory: Appears intact  Safety Judgement: Decreased awareness of need for safety  Problem Solving: Decreased awareness of errors;Assistance required to identify errors made;Assistance required to correct errors made  Insights: Decreased awareness of deficits  Initiation: Does not require cues  Sequencing: Requires cues for some    Objective     Observation/Palpation  Posture: Fair  Observation: 02, telemetry. Patient voiced frustrations, upset that therapist wanting to see her. Explained to patient that needed to eval since she had already been cancelled 2 days and needs eval for SNF referral. PT also voiced concern about patient's cough sounding very \"wet\" and importance of being upright to improve respiratory status. Offered to get patient up to chair to eat her lunch.     AROM RLE (degrees)  RLE AROM: WFL  AROM LLE (degrees)  LLE AROM : [facility] Other  Other: Unable to MMT due to patient's frustrations, appear at least 4-/5 BLE. Tone RLE  RLE Tone: Normotonic  Tone LLE  LLE Tone: Normotonic     Bed mobility  Rolling to Left: Independent  Rolling to Right: Independent  Supine to Sit: Independent  Sit to Supine: Independent  Scooting: Independent  Transfers  Sit to Stand: Minimal Assistance  Stand to sit: Minimal Assistance  Bed to Chair: Minimal assistance  Stand Pivot Transfers: Minimal Assistance  Comment: Patient impulsive with transfer, not giving therapist time to organize 02 tubing. Patient demo decreased safety with transfer. Ambulation  Ambulation?: Yes  Ambulation 1  Device: No Device  Assistance: Minimal assistance  Quality of Gait: Patient amb bed to chair with little regard for safety or 02 tubing. Distance: 2 feet bed to chair     Balance  Sitting - Static: Good  Sitting - Dynamic: Good  Standing - Static: Fair  Standing - Dynamic: [address]  Times per week: 1-2x/d, 5-6 d/wk  Current Treatment Recommendations: Strengthening, Balance Training, Functional Mobility Training, Transfer Training, Gait Training, Endurance Training, Safety Education & Training, Patient/Caregiver Education & Training, Home Exercise Program  Safety Devices  Type of devices: All fall risk precautions in place, Gait belt, Nurse notified, Call light within reach, Chair alarm in place, Left in chair      OutComes Score    AM-PAC Score  AM-PAC Inpatient Mobility Raw Score : 17 (05/29/21 1437)  AM-PAC Inpatient T-Scale Score : 42.13 (05/29/21 1437)  Mobility Inpatient CMS 0-100% Score: 50.57 (05/29/21 1437)  Mobility Inpatient CMS G-Code Modifier : CK (05/29/21 1437)          Goals  Short term goals  Time Frame for Short term goals: 12 visits  Short term goal 1: Patient will be SBA for transfers. Short term goal 2: Patient will amb 50 feet with RW and SBA. Short term goal 3: Patient will have fair+ standing balance.   Short term goal 4: Patient will tolerate 30 minutes for ther-ex and ther-act.   Patient Goals   Patient goals : none stated       Therapy Time   Individual Concurrent Group Co-treatment   Time In 1250         Time Out 1309         Minutes [address], PT

## 2021-05-29 NOTE — PROGRESS NOTES
[facility]  Office: [address], DO, Annie Gross, DO, Fatuma Morejon, DO, [facility], DO, Steve Nunn MD, Yvette Mcconnell MD, Dante Blas MD, Isabell Muñoz MD, Oliva Sanford MD, Danilo Farley MD, Mile Morrow MD, Bj Bailey MD, Deisy Blanco, DO, Augusto Conteh MD, Tootie Agarwal, DO, Harrietta Goldberg, MD,  Chinedu Cotton, DO, Diego Thomas MD, Reji Francisco MD, Irvin Crocker MD, Devonte Nunez MD, Shaquille Hermosillo, [facility], [place], CNP, Boone Mercado, CNP, Armond Salmeron, CNS, [place], CNP, Sudhir Jon, CNP, Patrick Hernandez, CNP, Sol Zuniga, CNP, Emanuel Weldon, CNP, ZAK CidC, Kim Foster, [facility], Desi [place], CNP, Ji Hartley, CNP, Savi Blevins, CNP, Shefali Rdz, CNP, Kevin Plush, CNP, Marleny VillalpandoH[facility]    Progress Note    5/29/2021    9:16 AM    Name:   Jose Zarco  MRN:     [id number]     Arabellalyside:      [de-identified]   Room:   [facility] Day:  4  Admit Date:  5/25/2021 12:33 PM    PCP:   Barbara Smith DO  Code Status:  Full Code    Subjective:     C/C:   Chief Complaint   Patient presents with    GI Bleeding     Interval History Status: not changed. Patient continues to have bloody stools. She was encouraged to start a clear liquid diet by GI, but refused. She required 1 unit PRBCs overnight. Hb 8 --> 6.8 this afternoon. Patient is slightly short of breath, wheezing and coughing. She is concerned that her Lasix is now 40 mg twice a day, and continues to urinate a lot. Brief History:     Per the NP:  \"5/25 -patient has a history of A. fib on Eliquis.  Over the past 3 days patient has noted bright red blood per rectum.  Patient reports that during that same time. Natasha Fernandez has began experiencing worsening fatigue and shortness of breath.  Patient is now to the point where she has severe shortness of breath with minimal exertion and severe dizziness and weakness while rising.  Patient continues to experience dark red stools mixed with bright red blood and liquid diarrhea.  CT of the abdomen shows pancolitis. GI consulted.      5/26 -patient received blood transfusions. Reportedly going for EGD and colonoscopy today. Stool culture is pending.     5/27 - Little change. Steroids started per GI following scopes. Inflammatory bowel disease with diffuse colitis noted in GI note. Still passing blood and has now required 5 units of blood. GI completed EGD and colonoscopy yesterday and showed diffuse colitis. Patient continues to endorse abdominal discomfort. Steroids started per GI following scopes. Inflammatory bowel disease with diffuse colitis noted in GI note. Still passing blood and has now required 5 units of blood. \"    Review of Systems:     Constitutional:  negative for chills, fevers, sweats  Respiratory: Positive for cough, dyspnea on exertion, shortness of breath, wheezing  Cardiovascular:  negative for chest pain, chest pressure/discomfort, lower extremity edema, palpitations  Gastrointestinal:  negative for abdominal pain, constipation, nausea, vomiting, + hematochezia  Neurological:  negative for dizziness, headache    Medications: Allergies:     Allergies   Allergen Reactions    Aspirin     Ibuprofen     Sulfa Antibiotics     Tape Camillo Mood Tape]     Lyrica [Pregabalin] Other (See Comments)     \"made her goofy\"       Current Meds:   Scheduled Meds:    sodium chloride flush  10 mL Intravenous 2 times per day    furosemide  40 mg Oral BID    montelukast  10 mg Oral Nightly    diclofenac sodium  2 g Topical BID    insulin lispro  0-12 Units Subcutaneous TID WC    insulin lispro  0-6 Units Subcutaneous Nightly    insulin glargine  10 Units Subcutaneous BID    sodium chloride flush  5-40 mL Intravenous Q12H    methylPREDNISolone  40 mg Intravenous Daily    atorvastatin  40 mg Oral Nightly    budesonide-formoterol  1 puff Inhalation BID    Vitamin D  2,000 Units Oral Dinner    ferrous sulfate  325 mg Oral Dinner    fluticasone  1 spray Nasal Daily    hydrOXYzine  25 mg Oral BID    magnesium oxide  400 mg Oral BID    mirtazapine  15 mg Oral Nightly    pantoprazole  40 mg Oral QAM AC    tiotropium  2 puff Inhalation Daily    tiZANidine  2 mg Oral BID    buPROPion  150 mg Oral QAM    DULoxetine  90 mg Oral Daily    nortriptyline  10 mg Oral Nightly    cetirizine  10 mg Oral Daily    [Held by provider] alogliptin  12.5 mg Oral Daily     Continuous Infusions:    sodium chloride      sodium chloride      sodium chloride      dextrose      sodium chloride      sodium chloride      sodium chloride       PRN Meds: sodium chloride flush, sodium chloride, sodium chloride, sodium chloride, glucose, dextrose, glucagon (rDNA), dextrose, sodium chloride, diphenhydrAMINE, HYDROmorphone, sodium chloride, albuterol, loperamide, sodium chloride flush, sodium chloride, promethazine **OR** ondansetron, acetaminophen **OR** acetaminophen, albuterol sulfate HFA, LORazepam    Data:     Past Medical History:   has a past medical history of ASHLEIGH [facility]) [facility] Arthritis, Asthma, Bipolar 1 disorder ([facility] CHF (congestive heart failure) ([facility] COPD (chronic obstructive pulmonary disease) ([facility] Depression, Diabetes mellitus ([facility] Fibromyalgia, GERD (gastroesophageal reflux disease), Hyperlipidemia, Hypertension, and Pneumonia. Social History:   reports that she has been smoking. She has a 40.00 pack-year smoking history. She has never used smokeless tobacco. She reports that she does not drink alcohol and does not use drugs.      Family History:   Family History   Problem Relation Age of Onset    Heart Failure Mother     Cancer Sister     Cancer Maternal Grandmother     Heart Failure Maternal Grandmother        Vitals:  BP (!) 125/54   Pulse 83   Temp 97.9 °F (36.6 °C) (Oral)   Resp 18   Ht 4' 7\" (1.397 m)   Wt 185 lb 2 oz (84 kg)   SpO2 98%   BMI 43.03 kg/m²   Temp (24hrs), Av.2 °F (36.8 °C), Min:97.7 °F (36.5 °C), Max:98.6 °F (37 °C)    Recent Labs     21  1222 21  1634 21  0732   POCGLU 196* 260* 144* 138*       I/O (24Hr): Intake/Output Summary (Last 24 hours) at 2021 0916  Last data filed at 2021 0859  Gross per 24 hour   Intake 945.83 ml   Output 1775 ml   Net -829.17 ml       Labs:  Hematology:  Recent Labs     21  1454 21  0339   WBC  --   --  15.3*   RBC  --   --  2.83*   HGB 7.3* 6.6* 7.9*   HCT 23.4* 21.6* 25.6*   MCV  --   --  90.5   MCH  --   --  27.9   MCHC  --   --  30.9   RDW  --   --  17.2*   PLT  --   --  255   MPV  --   --  10.2     Chemistry:  Recent Labs     21  0551 21  0546 21  0339    136 139   K 3.9 3.8 3.7   * 110* 110*   CO2 17* 19* 20   GLUCOSE 245* 141* 179*   BUN 14 16 16   CREATININE 0.98* 1.34* 1.34*   ANIONGAP 12 7* 9   LABGLOM 55* 39* 39*   GFRAA >60 47* 47*   CALCIUM 7.3* 7.2* 7.5*     Recent Labs     21  19321  0708 21  1222 21  16321  0732   POCGLU 275* 122* 196* 260* 144* 138*     ABG:  Lab Results   Component Value Date    POCPH 7.26 2016    POCPCO2 56 2016    POCPO2 80 2016    POCHCO3 25.3 2016    NBEA 2 2016    PBEA NOT REPORTED 2016    HHT4GEU 27 2016    JGJJ5KRZ 93 2016    FIO2 NOT REPORTED 2016     Lab Results   Component Value Date/Time    SPECIAL NOT REPORTED 2017 10:08 PM     Lab Results   Component Value Date/Time    CULTURE ESCHERICHIA COLI 50 to 100,000 CFU/ML (A) 2017 10:08 PM    CULTURE  2017 10:08 PM     [employer] [address] [place] [address] [phone]       Radiology:  CT ABDOMEN PELVIS W IV CONTRAST Additional Contrast? None    Result Date: 2021  Acute nonspecific pancolitis.   Infectious or inflammatory colitis can be considered, less likely ischemic since the major visceral arteries appear patent. XR CHEST PORTABLE    Result Date: 5/26/2021  Subtle bibasilar infiltrates representing atelectasis versus pneumonia. IR INSERT PICC VAD W SQ PORT >5 YEARS    Result Date: 5/28/2021  Successful ultrasound and fluoroscopy guided right basilic vein 5 French power injectable dual-lumen PICC placement. Ready for use. Physical Examination:        General appearance:  alert, cooperative and coughing, pale  Mental Status:  oriented to person, place and time and normal affect  Lungs:  + crackles bilaterally, reduced BS, normal effort  Heart:  regular rate and rhythm, no murmur  Abdomen:  soft, nontender, nondistended, normal bowel sounds, no masses, hepatomegaly, splenomegaly  Extremities:  +1 pedal edema, no redness, tenderness in the calves  Skin:  no gross lesions, rashes, induration    Assessment:        Hospital Problems         Last Modified POA    * (Principal) GI (gastrointestinal bleed) 5/25/2021 Yes    Chronic obstructive pulmonary disease with acute exacerbation ([address] 5/25/2021 Yes    ASHLEIGH (acute kidney injury) ([address] 5/25/2021 Yes    Type 2 diabetes mellitus without complication, without long-term current use of insulin ([address] 5/25/2021 Yes    Bipolar 1 disorder ([address] 5/25/2021 Yes    BRBPR (bright red blood per rectum) 5/25/2021 Yes    Acute colitis 5/25/2021 Yes    Anemia due to blood loss, acute 5/25/2021 Yes    CHF (congestive heart failure) ([address] 5/25/2021 Yes    Pancolitis ([address] 5/26/2021 Yes          Plan:        Acute blood loss anemia secondary to GI bleed-hemoglobin 6.8, repeat H/H, okay to draw off PICC line, transfuse as needed, monitor H/H. Found to have diffuse colitis, IV steroids initiated by GI for inflammatory bowel disease. Patient has received 6 units so far.   Change diet to clear liquids  DM2-hold Nesina, continue Lantus, SSI  COPD/asthma- Increase IV solumedrol 40 mg q8 hrs, patient asking for breathing treatment, continue Symbicort, Singulair, Zyrtec, schedule albuterol nebulizer every 6 hours, follow-up chest x-ray  CHF-concern for volume overload, stop IV fluids, increase Lasix 40 mg p.o. twice daily, monitor output, start external Altamirano catheter  Bipolar disorder-stable  Atrial fibrillation-heart rate controlled, GI recommends against any blood thinners at this point due to her continued bleeding, discussed with Dr. Lieutenant Gonzalez  GI prophylaxis      Discussed with nurse       Leela Barton MD  5/29/2021  9:16 AM

## 2021-05-29 NOTE — PROGRESS NOTES
GI Progress notes    5/29/2021   12:06 PM    Name:  Krystina Miller  MRN:    [id number]     Sanket:     [de-identified]   Room:  [address] [place] [address] Day: 4     Admit Date: 5/25/2021 12:33 PM  PCP: Cricket Robles DO    Subjective:     C/C:   Chief Complaint   Patient presents with    GI Bleeding       Interval History: Status: improved. Patient is slowly improving  However still having rectal bleeding  Had 4 bowel movements since morning  Small amount of blood  Some abdominal discomfort  On soft diet and does not want to change anything with that  Appears to have irritable bowel syndrome-like symptoms and anxiety issues as well  Denies nausea vomiting hematemesis  Has obesity  Previous records reviewed and care was discussed with my partner who was following until yesterday    ROS:  Constitutional: negative for chills, fevers and sweats  Respiratory: negative for cough, dyspnea on exertion, hemoptysis, shortness of breath and wheezing  C  Gastrointestinal: Positive abdominal pain, constipation, positive diarrhea, nausea and vomiting  Neurological: negative for dizziness and positive headaches    Medications: Allergies:    Allergies   Allergen Reactions    Aspirin     Ibuprofen     Sulfa Antibiotics     Tape Ezella Aquas Tape]     Lyrica [Pregabalin] Other (See Comments)     \"made her goofy\"       Current Meds: sodium chloride flush 0.9 % injection 10 mL, 2 times per day  sodium chloride flush 0.9 % injection 10 mL, PRN  furosemide (LASIX) tablet 40 mg, BID  montelukast (SINGULAIR) tablet 10 mg, Nightly  0.9 % sodium chloride infusion, PRN  diclofenac sodium (VOLTAREN) 1 % gel 2 g, BID  0.9 % sodium chloride infusion, PRN  0.9 % sodium chloride infusion, PRN  insulin lispro (HUMALOG) injection vial 0-12 Units, TID WC  insulin lispro (HUMALOG) injection vial 0-6 Units, Nightly  glucose (GLUTOSE) 40 % oral gel 15 g, PRN  dextrose 50 % IV solution, PRN  glucagon (rDNA) injection 1 mg, PRN  dextrose 5 % solution, PRN  insulin glargine (LANTUS) injection vial 10 Units, BID  0.9 % sodium chloride infusion, PRN  sodium chloride flush 0.9 % injection 5-40 mL, Q12H  diphenhydrAMINE (BENADRYL) injection 25 mg, Q6H PRN  HYDROmorphone HCl PF (DILAUDID) injection 1 mg, Q3H PRN  0.9 % sodium chloride infusion, PRN  methylPREDNISolone sodium (SOLU-MEDROL) injection 40 mg, Daily  albuterol (PROVENTIL) nebulizer solution 2.5 mg, Q6H PRN  atorvastatin (LIPITOR) tablet 40 mg, Nightly  budesonide-formoterol (SYMBICORT) 160-4.5 MCG/ACT inhaler 1 puff, BID  Vitamin D (CHOLECALCIFEROL) tablet 2,000 Units, Dinner  ferrous sulfate (FE TABS 325) EC tablet 325 mg, Dinner  fluticasone (FLONASE) 50 MCG/ACT nasal spray 1 spray, Daily  hydrOXYzine (ATARAX) tablet 25 mg, BID  loperamide (IMODIUM) capsule 2 mg, PRN  magnesium oxide (MAG-OX) tablet 400 mg, BID  mirtazapine (REMERON) tablet 15 mg, Nightly  pantoprazole (PROTONIX) tablet 40 mg, QAM AC  tiotropium (SPIRIVA RESPIMAT) 2.5 MCG/ACT inhaler 2 puff, Daily  tiZANidine (ZANAFLEX) tablet 2 mg, BID  sodium chloride flush 0.9 % injection 5-40 mL, PRN  0.9 % sodium chloride infusion, PRN  promethazine (PHENERGAN) tablet 12.5 mg, Q6H PRN   Or  ondansetron (ZOFRAN) injection 4 mg, Q6H PRN  acetaminophen (TYLENOL) tablet 650 mg, Q6H PRN   Or  acetaminophen (TYLENOL) suppository 650 mg, Q6H PRN  albuterol sulfate  (90 Base) MCG/ACT inhaler 2 puff, Q6H PRN  buPROPion (WELLBUTRIN XL) extended release tablet 150 mg, QAM  DULoxetine (CYMBALTA) extended release capsule 90 mg, Daily  LORazepam (ATIVAN) tablet 0.25 mg, BID PRN  nortriptyline (PAMELOR) capsule 10 mg, Nightly  cetirizine (ZYRTEC) tablet 10 mg, Daily  [Held by provider] alogliptin (NESINA) tablet 12.5 mg, Daily        Data:     Code Status:  Full Code    Family History   Problem Relation Age of Onset    Heart Failure Mother     Cancer Sister     Cancer Maternal Grandmother     Heart Failure Maternal Grandmother        Social History ml   Output 1775 ml   Net -829.17 ml       Labs:      CBC:   Lab Results   Component Value Date    WBC 15.3 05/29/2021    RBC 2.83 05/29/2021    HGB 8.0 05/29/2021    HCT 27.1 05/29/2021    MCV 90.5 05/29/2021    MCH 27.9 05/29/2021    MCHC 30.9 05/29/2021    RDW 17.2 05/29/2021     05/29/2021    MPV 10.2 05/29/2021     CBC with Differential:    Lab Results   Component Value Date    WBC 15.3 05/29/2021    RBC 2.83 05/29/2021    HGB 8.0 05/29/2021    HCT 27.1 05/29/2021     05/29/2021    MCV 90.5 05/29/2021    MCH 27.9 05/29/2021    MCHC 30.9 05/29/2021    RDW 17.2 05/29/2021    NRBC 1 04/21/2016    LYMPHOPCT 12 05/25/2021    MONOPCT 7 05/25/2021    MYELOPCT 2 04/21/2016    BASOPCT 0 05/25/2021    MONOSABS 0.89 05/25/2021    LYMPHSABS 1.52 05/25/2021    EOSABS 0.13 05/25/2021    BASOSABS 0.00 05/25/2021    DIFFTYPE NOT REPORTED 05/25/2021     Hemoglobin/Hematocrit:    Lab Results   Component Value Date    HGB 8.0 05/29/2021    HCT 27.1 05/29/2021     CMP:    Lab Results   Component Value Date     05/29/2021    K 3.7 05/29/2021     05/29/2021    CO2 20 05/29/2021    BUN 16 05/29/2021    CREATININE 1.34 05/29/2021    GFRAA 47 05/29/2021    LABGLOM 39 05/29/2021    GLUCOSE 179 05/29/2021    PROT 7.2 06/22/2017    LABALBU 3.1 06/22/2017    CALCIUM 7.5 05/29/2021    BILITOT 0.21 06/22/2017    ALKPHOS 117 06/22/2017    AST 17 06/22/2017    ALT 9 06/22/2017     BMP:    Lab Results   Component Value Date     05/29/2021    K 3.7 05/29/2021     05/29/2021    CO2 20 05/29/2021    BUN 16 05/29/2021    LABALBU 3.1 06/22/2017    CREATININE 1.34 05/29/2021    CALCIUM 7.5 05/29/2021    GFRAA 47 05/29/2021    LABGLOM 39 05/29/2021    GLUCOSE 179 05/29/2021     PT/INR:    Lab Results   Component Value Date    PROTIME 16.1 05/25/2021    INR 1.3 05/25/2021     PTT:    Lab Results   Component Value Date    APTT 16.8 06/22/2017   [APTT}    Physical Examination:        General appearance: alert,

## 2021-05-29 NOTE — PROGRESS NOTES
Blood consent confirmed, new T&C done. Blood transfusion started, pt observed for first 15 minutes. No suspected reaction at this time.

## 2021-05-29 NOTE — PROGRESS NOTES
Nutrition Assessment     Type and Reason for Visit: Reassess    Nutrition Recommendations/Plan:   1. Continue Clear liquid diet  2. Start Ensure Clear 2x/day  3. Monitor p.o intakes, diet tolerance, diet advancement, GI status and labs     Nutrition Assessment:  At time of visit patient was on a carb controlled diet  reported eating 100% of lunch which was tolerated. At this time diet has be down graded to Clear liquids. Diarrhea is improving but some blood is still present. Start Ensure Clear 2x/day. Monitor diet advancement, GI status and labs. Malnutrition Assessment:  Malnutrition Status: At risk for malnutrition (Comment)    Estimated Daily Nutrient Needs:  Energy (kcal): 1230 kcal ([place], stress factor 1.1); Weight Used for Energy Requirements:  Other (Comment) (80.8 kg)     Protein (g): 68 grams (2.0g/kg); Weight Used for Protein Requirements:  Ideal         Nutrition Related Findings: Trace BLE edema. Active bowel sounds, improved diarrhea but some blood is still present      Current Nutrition Therapies:    DIET CLEAR LIQUID; Anthropometric Measures:  · Height: 4' 7\" (139.7 cm)  · Current Body Wt: 185 lb (83.9 kg) (likely inaccurate)   · BMI: 43    Nutrition Diagnosis:   · Altered GI function related to altered GI function (G I bleed) as evidenced by lab values, intake 0-25%, diarrhea      Nutrition Interventions:   Food and/or Nutrient Delivery:  Continue Current Diet  Nutrition Education/Counseling:  Education not indicated   Coordination of Nutrition Care:  Continue to monitor while inpatient    Goals:  p o intake >50%       Nutrition Monitoring and Evaluation:   Food/Nutrient Intake Outcomes:  Food and Nutrient Intake, Supplement Intake  Physical Signs/Symptoms Outcomes:  Biochemical Data, Fluid Status or Edema, Weight, Skin, Diarrhea, GI Status     Discharge Planning:     Too soon to determine         Chantelle Winter  MFN, RDN, LDN  Lead Clinical Dietitian  RD Office Phone [phone]

## 2021-05-30 LAB
ABSOLUTE EOS #: 0 K/UL (ref 0–0.4)
ABSOLUTE IMMATURE GRANULOCYTE: 0.29 K/UL (ref 0–0.3)
ABSOLUTE LYMPH #: 0.58 K/UL (ref 1–4.8)
ABSOLUTE MONO #: 0.29 K/UL (ref 0.2–0.8)
ALBUMIN SERPL-MCNC: 2.5 G/DL (ref 3.5–5.2)
ALBUMIN/GLOBULIN RATIO: ABNORMAL (ref 1–2.5)
ALP BLD-CCNC: 75 U/L (ref 35–104)
ALT SERPL-CCNC: 8 U/L (ref 5–33)
ANION GAP SERPL CALCULATED.3IONS-SCNC: 9 MMOL/L (ref 9–17)
AST SERPL-CCNC: 9 U/L
BASOPHILS # BLD: 0 %
BASOPHILS ABSOLUTE: 0 K/UL (ref 0–0.2)
BILIRUB SERPL-MCNC: 0.25 MG/DL (ref 0.3–1.2)
BILIRUBIN DIRECT: 0.09 MG/DL
BILIRUBIN, INDIRECT: 0.16 MG/DL (ref 0–1)
BUN BLDV-MCNC: 18 MG/DL (ref 8–23)
BUN/CREAT BLD: 17 (ref 9–20)
CALCIUM SERPL-MCNC: 6.7 MG/DL (ref 8.6–10.4)
CHLORIDE BLD-SCNC: 104 MMOL/L (ref 98–107)
CO2: 24 MMOL/L (ref 20–31)
CREAT SERPL-MCNC: 1.07 MG/DL (ref 0.5–0.9)
DIFFERENTIAL TYPE: ABNORMAL
EOSINOPHILS RELATIVE PERCENT: 0 % (ref 1–4)
GFR AFRICAN AMERICAN: >60 ML/MIN
GFR NON-AFRICAN AMERICAN: 50 ML/MIN
GFR SERPL CREATININE-BSD FRML MDRD: ABNORMAL ML/MIN/{1.73_M2}
GFR SERPL CREATININE-BSD FRML MDRD: ABNORMAL ML/MIN/{1.73_M2}
GLOBULIN: ABNORMAL G/DL (ref 1.5–3.8)
GLUCOSE BLD-MCNC: 147 MG/DL (ref 65–105)
GLUCOSE BLD-MCNC: 207 MG/DL (ref 70–99)
GLUCOSE BLD-MCNC: 220 MG/DL (ref 65–105)
GLUCOSE BLD-MCNC: 221 MG/DL (ref 65–105)
GLUCOSE BLD-MCNC: 277 MG/DL (ref 65–105)
HAV IGM SER IA-ACNC: NONREACTIVE
HCT VFR BLD CALC: 19.8 % (ref 36.3–47.1)
HCT VFR BLD CALC: 23.1 % (ref 36.3–47.1)
HCT VFR BLD CALC: 25.9 % (ref 36.3–47.1)
HCT VFR BLD CALC: 26.7 % (ref 36.3–47.1)
HEMOGLOBIN: 6.2 G/DL (ref 11.9–15.1)
HEMOGLOBIN: 7.5 G/DL (ref 11.9–15.1)
HEMOGLOBIN: 8.3 G/DL (ref 11.9–15.1)
HEMOGLOBIN: 8.5 G/DL (ref 11.9–15.1)
HEPATITIS B CORE IGM ANTIBODY: NONREACTIVE
HEPATITIS B SURFACE ANTIGEN: NONREACTIVE
HEPATITIS C ANTIBODY: NONREACTIVE
IMMATURE GRANULOCYTES: 2 %
IRON SATURATION: 12 % (ref 20–55)
IRON: 30 UG/DL (ref 37–145)
LIPASE: 16 U/L (ref 13–60)
LYMPHOCYTES # BLD: 4 % (ref 24–44)
MCH RBC QN AUTO: 28.7 PG (ref 25.2–33.5)
MCHC RBC AUTO-ENTMCNC: 32.5 G/DL (ref 28.4–34.8)
MCV RBC AUTO: 88.5 FL (ref 82.6–102.9)
MONOCYTES # BLD: 2 % (ref 1–7)
MORPHOLOGY: ABNORMAL
MORPHOLOGY: ABNORMAL
NRBC AUTOMATED: 0.6 PER 100 WBC
PDW BLD-RTO: 15.9 % (ref 11.8–14.4)
PLATELET # BLD: 256 K/UL (ref 138–453)
PLATELET ESTIMATE: ABNORMAL
PMV BLD AUTO: 10.2 FL (ref 8.1–13.5)
POTASSIUM SERPL-SCNC: 3.8 MMOL/L (ref 3.7–5.3)
RBC # BLD: 2.61 M/UL (ref 3.95–5.11)
RBC # BLD: ABNORMAL 10*6/UL
SEG NEUTROPHILS: 92 % (ref 36–66)
SEGMENTED NEUTROPHILS ABSOLUTE COUNT: 13.24 K/UL (ref 1.8–7.7)
SODIUM BLD-SCNC: 137 MMOL/L (ref 135–144)
TOTAL IRON BINDING CAPACITY: 249 UG/DL (ref 250–450)
TOTAL PROTEIN: 4.3 G/DL (ref 6.4–8.3)
UNSATURATED IRON BINDING CAPACITY: 219 UG/DL (ref 112–347)
WBC # BLD: 14.4 K/UL (ref 3.5–11.3)
WBC # BLD: ABNORMAL 10*3/UL

## 2021-05-30 PROCEDURE — 6360000002 HC RX W HCPCS: Performed by: INTERNAL MEDICINE

## 2021-05-30 PROCEDURE — 94761 N-INVAS EAR/PLS OXIMETRY MLT: CPT

## 2021-05-30 PROCEDURE — 85014 HEMATOCRIT: CPT

## 2021-05-30 PROCEDURE — 80048 BASIC METABOLIC PNL TOTAL CA: CPT

## 2021-05-30 PROCEDURE — 82947 ASSAY GLUCOSE BLOOD QUANT: CPT

## 2021-05-30 PROCEDURE — 6370000000 HC RX 637 (ALT 250 FOR IP): Performed by: NURSE PRACTITIONER

## 2021-05-30 PROCEDURE — P9017 PLASMA 1 DONOR FRZ W/IN 8 HR: HCPCS

## 2021-05-30 PROCEDURE — 2580000003 HC RX 258: Performed by: RADIOLOGY

## 2021-05-30 PROCEDURE — 86900 BLOOD TYPING SEROLOGIC ABO: CPT

## 2021-05-30 PROCEDURE — 99232 SBSQ HOSP IP/OBS MODERATE 35: CPT | Performed by: INTERNAL MEDICINE

## 2021-05-30 PROCEDURE — 83550 IRON BINDING TEST: CPT

## 2021-05-30 PROCEDURE — 83690 ASSAY OF LIPASE: CPT

## 2021-05-30 PROCEDURE — 80076 HEPATIC FUNCTION PANEL: CPT

## 2021-05-30 PROCEDURE — 94640 AIRWAY INHALATION TREATMENT: CPT

## 2021-05-30 PROCEDURE — 6370000000 HC RX 637 (ALT 250 FOR IP): Performed by: INTERNAL MEDICINE

## 2021-05-30 PROCEDURE — 86927 PLASMA FRESH FROZEN: CPT

## 2021-05-30 PROCEDURE — 85025 COMPLETE CBC W/AUTO DIFF WBC: CPT

## 2021-05-30 PROCEDURE — P9016 RBC LEUKOCYTES REDUCED: HCPCS

## 2021-05-30 PROCEDURE — 83516 IMMUNOASSAY NONANTIBODY: CPT

## 2021-05-30 PROCEDURE — 80074 ACUTE HEPATITIS PANEL: CPT

## 2021-05-30 PROCEDURE — 85018 HEMOGLOBIN: CPT

## 2021-05-30 PROCEDURE — 99233 SBSQ HOSP IP/OBS HIGH 50: CPT | Performed by: INTERNAL MEDICINE

## 2021-05-30 PROCEDURE — 1200000000 HC SEMI PRIVATE

## 2021-05-30 PROCEDURE — 83540 ASSAY OF IRON: CPT

## 2021-05-30 RX ORDER — INSULIN GLARGINE 100 [IU]/ML
14 INJECTION, SOLUTION SUBCUTANEOUS 2 TIMES DAILY
Status: DISCONTINUED | OUTPATIENT
Start: 2021-05-30 | End: 2021-05-30

## 2021-05-30 RX ORDER — INSULIN GLARGINE 100 [IU]/ML
16 INJECTION, SOLUTION SUBCUTANEOUS 2 TIMES DAILY
Status: DISCONTINUED | OUTPATIENT
Start: 2021-05-30 | End: 2021-05-30

## 2021-05-30 RX ORDER — SODIUM CHLORIDE 9 MG/ML
INJECTION, SOLUTION INTRAVENOUS PRN
Status: DISCONTINUED | OUTPATIENT
Start: 2021-05-30 | End: 2021-05-30 | Stop reason: SDUPTHER

## 2021-05-30 RX ORDER — SODIUM CHLORIDE 9 MG/ML
INJECTION, SOLUTION INTRAVENOUS PRN
Status: DISCONTINUED | OUTPATIENT
Start: 2021-05-30 | End: 2021-06-03 | Stop reason: SDUPTHER

## 2021-05-30 RX ADMIN — FUROSEMIDE 40 MG: 40 TABLET ORAL at 17:58

## 2021-05-30 RX ADMIN — METHYLPREDNISOLONE SODIUM SUCCINATE 40 MG: 40 INJECTION, POWDER, FOR SOLUTION INTRAMUSCULAR; INTRAVENOUS at 08:08

## 2021-05-30 RX ADMIN — TIZANIDINE 2 MG: 2 TABLET ORAL at 08:09

## 2021-05-30 RX ADMIN — MONTELUKAST 10 MG: 10 TABLET, FILM COATED ORAL at 21:39

## 2021-05-30 RX ADMIN — METHYLPREDNISOLONE SODIUM SUCCINATE 40 MG: 40 INJECTION, POWDER, FOR SOLUTION INTRAMUSCULAR; INTRAVENOUS at 17:58

## 2021-05-30 RX ADMIN — Medication 1 MG: at 12:39

## 2021-05-30 RX ADMIN — ONDANSETRON 4 MG: 2 INJECTION INTRAMUSCULAR; INTRAVENOUS at 06:41

## 2021-05-30 RX ADMIN — PANTOPRAZOLE SODIUM 40 MG: 40 TABLET, DELAYED RELEASE ORAL at 06:11

## 2021-05-30 RX ADMIN — Medication 2000 UNITS: at 17:58

## 2021-05-30 RX ADMIN — METHYLPREDNISOLONE SODIUM SUCCINATE 40 MG: 40 INJECTION, POWDER, FOR SOLUTION INTRAMUSCULAR; INTRAVENOUS at 00:31

## 2021-05-30 RX ADMIN — FLUTICASONE PROPIONATE 1 SPRAY: 50 SPRAY, METERED NASAL at 08:09

## 2021-05-30 RX ADMIN — ATORVASTATIN CALCIUM 40 MG: 40 TABLET, FILM COATED ORAL at 21:42

## 2021-05-30 RX ADMIN — DULOXETINE HYDROCHLORIDE 90 MG: 60 CAPSULE, DELAYED RELEASE ORAL at 08:09

## 2021-05-30 RX ADMIN — MAGNESIUM GLUCONATE 500 MG ORAL TABLET 400 MG: 500 TABLET ORAL at 08:09

## 2021-05-30 RX ADMIN — BUDESONIDE AND FORMOTEROL FUMARATE DIHYDRATE 1 PUFF: 160; 4.5 AEROSOL RESPIRATORY (INHALATION) at 11:13

## 2021-05-30 RX ADMIN — HYDROXYZINE HYDROCHLORIDE 25 MG: 25 TABLET, FILM COATED ORAL at 08:09

## 2021-05-30 RX ADMIN — FERROUS SULFATE TAB EC 325 MG (65 MG FE EQUIVALENT) 325 MG: 325 (65 FE) TABLET DELAYED RESPONSE at 17:58

## 2021-05-30 RX ADMIN — FUROSEMIDE 40 MG: 40 TABLET ORAL at 08:08

## 2021-05-30 RX ADMIN — BUPROPION HYDROCHLORIDE 150 MG: 150 TABLET, EXTENDED RELEASE ORAL at 08:09

## 2021-05-30 RX ADMIN — NORTRIPTYLINE HYDROCHLORIDE 10 MG: 10 CAPSULE ORAL at 21:39

## 2021-05-30 RX ADMIN — Medication 1 MG: at 04:51

## 2021-05-30 RX ADMIN — ALBUTEROL SULFATE 2.5 MG: 2.5 SOLUTION RESPIRATORY (INHALATION) at 11:11

## 2021-05-30 RX ADMIN — TIZANIDINE 2 MG: 2 TABLET ORAL at 21:39

## 2021-05-30 RX ADMIN — HYDROXYZINE HYDROCHLORIDE 25 MG: 25 TABLET, FILM COATED ORAL at 21:42

## 2021-05-30 RX ADMIN — MAGNESIUM GLUCONATE 500 MG ORAL TABLET 400 MG: 500 TABLET ORAL at 21:42

## 2021-05-30 RX ADMIN — ALBUTEROL SULFATE 2.5 MG: 2.5 SOLUTION RESPIRATORY (INHALATION) at 15:23

## 2021-05-30 RX ADMIN — BUDESONIDE AND FORMOTEROL FUMARATE DIHYDRATE 1 PUFF: 160; 4.5 AEROSOL RESPIRATORY (INHALATION) at 20:49

## 2021-05-30 RX ADMIN — INSULIN LISPRO 4 UNITS: 100 INJECTION, SOLUTION INTRAVENOUS; SUBCUTANEOUS at 17:58

## 2021-05-30 RX ADMIN — Medication 1 MG: at 08:19

## 2021-05-30 RX ADMIN — Medication 1 MG: at 21:39

## 2021-05-30 RX ADMIN — TIOTROPIUM BROMIDE INHALATION SPRAY 2 PUFF: 3.12 SPRAY, METERED RESPIRATORY (INHALATION) at 11:13

## 2021-05-30 RX ADMIN — INSULIN LISPRO 6 UNITS: 100 INJECTION, SOLUTION INTRAVENOUS; SUBCUTANEOUS at 12:38

## 2021-05-30 RX ADMIN — CETIRIZINE HYDROCHLORIDE 10 MG: 10 TABLET, FILM COATED ORAL at 08:09

## 2021-05-30 RX ADMIN — SODIUM CHLORIDE, PRESERVATIVE FREE 10 ML: 5 INJECTION INTRAVENOUS at 08:09

## 2021-05-30 RX ADMIN — ALBUTEROL SULFATE 2.5 MG: 2.5 SOLUTION RESPIRATORY (INHALATION) at 20:45

## 2021-05-30 RX ADMIN — INSULIN LISPRO 6 UNITS: 100 INJECTION, SOLUTION INTRAVENOUS; SUBCUTANEOUS at 08:06

## 2021-05-30 RX ADMIN — MIRTAZAPINE 15 MG: 15 TABLET, FILM COATED ORAL at 21:39

## 2021-05-30 RX ADMIN — LORAZEPAM 0.25 MG: 0.5 TABLET ORAL at 23:36

## 2021-05-30 ASSESSMENT — PAIN SCALES - GENERAL
PAINLEVEL_OUTOF10: 0
PAINLEVEL_OUTOF10: 9
PAINLEVEL_OUTOF10: 9
PAINLEVEL_OUTOF10: 10
PAINLEVEL_OUTOF10: 6
PAINLEVEL_OUTOF10: 8
PAINLEVEL_OUTOF10: 6
PAINLEVEL_OUTOF10: 9

## 2021-05-30 ASSESSMENT — PAIN DESCRIPTION - FREQUENCY
FREQUENCY: CONTINUOUS
FREQUENCY: CONTINUOUS

## 2021-05-30 ASSESSMENT — PAIN DESCRIPTION - PAIN TYPE
TYPE: CHRONIC PAIN
TYPE: CHRONIC PAIN

## 2021-05-30 ASSESSMENT — PAIN DESCRIPTION - LOCATION
LOCATION: GENERALIZED
LOCATION: GENERALIZED

## 2021-05-30 ASSESSMENT — PAIN DESCRIPTION - DESCRIPTORS
DESCRIPTORS: ACHING
DESCRIPTORS: ACHING

## 2021-05-30 NOTE — PROGRESS NOTES
Patient completed 1st unit of blood transfusion and no potential blood reaction was recognized. VS documented. Will continue to monitor.

## 2021-05-30 NOTE — PROGRESS NOTES
Second unit of PRBCs started at 2207, pt observed for first 15 minutes per protocol.  VSS, no suspected reaction at this time

## 2021-05-30 NOTE — PROGRESS NOTES
Pt has had multiple episodes of significant bleeding with clots present over the last 90 minutes. Page out to GI.    0510: Spoke w/Dr. Marcy Haynes, no new orders at this time. H&H checks q 6 hours, PO protonix 40 mg BID, and clear liquid diet already in place.

## 2021-05-30 NOTE — PROGRESS NOTES
Writer attended patient for first 15 min of blood transfusion and no potential blood reaction was recognized. VS documented. Will continue to monitor.

## 2021-05-30 NOTE — PLAN OF CARE
Problem: Falls - Risk of:  Goal: Will remain free from falls  Description: Will remain free from falls  Outcome: Ongoing  Goal: Absence of physical injury  Description: Absence of physical injury  Outcome: Ongoing     Problem: Skin Integrity:  Goal: Will show no infection signs and symptoms  Description: Will show no infection signs and symptoms  Outcome: Ongoing  Goal: Absence of new skin breakdown  Description: Absence of new skin breakdown  Outcome: Ongoing     Problem: Infection:  Goal: Will remain free from infection  Description: Will remain free from infection  Outcome: Ongoing     Problem: Safety:  Goal: Free from accidental physical injury  Description: Free from accidental physical injury  Outcome: Ongoing  Goal: Free from intentional harm  Description: Free from intentional harm  Outcome: Ongoing     Problem: Daily Care:  Goal: Daily care needs are met  Description: Daily care needs are met  Outcome: Ongoing     Problem: Pain:  Goal: Patient's pain/discomfort is manageable  Description: Patient's pain/discomfort is manageable  Outcome: Ongoing  Goal: Pain level will decrease  Description: Pain level will decrease  Outcome: Ongoing  Goal: Control of acute pain  Description: Control of acute pain  Outcome: Ongoing  Goal: Control of chronic pain  Description: Control of chronic pain  Outcome: Ongoing     Problem: Skin Integrity:  Goal: Skin integrity will stabilize  Description: Skin integrity will stabilize  Outcome: Ongoing     Problem: Discharge Planning:  Goal: Patients continuum of care needs are met  Description: Patients continuum of care needs are met  Outcome: Ongoing     Problem: Nutrition  Goal: Optimal nutrition therapy  Outcome: Ongoing     Problem: Discharge Planning:  Goal: Discharged to appropriate level of care  Description: Discharged to appropriate level of care  Outcome: Ongoing     Problem: Serum Glucose Level - Abnormal:  Goal: Ability to maintain appropriate glucose levels will improve  Description: Ability to maintain appropriate glucose levels will improve  Outcome: Ongoing     Problem: Sensory Perception - Impaired:  Goal: Ability to maintain a stable neurologic state will improve  Description: Ability to maintain a stable neurologic state will improve  Outcome: Ongoing     Problem: IP BALANCE  Goal: LTG - patient will maintain standing balance to allow for completion of daily activities  Outcome: Ongoing     Problem: IP BALANCE  Goal: LTG - Patient will maintain balance to allow for safe/functional mobility  Outcome: Ongoing

## 2021-05-30 NOTE — PROGRESS NOTES
Writer updated patient on plan of care. Hgb recheck at 1330 showed Hgb of .6. 2. Dr. Caryn Griffin notified, orders received to transfuse two units to PRBC's. Dr. Marcellina Lundborg notified as well, orders received to transfuse 2 units of FFP. Patient and daughter updated. Daughter at bedside expressed concerns that her calls for her mother were not reaching writer. Writer assured her that she can call anytime and request information from patient's nurse and that message will be returned promptly. Daughter Analia Raimrez is in emergency contacts ([phone]) and requested that she be notified of any changes in patient condition.

## 2021-05-30 NOTE — PROGRESS NOTES
GI Progress notes    5/30/2021   12:29 PM    Name:  Zenon Narayanan  MRN:    [id number]     Sanket:     [de-identified]   Room:  [address] [place] [address] Day: 5     Admit Date: 5/25/2021 12:33 PM  PCP: Serafin Calhoun DO    Subjective:     C/C:   Chief Complaint   Patient presents with    GI Bleeding       Interval History: Status: worsened. Patient has not started bleeding last night again  [place] bleeding through the rectum was reported  She was managed for blood transfusion  This morning she is doing relatively better  Complains of some abdominal discomfort  No nausea vomiting  Hemoglobin is low but stable now  Has significant anxiety issues  Currently on IV steroids    ROS:  Constitutional: negative for chills, fevers and sweats    Gastrointestinal: negative for abdominal pain, constipation, positive diarrhea, nausea and vomiting      Medications: Allergies:    Allergies   Allergen Reactions    Aspirin     Ibuprofen     Sulfa Antibiotics     Tape Mathew Sarkis Tape]     Lyrica [Pregabalin] Other (See Comments)     \"made her goofy\"       Current Meds: insulin glargine (LANTUS) injection vial 14 Units, BID  methylPREDNISolone sodium (SOLU-MEDROL) injection 40 mg, Q8H  albuterol (PROVENTIL) nebulizer solution 2.5 mg, 4x daily  0.9 % sodium chloride infusion, PRN  sodium chloride flush 0.9 % injection 10 mL, 2 times per day  sodium chloride flush 0.9 % injection 10 mL, PRN  furosemide (LASIX) tablet 40 mg, BID  montelukast (SINGULAIR) tablet 10 mg, Nightly  insulin lispro (HUMALOG) injection vial 0-12 Units, TID WC  insulin lispro (HUMALOG) injection vial 0-6 Units, Nightly  glucose (GLUTOSE) 40 % oral gel 15 g, PRN  dextrose 50 % IV solution, PRN  glucagon (rDNA) injection 1 mg, PRN  dextrose 5 % solution, PRN  sodium chloride flush 0.9 % injection 5-40 mL, Q12H  diphenhydrAMINE (BENADRYL) injection 25 mg, Q6H PRN  HYDROmorphone HCl PF (DILAUDID) injection 1 mg, Q3H PRN  atorvastatin (LIPITOR) tablet 40 mg, Nightly  budesonide-formoterol (SYMBICORT) 160-4.5 MCG/ACT inhaler 1 puff, BID  Vitamin D (CHOLECALCIFEROL) tablet 2,000 Units, Dinner  ferrous sulfate (FE TABS 325) EC tablet 325 mg, Dinner  fluticasone (FLONASE) 50 MCG/ACT nasal spray 1 spray, Daily  hydrOXYzine (ATARAX) tablet 25 mg, BID  loperamide (IMODIUM) capsule 2 mg, PRN  magnesium oxide (MAG-OX) tablet 400 mg, BID  mirtazapine (REMERON) tablet 15 mg, Nightly  pantoprazole (PROTONIX) tablet 40 mg, QAM AC  tiotropium (SPIRIVA RESPIMAT) 2.5 MCG/ACT inhaler 2 puff, Daily  tiZANidine (ZANAFLEX) tablet 2 mg, BID  sodium chloride flush 0.9 % injection 5-40 mL, PRN  0.9 % sodium chloride infusion, PRN  promethazine (PHENERGAN) tablet 12.5 mg, Q6H PRN   Or  ondansetron (ZOFRAN) injection 4 mg, Q6H PRN  acetaminophen (TYLENOL) tablet 650 mg, Q6H PRN   Or  acetaminophen (TYLENOL) suppository 650 mg, Q6H PRN  albuterol sulfate  (90 Base) MCG/ACT inhaler 2 puff, Q6H PRN  buPROPion (WELLBUTRIN XL) extended release tablet 150 mg, QAM  DULoxetine (CYMBALTA) extended release capsule 90 mg, Daily  LORazepam (ATIVAN) tablet 0.25 mg, BID PRN  nortriptyline (PAMELOR) capsule 10 mg, Nightly  cetirizine (ZYRTEC) tablet 10 mg, Daily  [Held by provider] alogliptin (NESINA) tablet 12.5 mg, Daily        Data:     Code Status:  Full Code    Family History   Problem Relation Age of Onset    Heart Failure Mother     Cancer Sister     Cancer Maternal Grandmother     Heart Failure Maternal Grandmother        Social History     Socioeconomic History    Marital status:       Spouse name: Not on file    Number of children: Not on file    Years of education: Not on file    Highest education level: Not on file   Occupational History    Not on file   Tobacco Use    Smoking status: Heavy Tobacco Smoker     Packs/day: 1.00     Years: 40.00     Pack years: 40.00    Smokeless tobacco: Never Used   Substance and Sexual Activity    Alcohol use: No    Drug use: No    Sexual activity: Not on file   Other Topics Concern    Not on file   Social History Narrative    Not on file     Social Determinants of Health     Financial Resource Strain:     Difficulty of Paying Living Expenses:    Food Insecurity:     Worried About Running Out of Food in the Last Year:     [address] in the Last Year:    Transportation Needs:     Lack of Transportation (Medical):  Lack of Transportation (Non-Medical):    Physical Activity:     Days of Exercise per Week:     Minutes of Exercise per Session:    Stress:     Feeling of Stress :    Social Connections:     Frequency of Communication with Friends and Family:     Frequency of Social Gatherings with Friends and Family:     Attends [employer] Services:     Active Member of Clubs or Organizations:     Attends Club or Organization Meetings:     Marital Status:    Intimate Partner Violence:     Fear of Current or Ex-Partner:     Emotionally Abused:     Physically Abused:     Sexually Abused:        Vitals:  /62   Pulse 98   Temp 97.7 °F (36.5 °C) (Oral)   Resp 18   Ht 4' 7\" (1.397 m)   Wt 185 lb 2 oz (84 kg)   SpO2 100%   BMI 43.03 kg/m²   Temp (24hrs), Av.8 °F (36.6 °C), Min:97.2 °F (36.2 °C), Max:98.2 °F (36.8 °C)    Recent Labs     21  1525 21  1925 21  0716 21  1219   POCGLU 299* 239* 277* 220*       I/O (24Hr):     Intake/Output Summary (Last 24 hours) at 2021 1229  Last data filed at 2021 7609  Gross per 24 hour   Intake 1313 ml   Output 2250 ml   Net -937 ml       Labs:      CBC:   Lab Results   Component Value Date    WBC 14.4 2021    RBC 2.61 2021    HGB 7.5 2021    HCT 23.1 2021    MCV 88.5 2021    MCH 28.7 2021    MCHC 32.5 2021    RDW 15.9 2021     2021    MPV 10.2 2021     CBC with Differential:    Lab Results   Component Value Date    WBC 14.4 2021    RBC 2.61 2021    HGB 7.5 2021    HCT 23.1 05/30/2021     05/30/2021    MCV 88.5 05/30/2021    MCH 28.7 05/30/2021    MCHC 32.5 05/30/2021    RDW 15.9 05/30/2021    NRBC 1 04/21/2016    LYMPHOPCT 4 05/30/2021    MONOPCT 2 05/30/2021    MYELOPCT 2 04/21/2016    BASOPCT 0 05/30/2021    MONOSABS 0.29 05/30/2021    LYMPHSABS 0.58 05/30/2021    EOSABS 0.00 05/30/2021    BASOSABS 0.00 05/30/2021    DIFFTYPE NOT REPORTED 05/30/2021     Hemoglobin/Hematocrit:    Lab Results   Component Value Date    HGB 7.5 05/30/2021    HCT 23.1 05/30/2021     CMP:    Lab Results   Component Value Date     05/30/2021    K 3.8 05/30/2021     05/30/2021    CO2 24 05/30/2021    BUN 18 05/30/2021    CREATININE 1.07 05/30/2021    GFRAA >60 05/30/2021    LABGLOM 50 05/30/2021    GLUCOSE 207 05/30/2021    PROT 4.3 05/30/2021    LABALBU 2.5 05/30/2021    CALCIUM 6.7 05/30/2021    BILITOT 0.25 05/30/2021    ALKPHOS 75 05/30/2021    AST 9 05/30/2021    ALT 8 05/30/2021     BMP:    Lab Results   Component Value Date     05/30/2021    K 3.8 05/30/2021     05/30/2021    CO2 24 05/30/2021    BUN 18 05/30/2021    LABALBU 2.5 05/30/2021    CREATININE 1.07 05/30/2021    CALCIUM 6.7 05/30/2021    GFRAA >60 05/30/2021    LABGLOM 50 05/30/2021    GLUCOSE 207 05/30/2021     PT/INR:    Lab Results   Component Value Date    PROTIME 16.1 05/25/2021    INR 1.3 05/25/2021     PTT:    Lab Results   Component Value Date    APTT 16.8 06/22/2017   [APTT}    Physical Examination:        General appearance: alert, obesity cooperative and no distress  Mental Status: oriented to person, place and time and normal very anxious affect    Abdomen: soft,  mild diffuse tender, nondistended, bowel sounds present     Assessment:        Primary Problem  GI (gastrointestinal bleed)     Active Hospital Problems    Diagnosis Date Noted    [facility]olitis ([email or web address] [K51.00]     GI (gastrointestinal bleed) [K92.2] 05/25/2021    CHF (congestive heart failure) ([email or web address] [I50.9]     Anemia due to blood

## 2021-05-30 NOTE — PROGRESS NOTES
Writer was informed by lab that fresh frozen plasma is ready for this patient when she needs to be infused. Patient currently getting 2nd unit of PRBC's at this time. Writer will relay to night shift that once blood is finished infusing, to release FFP orders.

## 2021-05-30 NOTE — PROGRESS NOTES
Writer attended patient for first 15 min of second unit of blood transfusion and no potential blood reaction was recognized. VS documented. Will continue to monitor.

## 2021-05-30 NOTE — PROGRESS NOTES
continues to experience dark red stools mixed with bright red blood and liquid diarrhea.  CT of the abdomen shows pancolitis. GI consulted.      5/26 -patient received blood transfusions. Reportedly going for EGD and colonoscopy today. Stool culture is pending.     5/27 - Little change. Steroids started per GI following scopes. Inflammatory bowel disease with diffuse colitis noted in GI note. Still passing blood and has now required 5 units of blood. GI completed EGD and colonoscopy yesterday and showed diffuse colitis. Patient continues to endorse abdominal discomfort. Steroids started per GI following scopes. Inflammatory bowel disease with diffuse colitis noted in GI note. Still passing blood and has now required 5 units of blood. \"    Review of Systems:     Constitutional:  negative for chills, fevers, sweats  Respiratory: Positive for cough, dyspnea on exertion, shortness of breath, wheezing  Cardiovascular:  negative for chest pain, chest pressure/discomfort, lower extremity edema, palpitations  Gastrointestinal:  negative for constipation, nausea, vomiting, + hematochezia, abdominal pain  Neurological:  negative for dizziness, headache    Medications: Allergies:     Allergies   Allergen Reactions    Aspirin     Ibuprofen     Sulfa Antibiotics     Tape Catia Hood Tape]     Lyrica [Pregabalin] Other (See Comments)     \"made her goofy\"       Current Meds:   Scheduled Meds:    insulin glargine  14 Units Subcutaneous BID    methylPREDNISolone  40 mg Intravenous Q8H    albuterol  2.5 mg Nebulization 4x daily    sodium chloride flush  10 mL Intravenous 2 times per day    furosemide  40 mg Oral BID    montelukast  10 mg Oral Nightly    insulin lispro  0-12 Units Subcutaneous TID WC    insulin lispro  0-6 Units Subcutaneous Nightly    sodium chloride flush  5-40 mL Intravenous Q12H    atorvastatin  40 mg Oral Nightly    budesonide-formoterol  1 puff Inhalation BID    Vitamin D  2,000 Units Oral Dinner    ferrous sulfate  325 mg Oral Dinner    fluticasone  1 spray Nasal Daily    hydrOXYzine  25 mg Oral BID    magnesium oxide  400 mg Oral BID    mirtazapine  15 mg Oral Nightly    pantoprazole  40 mg Oral QAM AC    tiotropium  2 puff Inhalation Daily    tiZANidine  2 mg Oral BID    buPROPion  150 mg Oral QAM    DULoxetine  90 mg Oral Daily    nortriptyline  10 mg Oral Nightly    cetirizine  10 mg Oral Daily    [Held by provider] alogliptin  12.5 mg Oral Daily     Continuous Infusions:    sodium chloride      dextrose      sodium chloride       PRN Meds: sodium chloride, sodium chloride flush, glucose, dextrose, glucagon (rDNA), dextrose, diphenhydrAMINE, HYDROmorphone, loperamide, sodium chloride flush, sodium chloride, promethazine **OR** ondansetron, acetaminophen **OR** acetaminophen, albuterol sulfate HFA, LORazepam    Data:     Past Medical History:   has a past medical history of ASHLEIGH (acute kidney injury) ([facility] Arthritis, Asthma, [facility] CHF (congestive heart failure) ([facility] COPD (chronic obstructive pulmonary disease) ([facility] Depression, Diabetes mellitus ([facility] Fibromyalgia, GERD (gastroesophageal reflux disease), Hyperlipidemia, Hypertension, and Pneumonia. Social History:   reports that she has been smoking. She has a 40.00 pack-year smoking history. She has never used smokeless tobacco. She reports that she does not drink alcohol and does not use drugs.      Family History:   Family History   Problem Relation Age of Onset    Heart Failure Mother     Cancer Sister     Cancer Maternal Grandmother     Heart Failure Maternal Grandmother        Vitals:  BP (!) 107/48   Pulse 94   Temp 97.2 °F (36.2 °C) (Oral)   Resp 20   Ht 4' 7\" (1.397 m)   Wt 185 lb 2 oz (84 kg)   SpO2 100%   BMI 43.03 kg/m²   Temp (24hrs), Av.8 °F (36.6 °C), Min:97.2 °F (36.2 °C), Max:98.2 °F (36.8 °C)    Recent Labs     21  1220 21  1525 21  1925 21  0716 POCGLU 224* 299* 239* 277*       I/O (24Hr):     Intake/Output Summary (Last 24 hours) at 5/30/2021 0845  Last data filed at 5/30/2021 0735  Gross per 24 hour   Intake 1313 ml   Output 2550 ml   Net -1237 ml       Labs:  Hematology:  Recent Labs     05/29/21  0339 05/29/21  1645 05/30/21  0139 05/30/21  0417   WBC 15.3*  --   --  14.4*   RBC 2.83*  --   --  2.61*   HGB 7.9* 6.2* 8.3* 7.5*   HCT 25.6* 20.2* 25.9* 23.1*   MCV 90.5  --   --  88.5   MCH 27.9  --   --  28.7   MCHC 30.9  --   --  32.5   RDW 17.2*  --   --  15.9*     --   --  256   MPV 10.2  --   --  10.2     Chemistry:  Recent Labs     05/28/21  0546 05/29/21  0339    139   K 3.8 3.7   * 110*   CO2 19* 20   GLUCOSE 141* 179*   BUN 16 16   CREATININE 1.34* 1.34*   ANIONGAP 7* 9   LABGLOM 39* 39*   GFRAA 47* 47*   CALCIUM 7.2* 7.5*     Recent Labs     05/28/21  1948 05/29/21  0732 05/29/21  1220 05/29/21  1525 05/29/21  1925 05/30/21  0417 05/30/21  0716   PROT  --   --   --   --   --  4.3*  --    LABALBU  --   --   --   --   --  2.5*  --    AST  --   --   --   --   --  9  --    ALT  --   --   --   --   --  8  --    ALKPHOS  --   --   --   --   --  75  --    BILITOT  --   --   --   --   --  0.25*  --    BILIDIR  --   --   --   --   --  0.09  --    LIPASE  --   --   --   --   --  16  --    POCGLU 144* 138* 224* 299* 239*  --  277*     ABG:  Lab Results   Component Value Date    POCPH 7.26 12/31/2016    POCPCO2 56 12/31/2016    POCPO2 80 12/31/2016    POCHCO3 25.3 12/31/2016    NBEA 2 12/31/2016    PBEA NOT REPORTED 12/31/2016    FZR1VGK 27 12/31/2016    RPHN2UUQ 93 12/31/2016    FIO2 NOT REPORTED 12/31/2016     Lab Results   Component Value Date/Time    SPECIAL NOT REPORTED 06/26/2017 10:08 PM     Lab Results   Component Value Date/Time    CULTURE ESCHERICHIA COLI 50 to 100,000 CFU/ML (A) 06/26/2017 10:08 PM    CULTURE  06/26/2017 10:08 PM     [facility] [id number] [place] [address], [address] [place] [address] [phone]       Radiology:  CT ABDOMEN PELVIS W IV CONTRAST Additional Contrast? None    Result Date: 5/25/2021  Acute nonspecific pancolitis. Infectious or inflammatory colitis can be considered, less likely ischemic since the major visceral arteries appear patent. XR CHEST PORTABLE    Result Date: 5/26/2021  Subtle bibasilar infiltrates representing atelectasis versus pneumonia. IR INSERT PICC VAD W SQ PORT >5 YEARS    Result Date: 5/28/2021  Successful ultrasound and fluoroscopy guided right basilic vein 5 Trin[place]adian power injectable dual-lumen PICC placement. Ready for use. Physical Examination:        General appearance:  alert, cooperative and upset, pale  Mental Status:  oriented to person, place and time and anxious affect  Lungs:  Clear BS anteriorly,  reduced at bases, normal effort  Heart:  regular rate and rhythm, no murmur  Abdomen:  soft, nontender, nondistended, normal bowel sounds, no masses, hepatomegaly, splenomegaly  Extremities:  trace pedal edema, no redness, tenderness in the calves  Skin:  no gross lesions, rashes, induration    Assessment:        Hospital Problems         Last Modified POA    * (Principal) GI (gastrointestinal bleed) 5/25/2021 Yes    Chronic obstructive pulmonary disease with acute exacerbation ([address] 5/25/2021 Yes    ASHLEIGH (acute kidney injury) ([address] 5/25/2021 Yes    Type 2 diabetes mellitus without complication, without long-term current use of insulin ([address] 5/25/2021 Yes    Bipolar 1 disorder ([address] 5/25/2021 Yes    BRBPR (bright red blood per rectum) 5/25/2021 Yes    Acute colitis 5/25/2021 Yes    Anemia due to blood loss, acute 5/25/2021 Yes    CHF (congestive heart failure) ([address] 5/25/2021 Yes    Pancolitis ([address] 5/26/2021 Yes          Plan:        Acute blood loss anemia secondary to GI bleed-hemoglobin 6.2,  transfuse 2 more units PRBCs, monitor H/H q6hrs. Found to have diffuse colitis, IV steroids initiated by GI for inflammatory bowel disease. Patient has received 7-8 units so far.   Diet is clear liquids  DM2-hold Nesina, increase Lantus 16 units BID, SSI  COPD/asthma- Con't IV solumedrol 40 mg q8 hrs, patient asking for breathing treatment, continue Symbicort, Singulair, Zyrtec, schedule albuterol nebulizer every 6 hours,chest x-ray- improved  CHF-concern for volume overload, Con't Lasix 40 mg p.o. twice daily, monitor output, external Altamirano catheter  Bipolar disorder-stable  Atrial fibrillation-heart rate controlled, GI recommends against any blood thinners at this point due to her continued bleeding  GI prophylaxis      Discussed with nurse, pt and daughter       Aaliyah Cohen MD  5/30/2021  8:45 AM

## 2021-05-31 LAB
-: NORMAL
ANION GAP SERPL CALCULATED.3IONS-SCNC: 9 MMOL/L (ref 9–17)
BUN BLDV-MCNC: 26 MG/DL (ref 8–23)
BUN/CREAT BLD: 13 (ref 9–20)
CALCIUM SERPL-MCNC: 6.7 MG/DL (ref 8.6–10.4)
CHLORIDE BLD-SCNC: 102 MMOL/L (ref 98–107)
CO2: 25 MMOL/L (ref 20–31)
CREAT SERPL-MCNC: 1.98 MG/DL (ref 0.5–0.9)
GFR AFRICAN AMERICAN: 30 ML/MIN
GFR NON-AFRICAN AMERICAN: 25 ML/MIN
GFR SERPL CREATININE-BSD FRML MDRD: ABNORMAL ML/MIN/{1.73_M2}
GFR SERPL CREATININE-BSD FRML MDRD: ABNORMAL ML/MIN/{1.73_M2}
GLUCOSE BLD-MCNC: 142 MG/DL (ref 65–105)
GLUCOSE BLD-MCNC: 143 MG/DL (ref 65–105)
GLUCOSE BLD-MCNC: 143 MG/DL (ref 70–99)
GLUCOSE BLD-MCNC: 173 MG/DL (ref 65–105)
GLUCOSE BLD-MCNC: 179 MG/DL (ref 65–105)
HCT VFR BLD CALC: 19 % (ref 36.3–47.1)
HCT VFR BLD CALC: 23.9 % (ref 36.3–47.1)
HCT VFR BLD CALC: 24 % (ref 36.3–47.1)
HEMOGLOBIN: 6.1 G/DL (ref 11.9–15.1)
HEMOGLOBIN: 7.8 G/DL (ref 11.9–15.1)
HEMOGLOBIN: 7.8 G/DL (ref 11.9–15.1)
INR BLD: 1.3
PARTIAL THROMBOPLASTIN TIME: 22.3 SEC (ref 23.9–33.8)
POTASSIUM SERPL-SCNC: 3.8 MMOL/L (ref 3.7–5.3)
PROTHROMBIN TIME: 15.8 SEC (ref 11.5–14.2)
REASON FOR REJECTION: NORMAL
SODIUM BLD-SCNC: 136 MMOL/L (ref 135–144)
ZZ NTE CLEAN UP: ORDERED TEST: NORMAL
ZZ NTE WITH NAME CLEAN UP: SPECIMEN SOURCE: NORMAL

## 2021-05-31 PROCEDURE — 99232 SBSQ HOSP IP/OBS MODERATE 35: CPT | Performed by: INTERNAL MEDICINE

## 2021-05-31 PROCEDURE — 6370000000 HC RX 637 (ALT 250 FOR IP): Performed by: INTERNAL MEDICINE

## 2021-05-31 PROCEDURE — 2580000003 HC RX 258: Performed by: RADIOLOGY

## 2021-05-31 PROCEDURE — 6360000002 HC RX W HCPCS: Performed by: INTERNAL MEDICINE

## 2021-05-31 PROCEDURE — 85014 HEMATOCRIT: CPT

## 2021-05-31 PROCEDURE — 86927 PLASMA FRESH FROZEN: CPT

## 2021-05-31 PROCEDURE — 6370000000 HC RX 637 (ALT 250 FOR IP): Performed by: NURSE PRACTITIONER

## 2021-05-31 PROCEDURE — 94761 N-INVAS EAR/PLS OXIMETRY MLT: CPT

## 2021-05-31 PROCEDURE — 86900 BLOOD TYPING SEROLOGIC ABO: CPT

## 2021-05-31 PROCEDURE — 85018 HEMOGLOBIN: CPT

## 2021-05-31 PROCEDURE — 2700000000 HC OXYGEN THERAPY PER DAY

## 2021-05-31 PROCEDURE — 2580000003 HC RX 258: Performed by: NURSE PRACTITIONER

## 2021-05-31 PROCEDURE — 94760 N-INVAS EAR/PLS OXIMETRY 1: CPT

## 2021-05-31 PROCEDURE — 99233 SBSQ HOSP IP/OBS HIGH 50: CPT | Performed by: INTERNAL MEDICINE

## 2021-05-31 PROCEDURE — P9017 PLASMA 1 DONOR FRZ W/IN 8 HR: HCPCS

## 2021-05-31 PROCEDURE — 82947 ASSAY GLUCOSE BLOOD QUANT: CPT

## 2021-05-31 PROCEDURE — 85610 PROTHROMBIN TIME: CPT

## 2021-05-31 PROCEDURE — 85730 THROMBOPLASTIN TIME PARTIAL: CPT

## 2021-05-31 PROCEDURE — 1200000000 HC SEMI PRIVATE

## 2021-05-31 PROCEDURE — 80048 BASIC METABOLIC PNL TOTAL CA: CPT

## 2021-05-31 PROCEDURE — 94640 AIRWAY INHALATION TREATMENT: CPT

## 2021-05-31 PROCEDURE — P9016 RBC LEUKOCYTES REDUCED: HCPCS

## 2021-05-31 RX ORDER — SODIUM CHLORIDE 9 MG/ML
INJECTION, SOLUTION INTRAVENOUS PRN
Status: DISCONTINUED | OUTPATIENT
Start: 2021-05-31 | End: 2021-06-03 | Stop reason: SDUPTHER

## 2021-05-31 RX ORDER — HYDROXYZINE HYDROCHLORIDE 10 MG/1
10 TABLET, FILM COATED ORAL ONCE
Status: COMPLETED | OUTPATIENT
Start: 2021-05-31 | End: 2021-05-31

## 2021-05-31 RX ADMIN — SODIUM CHLORIDE, PRESERVATIVE FREE 10 ML: 5 INJECTION INTRAVENOUS at 09:27

## 2021-05-31 RX ADMIN — METHYLPREDNISOLONE SODIUM SUCCINATE 40 MG: 40 INJECTION, POWDER, FOR SOLUTION INTRAMUSCULAR; INTRAVENOUS at 09:26

## 2021-05-31 RX ADMIN — MONTELUKAST 10 MG: 10 TABLET, FILM COATED ORAL at 19:43

## 2021-05-31 RX ADMIN — ALBUTEROL SULFATE 2.5 MG: 2.5 SOLUTION RESPIRATORY (INHALATION) at 14:16

## 2021-05-31 RX ADMIN — METHYLPREDNISOLONE SODIUM SUCCINATE 40 MG: 40 INJECTION, POWDER, FOR SOLUTION INTRAMUSCULAR; INTRAVENOUS at 01:00

## 2021-05-31 RX ADMIN — PANTOPRAZOLE SODIUM 40 MG: 40 TABLET, DELAYED RELEASE ORAL at 06:41

## 2021-05-31 RX ADMIN — TIOTROPIUM BROMIDE INHALATION SPRAY 2 PUFF: 3.12 SPRAY, METERED RESPIRATORY (INHALATION) at 06:27

## 2021-05-31 RX ADMIN — HYDROXYZINE HYDROCHLORIDE 25 MG: 25 TABLET, FILM COATED ORAL at 19:43

## 2021-05-31 RX ADMIN — Medication 1 MG: at 17:57

## 2021-05-31 RX ADMIN — ATORVASTATIN CALCIUM 40 MG: 40 TABLET, FILM COATED ORAL at 19:43

## 2021-05-31 RX ADMIN — FERROUS SULFATE TAB EC 325 MG (65 MG FE EQUIVALENT) 325 MG: 325 (65 FE) TABLET DELAYED RESPONSE at 17:37

## 2021-05-31 RX ADMIN — DULOXETINE HYDROCHLORIDE 90 MG: 60 CAPSULE, DELAYED RELEASE ORAL at 09:27

## 2021-05-31 RX ADMIN — TIZANIDINE 2 MG: 2 TABLET ORAL at 09:27

## 2021-05-31 RX ADMIN — Medication 1 MG: at 23:58

## 2021-05-31 RX ADMIN — ALBUTEROL SULFATE 2.5 MG: 2.5 SOLUTION RESPIRATORY (INHALATION) at 18:10

## 2021-05-31 RX ADMIN — FLUTICASONE PROPIONATE 1 SPRAY: 50 SPRAY, METERED NASAL at 09:27

## 2021-05-31 RX ADMIN — METHYLPREDNISOLONE SODIUM SUCCINATE 40 MG: 40 INJECTION, POWDER, FOR SOLUTION INTRAMUSCULAR; INTRAVENOUS at 17:38

## 2021-05-31 RX ADMIN — MAGNESIUM GLUCONATE 500 MG ORAL TABLET 400 MG: 500 TABLET ORAL at 09:27

## 2021-05-31 RX ADMIN — METHYLPREDNISOLONE SODIUM SUCCINATE 40 MG: 40 INJECTION, POWDER, FOR SOLUTION INTRAMUSCULAR; INTRAVENOUS at 23:54

## 2021-05-31 RX ADMIN — ALBUTEROL SULFATE 2.5 MG: 2.5 SOLUTION RESPIRATORY (INHALATION) at 06:23

## 2021-05-31 RX ADMIN — MIRTAZAPINE 15 MG: 15 TABLET, FILM COATED ORAL at 19:43

## 2021-05-31 RX ADMIN — TIZANIDINE 2 MG: 2 TABLET ORAL at 19:43

## 2021-05-31 RX ADMIN — HYDROXYZINE HYDROCHLORIDE 25 MG: 25 TABLET, FILM COATED ORAL at 09:27

## 2021-05-31 RX ADMIN — BUDESONIDE AND FORMOTEROL FUMARATE DIHYDRATE 1 PUFF: 160; 4.5 AEROSOL RESPIRATORY (INHALATION) at 06:27

## 2021-05-31 RX ADMIN — BUPROPION HYDROCHLORIDE 150 MG: 150 TABLET, EXTENDED RELEASE ORAL at 09:27

## 2021-05-31 RX ADMIN — Medication 2000 UNITS: at 17:38

## 2021-05-31 RX ADMIN — Medication 1 MG: at 12:26

## 2021-05-31 RX ADMIN — MAGNESIUM GLUCONATE 500 MG ORAL TABLET 400 MG: 500 TABLET ORAL at 19:43

## 2021-05-31 RX ADMIN — NORTRIPTYLINE HYDROCHLORIDE 10 MG: 10 CAPSULE ORAL at 19:43

## 2021-05-31 RX ADMIN — Medication 1 MG: at 02:47

## 2021-05-31 RX ADMIN — CETIRIZINE HYDROCHLORIDE 10 MG: 10 TABLET, FILM COATED ORAL at 09:27

## 2021-05-31 RX ADMIN — BUDESONIDE AND FORMOTEROL FUMARATE DIHYDRATE 1 PUFF: 160; 4.5 AEROSOL RESPIRATORY (INHALATION) at 18:11

## 2021-05-31 RX ADMIN — SODIUM CHLORIDE, PRESERVATIVE FREE 10 ML: 5 INJECTION INTRAVENOUS at 21:54

## 2021-05-31 RX ADMIN — INSULIN LISPRO 2 UNITS: 100 INJECTION, SOLUTION INTRAVENOUS; SUBCUTANEOUS at 12:48

## 2021-05-31 RX ADMIN — Medication 1 MG: at 07:39

## 2021-05-31 RX ADMIN — SODIUM CHLORIDE, PRESERVATIVE FREE 10 ML: 5 INJECTION INTRAVENOUS at 19:42

## 2021-05-31 RX ADMIN — HYDROXYZINE HYDROCHLORIDE 10 MG: 10 TABLET, FILM COATED ORAL at 06:18

## 2021-05-31 ASSESSMENT — PAIN SCALES - GENERAL
PAINLEVEL_OUTOF10: 9
PAINLEVEL_OUTOF10: 8
PAINLEVEL_OUTOF10: 6
PAINLEVEL_OUTOF10: 10
PAINLEVEL_OUTOF10: 7
PAINLEVEL_OUTOF10: 8
PAINLEVEL_OUTOF10: 4
PAINLEVEL_OUTOF10: 8
PAINLEVEL_OUTOF10: 9
PAINLEVEL_OUTOF10: 7
PAINLEVEL_OUTOF10: 8

## 2021-05-31 NOTE — PROGRESS NOTES
GI Progress notes    5/31/2021   1:12 PM    Name:  Amy Pratt  MRN:    [id number]     Arabellalyside:     [de-identified]   Room:  [address] [place] [address] Day: 6     Admit Date: 5/25/2021 12:33 PM  PCP: Efra Aragon DO    Subjective:     C/C:   Chief Complaint   Patient presents with    GI Bleeding       Interval History: Status: worsened. Patient continues to have bleeding  Multiple bloody bowel movements  Drop in hemoglobin  Multiple blood transfusions have been given  Still on steroids  Currently kept n.p.o. Was also given fresh frozen plasma yesterday  Complains of mild abdominal discomfort and cramping  Has nausea but no vomiting      ROS:  Constitutional: negative for chills, fevers and sweats    Gastrointestinal: Mild abdominal pain, constipation, positive diarrhea, nausea and vomiting    Medications: Allergies:    Allergies   Allergen Reactions    Aspirin     Ibuprofen     Sulfa Antibiotics     Tape Khloe Lose Tape]     Lyrica [Pregabalin] Other (See Comments)     \"made her goofy\"       Current Meds: 0.9 % sodium chloride infusion, PRN  0.9 % sodium chloride infusion, PRN  insulin detemir (LEVEMIR) injection pen 10 Units, BID  methylPREDNISolone sodium (SOLU-MEDROL) injection 40 mg, Q8H  albuterol (PROVENTIL) nebulizer solution 2.5 mg, 4x daily  sodium chloride flush 0.9 % injection 10 mL, 2 times per day  sodium chloride flush 0.9 % injection 10 mL, PRN  [Held by provider] furosemide (LASIX) tablet 40 mg, BID  montelukast (SINGULAIR) tablet 10 mg, Nightly  insulin lispro (HUMALOG) injection vial 0-12 Units, TID WC  insulin lispro (HUMALOG) injection vial 0-6 Units, Nightly  glucose (GLUTOSE) 40 % oral gel 15 g, PRN  dextrose 50 % IV solution, PRN  glucagon (rDNA) injection 1 mg, PRN  dextrose 5 % solution, PRN  sodium chloride flush 0.9 % injection 5-40 mL, Q12H  HYDROmorphone HCl PF (DILAUDID) injection 1 mg, Q3H PRN  atorvastatin (LIPITOR) tablet 40 mg, Nightly  budesonide-formoterol (SYMBICORT) 160-4.5 MCG/ACT inhaler 1 puff, BID  Vitamin D (CHOLECALCIFEROL) tablet 2,000 Units, Dinner  ferrous sulfate (FE TABS 325) EC tablet 325 mg, Dinner  fluticasone (FLONASE) 50 MCG/ACT nasal spray 1 spray, Daily  hydrOXYzine (ATARAX) tablet 25 mg, BID  loperamide (IMODIUM) capsule 2 mg, PRN  magnesium oxide (MAG-OX) tablet 400 mg, BID  mirtazapine (REMERON) tablet 15 mg, Nightly  pantoprazole (PROTONIX) tablet 40 mg, QAM AC  tiotropium (SPIRIVA RESPIMAT) 2.5 MCG/ACT inhaler 2 puff, Daily  tiZANidine (ZANAFLEX) tablet 2 mg, BID  sodium chloride flush 0.9 % injection 5-40 mL, PRN  0.9 % sodium chloride infusion, PRN  promethazine (PHENERGAN) tablet 12.5 mg, Q6H PRN   Or  ondansetron (ZOFRAN) injection 4 mg, Q6H PRN  acetaminophen (TYLENOL) tablet 650 mg, Q6H PRN   Or  acetaminophen (TYLENOL) suppository 650 mg, Q6H PRN  albuterol sulfate  (90 Base) MCG/ACT inhaler 2 puff, Q6H PRN  buPROPion (WELLBUTRIN XL) extended release tablet 150 mg, QAM  DULoxetine (CYMBALTA) extended release capsule 90 mg, Daily  LORazepam (ATIVAN) tablet 0.25 mg, BID PRN  nortriptyline (PAMELOR) capsule 10 mg, Nightly  cetirizine (ZYRTEC) tablet 10 mg, Daily  [Held by provider] alogliptin (NESINA) tablet 12.5 mg, Daily        Data:     Code Status:  Full Code    Family History   Problem Relation Age of Onset    Heart Failure Mother     Cancer Sister     Cancer Maternal Grandmother     Heart Failure Maternal Grandmother        Social History     Socioeconomic History    Marital status:       Spouse name: Not on file    Number of children: Not on file    Years of education: Not on file    Highest education level: Not on file   Occupational History    Not on file   Tobacco Use    Smoking status: Heavy Tobacco Smoker     Packs/day: 1.00     Years: 40.00     Pack years: 40.00    Smokeless tobacco: Never Used   Substance and Sexual Activity    Alcohol use: No    Drug use: No    Sexual activity: Not on file   Other Topics Concern    Not on file   Social History Narrative    Not on file     Social Determinants of Health     Financial Resource Strain:     Difficulty of Paying Living Expenses:    Food Insecurity:     Worried About Running Out of Food in the Last Year:     [address] in the Last Year:    Transportation Needs:     Lack of Transportation (Medical):  Lack of Transportation (Non-Medical):    Physical Activity:     Days of Exercise per Week:     Minutes of Exercise per Session:    Stress:     Feeling of Stress :    Social Connections:     Frequency of Communication with Friends and Family:     Frequency of Social Gatherings with Friends and Family:     Attends Mandaen Services:     Active Member of Clubs or Organizations:     Attends Club or Organization Meetings:     Marital Status:    Intimate Partner Violence:     Fear of Current or Ex-Partner:     Emotionally Abused:     Physically Abused:     Sexually Abused:        Vitals:  BP (!) 95/37   Pulse 78   Temp 98.1 °F (36.7 °C)   Resp 20   Ht 4' 7\" (1.397 m)   Wt 185 lb 3 oz (84 kg)   SpO2 96%   BMI 43.04 kg/m²   Temp (24hrs), Av.7 °F (36.5 °C), Min:97.3 °F (36.3 °C), Max:98.1 °F (36.7 °C)    Recent Labs     21  1630 21  1953 21  0724 21  1058   POCGLU 221* 147* 142* 179*       I/O (24Hr):     Intake/Output Summary (Last 24 hours) at 2021 1312  Last data filed at 2021 0919  Gross per 24 hour   Intake 2564.08 ml   Output --   Net 2564.08 ml       Labs:      CBC:   Lab Results   Component Value Date    WBC 14.4 2021    RBC 2.61 2021    HGB 6.1 2021    HCT 19.0 2021    MCV 88.5 2021    MCH 28.7 2021    MCHC 32.5 2021    RDW 15.9 2021     2021    MPV 10.2 2021     CBC with Differential:    Lab Results   Component Value Date    WBC 14.4 2021    RBC 2.61 2021    HGB 6.1 2021    HCT 19.0 2021     2021    MCV  BRBPR (bright red blood per rectum) [K62.5] 06/22/2017    Bipolar 1 disorder ([facility] [F31.9] 02/13/2017    Type 2 diabetes mellitus without complication, without long-term current use of insulin ([facility] [E11.9] 01/01/2017    ASHLEIGH (acute kidney injury) ([facility] [N17.9] 04/16/2016    Chronic obstructive pulmonary disease with acute exacerbation ([address] [J44.1] 04/16/2016     Past Medical History:   Diagnosis Date    ASHLEIGH (acute kidney injury) ([facility] 4/16/2016    Arthritis     Asthma     Bipolar 1 disorder ([facility]     CHF (congestive heart failure) (HCC)     COPD (chronic obstructive pulmonary disease) (HCC)     3 L home O2    Depression     Diabetes mellitus (HCC)     Fibromyalgia     GERD (gastroesophageal reflux disease)     Hyperlipidemia     Hypertension     Pneumonia         Plan:        1. Keep n.p.o. for now except ice chips  2. Continue watch hemoglobin hematocrit and any signs of active bleeding  3. Transfuse if hemoglobin drops below 7  4. Check for C. Difficile  5. Plan to do sigmoidoscopy unprepped and possible upper endoscopy tomorrow morning to reevaluate the persistent bleeding  6. Continue IV hydration maintain electrolytes  7.  This was discussed with primary care physician    Explained to the patient and d/W Nursing Staff  Will F/U with you  Please call or Page for any issues or change in status  Thanks    Electronically signed by Honey Shea MD on 5/31/2021 at 1:12 PM

## 2021-05-31 NOTE — PROGRESS NOTES
Occupational Therapy    DATE: 2021    NAME: Lesvia Morales  MRN: [id number]   : 1945      [facility]  Occupational Therapy Not Seen Note    Patient not available for Occupational Therapy due to:    [] Testing:    [] Hemodialysis    [x] Cancelled by RN: Low Hmg, blood transfusion in progress    []Refusal by Patient:    [] Surgery:     [] Intubation:     [] Pain Medication:    [] Sedation:     [] Spine Precautions :    [] Medical Instability:    [] Other:    NEIL Miranda/L

## 2021-05-31 NOTE — PROGRESS NOTES
Unit 1 of 2 of PRBC started, patient observed for first 15 min no reactions noted, VSS.  Will continue to monitor.

## 2021-05-31 NOTE — PROGRESS NOTES
Unit 1 of 2 of plasma started, patient observed for first 15 min no reactions noted, VSS. Will continue to monitor.

## 2021-05-31 NOTE — PROGRESS NOTES
Second unit of blood started, nurse at pt bedside for first 15 minutes, no signs or symptoms of reaction observed, vss, will continue to monitor

## 2021-05-31 NOTE — PROGRESS NOTES
red blood and liquid diarrhea.  CT of the abdomen shows pancolitis. GI consulted.      5/26 -patient received blood transfusions. Reportedly going for EGD and colonoscopy today. Stool culture is pending.     5/27 - Little change. Steroids started per GI following scopes. Inflammatory bowel disease with diffuse colitis noted in GI note. Still passing blood and has now required 5 units of blood. GI completed EGD and colonoscopy yesterday and showed diffuse colitis. Patient continues to endorse abdominal discomfort. Steroids started per GI following scopes. Inflammatory bowel disease with diffuse colitis noted in GI note. Still passing blood and has now required 5 units of blood. \"    5/28-5/31- Patient has had continued bloody stools with clots and needed a total of 12 units PRBCs transfused. Plan for flex sig and EGD on 6/1 per GI, pt NPO. Review of Systems:     Constitutional:  negative for chills, fevers, sweats  Respiratory: Positive for cough, dyspnea on exertion, shortness of breath, no wheezing  Cardiovascular:  negative for chest pain, chest pressure/discomfort, lower extremity edema, palpitations  Gastrointestinal:  negative for constipation, nausea, vomiting, + hematochezia, abdominal pain  Neurological:  negative for dizziness, headache    Medications: Allergies:     Allergies   Allergen Reactions    Aspirin     Ibuprofen     Sulfa Antibiotics     Tape Ilan See Tape]     Lyrica [Pregabalin] Other (See Comments)     \"made her goofy\"       Current Meds:   Scheduled Meds:    insulin detemir  10 Units Subcutaneous BID    methylPREDNISolone  40 mg Intravenous Q8H    albuterol  2.5 mg Nebulization 4x daily    sodium chloride flush  10 mL Intravenous 2 times per day    [Held by provider] furosemide  40 mg Oral BID    montelukast  10 mg Oral Nightly    insulin lispro  0-12 Units Subcutaneous TID     insulin lispro  0-6 Units Subcutaneous Nightly    sodium chloride flush  5-40 mL Intravenous Q12H    atorvastatin  40 mg Oral Nightly    budesonide-formoterol  1 puff Inhalation BID    Vitamin D  2,000 Units Oral Dinner    ferrous sulfate  325 mg Oral Dinner    fluticasone  1 spray Nasal Daily    hydrOXYzine  25 mg Oral BID    magnesium oxide  400 mg Oral BID    mirtazapine  15 mg Oral Nightly    pantoprazole  40 mg Oral QAM AC    tiotropium  2 puff Inhalation Daily    tiZANidine  2 mg Oral BID    buPROPion  150 mg Oral QAM    DULoxetine  90 mg Oral Daily    nortriptyline  10 mg Oral Nightly    cetirizine  10 mg Oral Daily    [Held by provider] alogliptin  12.5 mg Oral Daily     Continuous Infusions:    sodium chloride      sodium chloride      dextrose      sodium chloride       PRN Meds: sodium chloride, sodium chloride, sodium chloride flush, glucose, dextrose, glucagon (rDNA), dextrose, HYDROmorphone, loperamide, sodium chloride flush, sodium chloride, promethazine **OR** ondansetron, acetaminophen **OR** acetaminophen, albuterol sulfate HFA, LORazepam    Data:     Past Medical History:   has a past medical history of ASHLEIGH (acute kidney injury) ([facility] Arthritis, Asthma, Bipolar 1 disorder ([facility] CHF (congestive heart failure) ([facility] COPD (chronic obstructive pulmonary disease) ([facility] Depression, Diabetes mellitus ([facility] Fibromyalgia, GERD (gastroesophageal reflux disease), Hyperlipidemia, Hypertension, and Pneumonia. Social History:   reports that she has been smoking. She has a 40.00 pack-year smoking history. She has never used smokeless tobacco. She reports that she does not drink alcohol and does not use drugs.      Family History:   Family History   Problem Relation Age of Onset    Heart Failure Mother     Cancer Sister     Cancer Maternal Grandmother     Heart Failure Maternal Grandmother        Vitals:  BP (!) 113/50   Pulse 90   Temp 98 °F (36.7 °C) (Oral)   Resp 16   Ht 4' 7\" (1.397 m)   Wt 185 lb 3 oz (84 kg)   SpO2 95%   BMI 43.04 kg/m²   Temp (24hrs), Av.7 Acute blood loss anemia secondary to GI bleed-hemoglobin 7.8, monitor H/H q6hrs. Found to have diffuse colitis, IV steroids initiated by GI for inflammatory bowel disease. Patient has received 12 units so far. NPO now, repeat flex sig in morning with EGD to rule out UGIB.   Pt is at risk for total colectomy if bleeding doesn't stop, dw Dr. Juan Caballero today  DM2-hold Nesina, hold Lantus for now while NPO, SSI  COPD/asthma- Con't IV solumedrol 40 mg q8 hrs, continue Symbicort, Singulair, Zyrtec, schedule albuterol nebulizer every 6 hours,chest x-ray- improved  CHF-concern for volume overload, Hold Lasix 40 mg p.o. twice daily, monitor output, external Altamirano catheter  ASHLEIGH- Likely from Lasix, will hold this for now  Bipolar disorder-stable  Atrial fibrillation-heart rate controlled, GI recommends against any blood thinners at this point due to her continued bleeding  GI prophylaxis      Discussed with nurse, pt and daughter- Orlin Naylor on the phone, answered questions       Lorraine Rosas MD  5/31/2021  8:21 AM

## 2021-06-01 ENCOUNTER — ANESTHESIA EVENT (OUTPATIENT)
Dept: OPERATING ROOM | Age: 76
DRG: 385 | End: 2021-06-01
Payer: COMMERCIAL

## 2021-06-01 ENCOUNTER — ANESTHESIA (OUTPATIENT)
Dept: OPERATING ROOM | Age: 76
DRG: 385 | End: 2021-06-01
Payer: COMMERCIAL

## 2021-06-01 VITALS
RESPIRATION RATE: 21 BRPM | OXYGEN SATURATION: 100 % | SYSTOLIC BLOOD PRESSURE: 91 MMHG | DIASTOLIC BLOOD PRESSURE: 55 MMHG

## 2021-06-01 PROBLEM — E66.01 MORBID OBESITY WITH BMI OF 40.0-44.9, ADULT (HCC): Status: ACTIVE | Noted: 2021-06-01

## 2021-06-01 LAB
-: NORMAL
ABO/RH: NORMAL
ABSOLUTE EOS #: <0.03 K/UL (ref 0–0.44)
ABSOLUTE IMMATURE GRANULOCYTE: 0.34 K/UL (ref 0–0.3)
ABSOLUTE LYMPH #: 1.06 K/UL (ref 1.1–3.7)
ABSOLUTE MONO #: 0.98 K/UL (ref 0.1–1.2)
ALBUMIN SERPL-MCNC: 2.6 G/DL (ref 3.5–5.2)
ALBUMIN/GLOBULIN RATIO: ABNORMAL (ref 1–2.5)
ALP BLD-CCNC: 72 U/L (ref 35–104)
ALT SERPL-CCNC: 12 U/L (ref 5–33)
ANION GAP SERPL CALCULATED.3IONS-SCNC: 11 MMOL/L (ref 9–17)
ANTIBODY SCREEN: NEGATIVE
ARM BAND NUMBER: NORMAL
AST SERPL-CCNC: 15 U/L
BASOPHILS # BLD: 0 % (ref 0–2)
BASOPHILS ABSOLUTE: 0.03 K/UL (ref 0–0.2)
BILIRUB SERPL-MCNC: 0.34 MG/DL (ref 0.3–1.2)
BLD PROD TYP BPU: NORMAL
BUN BLDV-MCNC: 37 MG/DL (ref 8–23)
BUN/CREAT BLD: 19 (ref 9–20)
CALCIUM SERPL-MCNC: 6.7 MG/DL (ref 8.6–10.4)
CHLORIDE BLD-SCNC: 103 MMOL/L (ref 98–107)
CO2: 22 MMOL/L (ref 20–31)
CREAT SERPL-MCNC: 1.98 MG/DL (ref 0.5–0.9)
CROSSMATCH RESULT: NORMAL
DIFFERENTIAL TYPE: ABNORMAL
DISPENSE STATUS BLOOD BANK: NORMAL
EOSINOPHILS RELATIVE PERCENT: 0 % (ref 1–4)
EXPIRATION DATE: NORMAL
GFR AFRICAN AMERICAN: 30 ML/MIN
GFR NON-AFRICAN AMERICAN: 25 ML/MIN
GFR SERPL CREATININE-BSD FRML MDRD: ABNORMAL ML/MIN/{1.73_M2}
GFR SERPL CREATININE-BSD FRML MDRD: ABNORMAL ML/MIN/{1.73_M2}
GLUCOSE BLD-MCNC: 190 MG/DL (ref 65–105)
GLUCOSE BLD-MCNC: 192 MG/DL (ref 65–105)
GLUCOSE BLD-MCNC: 193 MG/DL (ref 65–105)
GLUCOSE BLD-MCNC: 206 MG/DL (ref 70–99)
GLUCOSE BLD-MCNC: 209 MG/DL (ref 65–105)
GLUCOSE BLD-MCNC: 238 MG/DL (ref 65–105)
GLUCOSE BLD-MCNC: 246 MG/DL (ref 65–105)
HCT VFR BLD CALC: 18.1 % (ref 36.3–47.1)
HCT VFR BLD CALC: 21.9 % (ref 36.3–47.1)
HCT VFR BLD CALC: 22.3 % (ref 36.3–47.1)
HEMOGLOBIN: 5.6 G/DL (ref 11.9–15.1)
HEMOGLOBIN: 7 G/DL (ref 11.9–15.1)
HEMOGLOBIN: 7.1 G/DL (ref 11.9–15.1)
IMMATURE GRANULOCYTES: 2 %
LYMPHOCYTES # BLD: 5 % (ref 24–43)
MCH RBC QN AUTO: 29 PG (ref 25.2–33.5)
MCHC RBC AUTO-ENTMCNC: 31.8 G/DL (ref 28.4–34.8)
MCV RBC AUTO: 91 FL (ref 82.6–102.9)
MONOCYTES # BLD: 5 % (ref 3–12)
NRBC AUTOMATED: 1.1 PER 100 WBC
PDW BLD-RTO: 16.8 % (ref 11.8–14.4)
PLATELET # BLD: 244 K/UL (ref 138–453)
PLATELET ESTIMATE: ABNORMAL
PMV BLD AUTO: 10.4 FL (ref 8.1–13.5)
POTASSIUM SERPL-SCNC: 5.4 MMOL/L (ref 3.7–5.3)
RBC # BLD: 2.45 M/UL (ref 3.95–5.11)
RBC # BLD: ABNORMAL 10*6/UL
REASON FOR REJECTION: NORMAL
SEG NEUTROPHILS: 89 % (ref 36–65)
SEGMENTED NEUTROPHILS ABSOLUTE COUNT: 18.48 K/UL (ref 1.5–8.1)
SODIUM BLD-SCNC: 136 MMOL/L (ref 135–144)
TOTAL PROTEIN: 4.2 G/DL (ref 6.4–8.3)
TRANSFUSION STATUS: NORMAL
UNIT DIVISION: 0
UNIT NUMBER: NORMAL
WBC # BLD: 20.9 K/UL (ref 3.5–11.3)
WBC # BLD: ABNORMAL 10*3/UL
ZZ NTE CLEAN UP: ORDERED TEST: NORMAL
ZZ NTE WITH NAME CLEAN UP: SPECIMEN SOURCE: NORMAL

## 2021-06-01 PROCEDURE — 86901 BLOOD TYPING SEROLOGIC RH(D): CPT

## 2021-06-01 PROCEDURE — 85025 COMPLETE CBC W/AUTO DIFF WBC: CPT

## 2021-06-01 PROCEDURE — 6360000002 HC RX W HCPCS: Performed by: INTERNAL MEDICINE

## 2021-06-01 PROCEDURE — 2709999900 HC NON-CHARGEABLE SUPPLY: Performed by: INTERNAL MEDICINE

## 2021-06-01 PROCEDURE — 43235 EGD DIAGNOSTIC BRUSH WASH: CPT | Performed by: INTERNAL MEDICINE

## 2021-06-01 PROCEDURE — 7100000011 HC PHASE II RECOVERY - ADDTL 15 MIN: Performed by: INTERNAL MEDICINE

## 2021-06-01 PROCEDURE — 0DJD8ZZ INSPECTION OF LOWER INTESTINAL TRACT, VIA NATURAL OR ARTIFICIAL OPENING ENDOSCOPIC: ICD-10-PCS | Performed by: INTERNAL MEDICINE

## 2021-06-01 PROCEDURE — 2580000003 HC RX 258: Performed by: INTERNAL MEDICINE

## 2021-06-01 PROCEDURE — 2700000000 HC OXYGEN THERAPY PER DAY

## 2021-06-01 PROCEDURE — 1200000000 HC SEMI PRIVATE

## 2021-06-01 PROCEDURE — 86920 COMPATIBILITY TEST SPIN: CPT

## 2021-06-01 PROCEDURE — 3609017100 HC EGD: Performed by: INTERNAL MEDICINE

## 2021-06-01 PROCEDURE — 82947 ASSAY GLUCOSE BLOOD QUANT: CPT

## 2021-06-01 PROCEDURE — 6370000000 HC RX 637 (ALT 250 FOR IP): Performed by: INTERNAL MEDICINE

## 2021-06-01 PROCEDURE — 2500000003 HC RX 250 WO HCPCS

## 2021-06-01 PROCEDURE — 36430 TRANSFUSION BLD/BLD COMPNT: CPT

## 2021-06-01 PROCEDURE — 94640 AIRWAY INHALATION TREATMENT: CPT

## 2021-06-01 PROCEDURE — 7100000010 HC PHASE II RECOVERY - FIRST 15 MIN: Performed by: INTERNAL MEDICINE

## 2021-06-01 PROCEDURE — 3700000000 HC ANESTHESIA ATTENDED CARE: Performed by: INTERNAL MEDICINE

## 2021-06-01 PROCEDURE — 85014 HEMATOCRIT: CPT

## 2021-06-01 PROCEDURE — 94660 CPAP INITIATION&MGMT: CPT

## 2021-06-01 PROCEDURE — 99232 SBSQ HOSP IP/OBS MODERATE 35: CPT | Performed by: INTERNAL MEDICINE

## 2021-06-01 PROCEDURE — 80053 COMPREHEN METABOLIC PANEL: CPT

## 2021-06-01 PROCEDURE — 36415 COLL VENOUS BLD VENIPUNCTURE: CPT

## 2021-06-01 PROCEDURE — P9016 RBC LEUKOCYTES REDUCED: HCPCS

## 2021-06-01 PROCEDURE — 85018 HEMOGLOBIN: CPT

## 2021-06-01 PROCEDURE — 3609008400 HC SIGMOIDOSCOPY DIAGNOSTIC: Performed by: INTERNAL MEDICINE

## 2021-06-01 PROCEDURE — 2580000003 HC RX 258

## 2021-06-01 PROCEDURE — 94761 N-INVAS EAR/PLS OXIMETRY MLT: CPT

## 2021-06-01 PROCEDURE — 0DJ08ZZ INSPECTION OF UPPER INTESTINAL TRACT, VIA NATURAL OR ARTIFICIAL OPENING ENDOSCOPIC: ICD-10-PCS | Performed by: INTERNAL MEDICINE

## 2021-06-01 PROCEDURE — 86850 RBC ANTIBODY SCREEN: CPT

## 2021-06-01 PROCEDURE — 45378 DIAGNOSTIC COLONOSCOPY: CPT | Performed by: INTERNAL MEDICINE

## 2021-06-01 PROCEDURE — 6360000002 HC RX W HCPCS

## 2021-06-01 PROCEDURE — 86900 BLOOD TYPING SEROLOGIC ABO: CPT

## 2021-06-01 PROCEDURE — 3700000001 HC ADD 15 MINUTES (ANESTHESIA): Performed by: INTERNAL MEDICINE

## 2021-06-01 RX ORDER — LIDOCAINE HYDROCHLORIDE 10 MG/ML
INJECTION, SOLUTION EPIDURAL; INFILTRATION; INTRACAUDAL; PERINEURAL PRN
Status: DISCONTINUED | OUTPATIENT
Start: 2021-06-01 | End: 2021-06-01 | Stop reason: SDUPTHER

## 2021-06-01 RX ORDER — SODIUM CHLORIDE 9 MG/ML
INJECTION, SOLUTION INTRAVENOUS CONTINUOUS PRN
Status: DISCONTINUED | OUTPATIENT
Start: 2021-06-01 | End: 2021-06-01 | Stop reason: SDUPTHER

## 2021-06-01 RX ORDER — SODIUM CHLORIDE, SODIUM LACTATE, POTASSIUM CHLORIDE, CALCIUM CHLORIDE 600; 310; 30; 20 MG/100ML; MG/100ML; MG/100ML; MG/100ML
INJECTION, SOLUTION INTRAVENOUS CONTINUOUS PRN
Status: DISCONTINUED | OUTPATIENT
Start: 2021-06-01 | End: 2021-06-01

## 2021-06-01 RX ORDER — ONDANSETRON 2 MG/ML
4 INJECTION INTRAMUSCULAR; INTRAVENOUS
Status: DISCONTINUED | OUTPATIENT
Start: 2021-06-01 | End: 2021-06-01

## 2021-06-01 RX ORDER — SODIUM CHLORIDE 9 MG/ML
INJECTION, SOLUTION INTRAVENOUS PRN
Status: DISCONTINUED | OUTPATIENT
Start: 2021-06-01 | End: 2021-06-03 | Stop reason: SDUPTHER

## 2021-06-01 RX ORDER — PROPOFOL 10 MG/ML
INJECTION, EMULSION INTRAVENOUS PRN
Status: DISCONTINUED | OUTPATIENT
Start: 2021-06-01 | End: 2021-06-01 | Stop reason: SDUPTHER

## 2021-06-01 RX ORDER — ALBUTEROL SULFATE 2.5 MG/3ML
2.5 SOLUTION RESPIRATORY (INHALATION) EVERY 6 HOURS PRN
Status: DISCONTINUED | OUTPATIENT
Start: 2021-06-01 | End: 2021-06-05 | Stop reason: HOSPADM

## 2021-06-01 RX ADMIN — INSULIN LISPRO 2 UNITS: 100 INJECTION, SOLUTION INTRAVENOUS; SUBCUTANEOUS at 12:17

## 2021-06-01 RX ADMIN — DULOXETINE HYDROCHLORIDE 90 MG: 60 CAPSULE, DELAYED RELEASE ORAL at 10:35

## 2021-06-01 RX ADMIN — ALBUTEROL SULFATE 2.5 MG: 2.5 SOLUTION RESPIRATORY (INHALATION) at 21:29

## 2021-06-01 RX ADMIN — TIZANIDINE 2 MG: 2 TABLET ORAL at 10:46

## 2021-06-01 RX ADMIN — PROPOFOL 50 MG: 10 INJECTION, EMULSION INTRAVENOUS at 09:10

## 2021-06-01 RX ADMIN — HYDROXYZINE HYDROCHLORIDE 25 MG: 25 TABLET, FILM COATED ORAL at 19:39

## 2021-06-01 RX ADMIN — PROPOFOL 20 MG: 10 INJECTION, EMULSION INTRAVENOUS at 09:16

## 2021-06-01 RX ADMIN — FERROUS SULFATE TAB EC 325 MG (65 MG FE EQUIVALENT) 325 MG: 325 (65 FE) TABLET DELAYED RESPONSE at 16:18

## 2021-06-01 RX ADMIN — MIRTAZAPINE 15 MG: 15 TABLET, FILM COATED ORAL at 19:38

## 2021-06-01 RX ADMIN — BUDESONIDE AND FORMOTEROL FUMARATE DIHYDRATE 1 PUFF: 160; 4.5 AEROSOL RESPIRATORY (INHALATION) at 18:07

## 2021-06-01 RX ADMIN — PANTOPRAZOLE SODIUM 40 MG: 40 TABLET, DELAYED RELEASE ORAL at 06:20

## 2021-06-01 RX ADMIN — INSULIN LISPRO 4 UNITS: 100 INJECTION, SOLUTION INTRAVENOUS; SUBCUTANEOUS at 17:05

## 2021-06-01 RX ADMIN — NORTRIPTYLINE HYDROCHLORIDE 10 MG: 10 CAPSULE ORAL at 21:14

## 2021-06-01 RX ADMIN — SODIUM CHLORIDE, PRESERVATIVE FREE 10 ML: 5 INJECTION INTRAVENOUS at 14:30

## 2021-06-01 RX ADMIN — PROPOFOL 20 MG: 10 INJECTION, EMULSION INTRAVENOUS at 09:18

## 2021-06-01 RX ADMIN — ALBUTEROL SULFATE 2.5 MG: 2.5 SOLUTION RESPIRATORY (INHALATION) at 06:10

## 2021-06-01 RX ADMIN — MAGNESIUM GLUCONATE 500 MG ORAL TABLET 400 MG: 500 TABLET ORAL at 10:35

## 2021-06-01 RX ADMIN — FLUTICASONE PROPIONATE 1 SPRAY: 50 SPRAY, METERED NASAL at 12:19

## 2021-06-01 RX ADMIN — LIDOCAINE HYDROCHLORIDE 50 MG: 10 INJECTION, SOLUTION EPIDURAL; INFILTRATION; INTRACAUDAL; PERINEURAL at 09:10

## 2021-06-01 RX ADMIN — BUDESONIDE AND FORMOTEROL FUMARATE DIHYDRATE 1 PUFF: 160; 4.5 AEROSOL RESPIRATORY (INHALATION) at 06:15

## 2021-06-01 RX ADMIN — ALBUTEROL SULFATE 2.5 MG: 2.5 SOLUTION RESPIRATORY (INHALATION) at 14:35

## 2021-06-01 RX ADMIN — METHYLPREDNISOLONE SODIUM SUCCINATE 40 MG: 40 INJECTION, POWDER, FOR SOLUTION INTRAMUSCULAR; INTRAVENOUS at 10:36

## 2021-06-01 RX ADMIN — BUPROPION HYDROCHLORIDE 150 MG: 150 TABLET, EXTENDED RELEASE ORAL at 10:35

## 2021-06-01 RX ADMIN — Medication 1 MG: at 23:00

## 2021-06-01 RX ADMIN — PROPOFOL 20 MG: 10 INJECTION, EMULSION INTRAVENOUS at 09:12

## 2021-06-01 RX ADMIN — ALBUTEROL SULFATE 2.5 MG: 2.5 SOLUTION RESPIRATORY (INHALATION) at 18:06

## 2021-06-01 RX ADMIN — HYDROXYZINE HYDROCHLORIDE 25 MG: 25 TABLET, FILM COATED ORAL at 10:35

## 2021-06-01 RX ADMIN — Medication 1 MG: at 06:20

## 2021-06-01 RX ADMIN — LORAZEPAM 0.25 MG: 0.5 TABLET ORAL at 19:43

## 2021-06-01 RX ADMIN — Medication 1 MG: at 03:12

## 2021-06-01 RX ADMIN — SODIUM CHLORIDE: 9 INJECTION, SOLUTION INTRAVENOUS at 09:10

## 2021-06-01 RX ADMIN — TIOTROPIUM BROMIDE INHALATION SPRAY 2 PUFF: 3.12 SPRAY, METERED RESPIRATORY (INHALATION) at 06:17

## 2021-06-01 RX ADMIN — ALBUTEROL SULFATE 2.5 MG: 2.5 SOLUTION RESPIRATORY (INHALATION) at 10:13

## 2021-06-01 RX ADMIN — SODIUM CHLORIDE, PRESERVATIVE FREE 10 ML: 5 INJECTION INTRAVENOUS at 22:51

## 2021-06-01 RX ADMIN — LORAZEPAM 0.25 MG: 0.5 TABLET ORAL at 02:52

## 2021-06-01 RX ADMIN — MONTELUKAST 10 MG: 10 TABLET, FILM COATED ORAL at 19:39

## 2021-06-01 RX ADMIN — PROPOFOL 20 MG: 10 INJECTION, EMULSION INTRAVENOUS at 09:14

## 2021-06-01 RX ADMIN — Medication 2000 UNITS: at 16:18

## 2021-06-01 RX ADMIN — Medication 1 MG: at 14:30

## 2021-06-01 RX ADMIN — SODIUM CHLORIDE, PRESERVATIVE FREE 10 ML: 5 INJECTION INTRAVENOUS at 22:00

## 2021-06-01 RX ADMIN — CETIRIZINE HYDROCHLORIDE 10 MG: 10 TABLET, FILM COATED ORAL at 10:35

## 2021-06-01 RX ADMIN — TIZANIDINE 2 MG: 2 TABLET ORAL at 19:39

## 2021-06-01 RX ADMIN — ATORVASTATIN CALCIUM 40 MG: 40 TABLET, FILM COATED ORAL at 19:39

## 2021-06-01 RX ADMIN — Medication 1 MG: at 18:54

## 2021-06-01 RX ADMIN — MAGNESIUM GLUCONATE 500 MG ORAL TABLET 400 MG: 500 TABLET ORAL at 19:48

## 2021-06-01 RX ADMIN — PROPOFOL 20 MG: 10 INJECTION, EMULSION INTRAVENOUS at 09:20

## 2021-06-01 RX ADMIN — METHYLPREDNISOLONE SODIUM SUCCINATE 40 MG: 40 INJECTION, POWDER, FOR SOLUTION INTRAMUSCULAR; INTRAVENOUS at 16:32

## 2021-06-01 RX ADMIN — Medication 1 MG: at 10:35

## 2021-06-01 RX ADMIN — INSULIN LISPRO 1 UNITS: 100 INJECTION, SOLUTION INTRAVENOUS; SUBCUTANEOUS at 23:00

## 2021-06-01 RX ADMIN — ONDANSETRON 4 MG: 2 INJECTION INTRAMUSCULAR; INTRAVENOUS at 02:56

## 2021-06-01 RX ADMIN — SODIUM CHLORIDE, PRESERVATIVE FREE 20 ML: 5 INJECTION INTRAVENOUS at 19:48

## 2021-06-01 ASSESSMENT — PAIN DESCRIPTION - LOCATION
LOCATION: ABDOMEN;GENERALIZED
LOCATION: ABDOMEN
LOCATION: ABDOMEN
LOCATION: ABDOMEN;GENERALIZED

## 2021-06-01 ASSESSMENT — PULMONARY FUNCTION TESTS
PIF_VALUE: 0
PIF_VALUE: 1
PIF_VALUE: 0

## 2021-06-01 ASSESSMENT — PAIN DESCRIPTION - PAIN TYPE
TYPE: ACUTE PAIN
TYPE: CHRONIC PAIN
TYPE: CHRONIC PAIN
TYPE: ACUTE PAIN
TYPE: ACUTE PAIN

## 2021-06-01 ASSESSMENT — PAIN DESCRIPTION - FREQUENCY
FREQUENCY: CONTINUOUS

## 2021-06-01 ASSESSMENT — PAIN SCALES - GENERAL
PAINLEVEL_OUTOF10: 0
PAINLEVEL_OUTOF10: 9
PAINLEVEL_OUTOF10: 8
PAINLEVEL_OUTOF10: 0
PAINLEVEL_OUTOF10: 9
PAINLEVEL_OUTOF10: 0
PAINLEVEL_OUTOF10: 3
PAINLEVEL_OUTOF10: 10
PAINLEVEL_OUTOF10: 9
PAINLEVEL_OUTOF10: 0
PAINLEVEL_OUTOF10: 3
PAINLEVEL_OUTOF10: 0
PAINLEVEL_OUTOF10: 7
PAINLEVEL_OUTOF10: 8
PAINLEVEL_OUTOF10: 5
PAINLEVEL_OUTOF10: 0
PAINLEVEL_OUTOF10: 8

## 2021-06-01 ASSESSMENT — ENCOUNTER SYMPTOMS: SHORTNESS OF BREATH: 1

## 2021-06-01 ASSESSMENT — PAIN - FUNCTIONAL ASSESSMENT
PAIN_FUNCTIONAL_ASSESSMENT: PREVENTS OR INTERFERES SOME ACTIVE ACTIVITIES AND ADLS

## 2021-06-01 ASSESSMENT — PAIN DESCRIPTION - ORIENTATION: ORIENTATION: ANTERIOR

## 2021-06-01 ASSESSMENT — PAIN DESCRIPTION - ONSET
ONSET: ON-GOING

## 2021-06-01 ASSESSMENT — PAIN DESCRIPTION - PROGRESSION
CLINICAL_PROGRESSION: GRADUALLY WORSENING

## 2021-06-01 ASSESSMENT — PAIN DESCRIPTION - DESCRIPTORS
DESCRIPTORS: ACHING;DISCOMFORT

## 2021-06-01 NOTE — PLAN OF CARE
Problem: Falls - Risk of:  Goal: Will remain free from falls  Description: Will remain free from falls  6/1/2021 0113 by Bard Cirilo RN  Outcome: Ongoing  5/31/2021 1635 by Lindsay Hunter RN  Outcome: Ongoing     Problem: Skin Integrity:  Goal: Will show no infection signs and symptoms  Description: Will show no infection signs and symptoms  Outcome: Ongoing  Goal: Absence of new skin breakdown  Description: Absence of new skin breakdown  Outcome: Ongoing     Problem: Infection:  Goal: Will remain free from infection  Description: Will remain free from infection  Outcome: Ongoing     Problem: Daily Care:  Goal: Daily care needs are met  Description: Daily care needs are met  Outcome: Ongoing     Problem: Pain:  Goal: Patient's pain/discomfort is manageable  Description: Patient's pain/discomfort is manageable  Outcome: Ongoing  Goal: Pain level will decrease  Description: Pain level will decrease  Outcome: Ongoing     Problem: Discharge Planning:  Goal: Patients continuum of care needs are met  Description: Patients continuum of care needs are met  Outcome: Ongoing     Problem: Discharge Planning:  Goal: Discharged to appropriate level of care  Description: Discharged to appropriate level of care  Outcome: Ongoing     Problem: Sensory:  Goal: Pain level will decrease  Description: Pain level will decrease  Outcome: Ongoing

## 2021-06-01 NOTE — PROGRESS NOTES
consulted.      5/26 -patient received blood transfusions. Reportedly going for EGD and colonoscopy today. Stool culture is pending.     5/27 - Little change. Steroids started per GI following scopes. Inflammatory bowel disease with diffuse colitis noted in GI note. Still passing blood and has now required 5 units of blood.  GI completed EGD and colonoscopy yesterday and showed diffuse colitis. Patient continues to endorse abdominal discomfort. Steroids started per GI following scopes. Inflammatory bowel disease with diffuse colitis noted in GI note. Still passing blood and has now required 5 units of blood. \"     5/28-5/31- Patient has had continued bloody stools with clots and needed a total of 12 units PRBCs transfused. Review of Systems:     Constitutional:  negative for chills, fevers, sweats  Respiratory:  negative for cough, dyspnea on exertion, shortness of breath, wheezing  Cardiovascular:  negative for chest pain, chest pressure/discomfort, lower extremity edema, palpitations  Gastrointestinal:  negative for abdominal pain, constipation, diarrhea, nausea, vomiting  Neurological:  negative for dizziness, headache    Medications: Allergies:     Allergies   Allergen Reactions    Aspirin     Ibuprofen     Sulfa Antibiotics     Tape Caesar Ramon Tape]     Lyrica [Pregabalin] Other (See Comments)     \"made her goofy\"       Current Meds:   Scheduled Meds:    [Held by provider] insulin detemir  10 Units Subcutaneous BID    methylPREDNISolone  40 mg Intravenous Q8H    albuterol  2.5 mg Nebulization 4x daily    sodium chloride flush  10 mL Intravenous 2 times per day    [Held by provider] furosemide  40 mg Oral BID    montelukast  10 mg Oral Nightly    insulin lispro  0-12 Units Subcutaneous TID WC    insulin lispro  0-6 Units Subcutaneous Nightly    sodium chloride flush  5-40 mL Intravenous Q12H    atorvastatin  40 mg Oral Nightly    budesonide-formoterol  1 puff Inhalation BID    Vitamin D 2,000 Units Oral Dinner    ferrous sulfate  325 mg Oral Dinner    fluticasone  1 spray Nasal Daily    hydrOXYzine  25 mg Oral BID    magnesium oxide  400 mg Oral BID    mirtazapine  15 mg Oral Nightly    pantoprazole  40 mg Oral QAM AC    tiotropium  2 puff Inhalation Daily    tiZANidine  2 mg Oral BID    buPROPion  150 mg Oral QAM    DULoxetine  90 mg Oral Daily    nortriptyline  10 mg Oral Nightly    cetirizine  10 mg Oral Daily    [Held by provider] alogliptin  12.5 mg Oral Daily     Continuous Infusions:    sodium chloride      sodium chloride 125 mL/hr at 21 3391    dextrose      sodium chloride       PRN Meds: sodium chloride, sodium chloride, sodium chloride flush, glucose, dextrose, glucagon (rDNA), dextrose, HYDROmorphone, loperamide, sodium chloride flush, sodium chloride, promethazine **OR** ondansetron, acetaminophen **OR** acetaminophen, albuterol sulfate HFA, LORazepam    Data:     Past Medical History:   has a past medical history of ASHLEIGH (acute kidney injury) ([facility] Arthritis, Asthma, Bipolar 1 disorder ([facility] CHF (congestive heart failure) ([facility] COPD [facility]chronic obstructive [facility] disease) ([facility] Depression, Diabetes mellitus ([facility] Fibromyalgia, GERD (gastroesophageal reflux disease), Hyperlipidemia, Hypertension, and Pneumonia. Social History:   reports that she has been smoking. She has a 40.00 pack-year smoking history. She has never used smokeless tobacco. She reports that she does not drink alcohol and does not use drugs.      Family History:   Family History   Problem Relation Age of Onset    Heart Failure Mother     Cancer Sister     Cancer Maternal Grandmother     Heart Failure Maternal Grandmother        Vitals:  BP (!) 103/92   Pulse 82   Temp 97.5 °F (36.4 °C) (Temporal)   Resp 19   Ht 4' 7\" (1.397 m)   Wt 185 lb 3 oz (84 kg)   SpO2 93%   BMI 43.04 kg/m²   Temp (24hrs), Av.7 °F (36.5 °C), Min:97.3 °F (36.3 °C), Max:98.1 °F (36.7 °C)    Recent Labs 05/31/21 2005 06/01/21 0300 06/01/21  0610 06/01/21  0936   POCGLU 173* 238* 209* 190*       I/O (24Hr): Intake/Output Summary (Last 24 hours) at 6/1/2021 1149  Last data filed at 6/1/2021 1000  Gross per 24 hour   Intake 1102 ml   Output 600 ml   Net 502 ml       Labs:  Hematology:  Recent Labs     05/30/21 0417 05/31/21  1930 06/01/21  0205 06/01/21  0902   WBC 14.4*  --   --  20.9*   RBC 2.61*  --   --  2.45*   HGB 7.5* 7.8* 7.0* 7.1*   HCT 23.1* 23.9* 21.9* 22.3*   MCV 88.5  --   --  91.0   MCH 28.7  --   --  29.0   MCHC 32.5  --   --  31.8   RDW 15.9*  --   --  16.8*     --   --  244   MPV 10.2  --   --  10.4   INR  --  1.3  --   --      Chemistry:  Recent Labs     05/30/21 0417 05/31/21  0557 06/01/21  0902    136 136   K 3.8 3.8 5.4*    102 103   CO2 24 25 22   GLUCOSE 207* 143* 206*   BUN 18 26* 37*   CREATININE 1.07* 1.98* 1.98*   ANIONGAP 9 9 11   LABGLOM 50* 25* 25*   GFRAA >60 30* 30*   CALCIUM 6.7* 6.7* 6.7*     Recent Labs     05/30/21 0417 05/31/21  1058 05/31/21  1641 05/31/21 2005 06/01/21  0300 06/01/21  0610 06/01/21  0902 06/01/21  0936   PROT 4.3*  --   --   --   --   --  4.2*  --    LABALBU 2.5*  --   --   --   --   --  2.6*  --    AST 9  --   --   --   --   --  15  --    ALT 8  --   --   --   --   --  12  --    ALKPHOS 75  --   --   --   --   --  72  --    BILITOT 0.25*  --   --   --   --   --  0.34  --    BILIDIR 0.09  --   --   --   --   --   --   --    LIPASE 16  --   --   --   --   --   --   --    POCGLU  --  179* 143* 173* 238* 209*  --  190*     ABG:  Lab Results   Component Value Date    POCPH 7.26 12/31/2016    POCPCO2 56 12/31/2016    POCPO2 80 12/31/2016    POCHCO3 25.3 12/31/2016    NBEA 2 12/31/2016    PBEA NOT REPORTED 12/31/2016    VOY7SWD 27 12/31/2016    OXPH4VZQ 93 12/31/2016    FIO2 NOT REPORTED 12/31/2016         Radiology:  CT ABDOMEN PELVIS W IV CONTRAST Additional Contrast? None    Result Date: 5/25/2021  Acute nonspecific pancolitis. Infectious or inflammatory colitis can be considered, less likely ischemic since the major visceral arteries appear patent. XR CHEST PORTABLE    Result Date: 5/26/2021  Subtle bibasilar infiltrates representing atelectasis versus pneumonia. XR CHEST 1 VIEW    Result Date: 5/29/2021  Minimal left basilar atelectasis. Otherwise, clear lungs. Cardiomegaly. IR INSERT PICC VAD W SQ PORT >5 YEARS    Result Date: 5/28/2021  Successful ultrasound and fluoroscopy guided right basilic vein 5 [place]ian power injectable dual-lumen PICC placement. Ready for use.        Physical Examination:        General appearance:  alert, cooperative and no distress  Mental Status:  oriented to person, place and time and normal affect  Lungs:  clear to auscultation bilaterally, normal effort  Heart:  regular rate and rhythm, no murmur  Abdomen:  soft, nontender, nondistended, normal bowel sounds, no masses, hepatomegaly, splenomegaly; obese  Extremities:  no edema, redness, tenderness in the calves  Skin:  no gross lesions, rashes, induration    Assessment:        Hospital Problems         Last Modified POA    * (Principal) GI (gastrointestinal bleed) 5/25/2021 Yes    Chronic obstructive pulmonary disease with acute exacerbation ([address] 5/25/2021 Yes    ASHLEIGH (acute kidney injury) ([address] 5/25/2021 Yes    Type 2 diabetes mellitus without complication, without long-term current use of insulin ([address] 5/25/2021 Yes    Bipolar 1 disorder ([address] 5/25/2021 Yes    BRBPR (bright red blood per rectum) 5/25/2021 Yes    Acute colitis 5/25/2021 Yes    Anemia due to blood loss, acute 5/25/2021 Yes    Chronic diastolic congestive heart failure ([address] 6/1/2021 Yes    Pancolitis ([address] 5/26/2021 Yes          Plan:        Start clears  Monitor h/h closely; transfuse prn, has had 12u prbc thus far and 2u ffp  D/w daughter    Salo Li, DO  6/1/2021  11:49 AM

## 2021-06-01 NOTE — PROGRESS NOTES
Physical Therapy  DATE: 2021    NAME: Minerva Fitzgerald  MRN: [id number]   : 1945    Patient not seen this date for Physical Therapy due to:  [] Blood transfusion in progress  [] Cancel by RN  [] Hemodialysis  []  Refusal by Patient   [] Spine Precautions   [] Strict Bedrest  [] Surgery  [] Testing      [x] Other ([facility] 7.0, having EGD this A.M. Will cont to follow.)        [] PT being discontinued at this time. Patient independent. No further needs. [] PT being discontinued at this time as the patient has been transferred to hospice care. No further needs.     Kim Wahl, PTA

## 2021-06-01 NOTE — PROGRESS NOTES
Occupational Therapy    DATE: 2021    NAME: Patricia Chávez  MRN: [id number]   : 1945      [facility]  Occupational Therapy Not Seen Note    Patient not available for Occupational Therapy due to:    [] Testing:    [] Hemodialysis    [] Cancelled by RN:    []Refusal by Patient:    [] Surgery:     [] Intubation:     [] Pain Medication:    [] Sedation:     [] Spine Precautions :    [] Medical Instability:    [x] Other:  [facility] 7.0, having EGD this A.M. Will cont to follow.     NEIL Angeles/INDIRA

## 2021-06-01 NOTE — OP NOTE
PROCEDURE NOTE    DATE OF PROCEDURE: 6/1/2021    SURGEON: Eduardo Freeman MD    ASSISTANT: None    PREOPERATIVE DIAGNOSIS: RECTAL BLEEDING  SEVERE ANEMIA    POSTOPERATIVE DIAGNOSIS: as described below    OPERATION: Total colonoscopy     ANESTHESIA: MAC PER ANESTHESIA     ESTIMATED BLOOD LOSS: less than 50     COMPLICATIONS: None. SPECIMENS:  Was Not Obtained    HISTORY: The patient is a 77y.o. year old female with history of above preop diagnosis. I recommended colonoscopy with possible biopsy or polypectomy and I explained the risk, benefits, expected outcome, and alternatives to the procedure. Risks included but are not limited to bleeding, infection, respiratory distress, hypotension, and perforation of the colon and possibility of missing a lesion. The patient understands and is in agreement. PROCEDURE: The patient was given IV conscious sedation. The patient's SPO2 remained above 90% throughout the procedure. The colonoscope was inserted per rectum and advanced under direct vision to the 45 CM. The prep was poor. Findings:  DARK BLACK VERY STICKY PASTY STOOLS   VISUALIZATION WAS NOT VERY GOOD   NO ACTIVE STIGMATA WAS NOTED        The colon was decompressed and the scope was removed. The patient tolerated the procedure well. Recommendations/Plan:   1. F/U HGB  2. CONT CURRENT RX PLANS  3. POST SEDATION PATIENT WAS STABLE WITH STABLE VITAL SIGNS AND OXYGEN SATURATIONS AND WAS DISCHARGED HOME WITH RIDE IN A STABLE CONDITION.     Electronically signed by Eduardo Freeman MD  on 6/1/2021 at 9:23 AM

## 2021-06-01 NOTE — PROGRESS NOTES
Patient transferred to room 2006 from [facility] via bed. Vitals stable. Patient denies any pain at this time. 3/4 bed rails up. Alert and oriented. Oriented to room and call remote. Ice chips provided. Will continue to monitor patient.

## 2021-06-01 NOTE — ANESTHESIA PRE PROCEDURE
Department of Anesthesiology  Preprocedure Note       Name:  Helen Parrish   Age:  77 y.o.  :  1945                                          MRN:  [id number]         Date:  2021      Surgeon: Oscar Ron):  Umu Chadwick MD    Procedure: Procedure(s):  EGD ESOPHAGOGASTRODUODENOSCOPY  SIGMOIDOSCOPY DIAGNOSTIC FLEXIBLE    Medications prior to admission:   Prior to Admission medications    Medication Sig Start Date End Date Taking? Authorizing Provider   DULoxetine (CYMBALTA) 30 MG extended release capsule Take 90 mg by mouth daily   Yes Historical Provider, MD   furosemide (LASIX) 40 MG tablet Take 40 mg by mouth daily   Yes Historical Provider, MD   pantoprazole (PROTONIX) 40 MG tablet Take 40 mg by mouth 2 times daily   Yes Historical Provider, MD   levocetirizine (XYZAL) 5 MG tablet Take 2.5 mg by mouth nightly   Yes Historical Provider, MD   albuterol sulfate HFA (VENTOLIN HFA) 108 (90 Base) MCG/ACT inhaler Inhale 2 puffs into the lungs every 6 hours as needed for Wheezing   Yes Historical Provider, MD   apixaban (ELIQUIS) 5 MG TABS tablet Take 5 mg by mouth 2 times daily   Yes Historical Provider, MD   Insulin Detemir (LEVEMIR FLEXPEN SC) Inject 10 Units into the skin 2 times daily   Yes Historical Provider, MD   Umeclidinium Bromide (INCRUSE ELLIPTA) 62.5 MCG/INH AEPB Inhale 1 puff into the lungs daily   Yes Historical Provider, MD   diclofenac sodium (VOLTAREN) 1 % GEL Apply 2 g topically 2 times daily Indications: TO BOTH SHOULDERS. Yes Historical Provider, MD   insulin aspart (NOVOLOG FLEXPEN) 100 UNIT/ML injection pen Inject 0-8 Units into the skin 3 times daily (before meals) Indications: sliding scale   Yes Historical Provider, MD   LORazepam (ATIVAN) 0.5 MG tablet Take 0.25 mg by mouth 2 times daily as needed for Anxiety.    Yes Historical Provider, MD   Dulaglutide (TRULICITY) 1.5 RF/9.5ZS SOPN Inject 1.5 mg into the skin once a week   Yes Historical Provider, MD   cyanocobalamin 1000 MCG tablet Take 1,000 mcg by mouth daily   Yes Historical Provider, MD   Melatonin 10 MG TABS Take 10 mg by mouth nightly   Yes Historical Provider, MD   nortriptyline (PAMELOR) 10 MG capsule Take 10 mg by mouth nightly   Yes Historical Provider, MD   zonisamide (ZONEGRAN) 25 MG capsule Take 25 mg by mouth daily   Yes Historical Provider, MD   polyethylene glycol (GLYCOLAX) 17 g packet Take 17 g by mouth daily   Yes Historical Provider, MD   buPROPion (WELLBUTRIN XL) 150 MG extended release tablet Take 150 mg by mouth every morning   Yes Historical Provider, MD   fluticasone (FLONASE) 50 MCG/ACT nasal spray 1 spray by Nasal route daily 6/27/17  Yes Mulugeta Shipman MD   docusate (COLACE, DULCOLAX) 100 MG CAPS Take 100 mg by mouth nightly  Patient taking differently: Take 100 mg by mouth daily as needed  6/27/17  Yes Mulugeta Shipman MD   ferrous sulfate 325 (65 FE) MG tablet Take 325 mg by mouth 2 times daily (with meals)    Yes Historical Provider, MD   Cholecalciferol (VITAMIN D) 2000 UNITS CAPS capsule Take 2,000 Units by mouth Daily with supper   Yes Historical Provider, MD   magnesium oxide (MAG-OX) 400 MG tablet Take 400 mg by mouth 2 times daily Indications: do not take at same time as ferrous sulfate   Yes Historical Provider, MD   SitaGLIPtin Phosphate (JANUVIA PO) Take 100 mg by mouth daily    Yes Historical Provider, MD   mirtazapine (REMERON) 15 MG tablet Take 15 mg by mouth nightly. Yes Historical Provider, MD   albuterol (PROVENTIL) (2.5 MG/3ML) 0.083% nebulizer solution Take 2.5 mg by nebulization every 4 hours as needed for Wheezing    Yes Historical Provider, MD   hydrOXYzine (ATARAX) 25 MG tablet Take 25 mg by mouth 2 times daily. Yes Historical Provider, MD   atorvastatin (LIPITOR) 40 MG tablet Take 40 mg by mouth daily.    Yes Historical Provider, MD   metoprolol (TOPROL-XL) 25 MG XL tablet Take 12.5 mg by mouth daily Indications: 1/2 tab  12.5 mg    Yes Historical Provider, MD   loperamide (LOPERAMIDE A-D) 2 MG tablet Take 2 mg by mouth as needed for Diarrhea (takes in the evening)    Historical Provider, MD   tiZANidine (ZANAFLEX) 2 MG tablet Take 2 mg by mouth 2 times daily     Historical Provider, MD   budesonide-formoterol (SYMBICORT) 160-4.5 MCG/ACT AERO Inhale 1 puff into the lungs 2 times daily.     Historical Provider, MD       Current medications:    Current Facility-Administered Medications   Medication Dose Route Frequency Provider Last Rate Last Admin    0.9 % sodium chloride infusion   Intravenous PRN LONDON Yarbrough NP        0.9 % sodium chloride infusion   Intravenous PRN Malka Montoya MD        [Held by provider] insulin detemir (LEVEMIR) injection pen 10 Units  10 Units Subcutaneous BID Javan Guillen MD   10 Units at 05/30/21 2232    methylPREDNISolone sodium (SOLU-MEDROL) injection 40 mg  40 mg Intravenous Q8H Cyndi Franco MD   40 mg at 05/31/21 2354    albuterol (PROVENTIL) nebulizer solution 2.5 mg  2.5 mg Nebulization 4x daily Javan Guillen MD   2.5 mg at 06/01/21 0610    sodium chloride flush 0.9 % injection 10 mL  10 mL Intravenous 2 times per day Tim Chiang MD   10 mL at 05/31/21 1942    sodium chloride flush 0.9 % injection 10 mL  10 mL Intravenous PRN Tim Chiang MD   10 mL at 05/28/21 1732    [Held by provider] furosemide (LASIX) tablet 40 mg  40 mg Oral BID Javan Guillen MD   40 mg at 05/30/21 1758    montelukast (SINGULAIR) tablet 10 mg  10 mg Oral Nightly Javan Guillen MD   10 mg at 05/31/21 1943    insulin lispro (HUMALOG) injection vial 0-12 Units  0-12 Units Subcutaneous TID WC LONDON Yarbrough NP   2 Units at 05/31/21 1248    insulin lispro (HUMALOG) injection vial 0-6 Units  0-6 Units Subcutaneous Nightly LONDON Yarbrough NP   2 Units at 05/29/21 2222    glucose (GLUTOSE) 40 % oral gel 15 g  15 g Oral PRN LONDON Yarbrough NP        dextrose 50 % IV solution  12.5 g Intravenous PRN Kelsey Bermudez, APRN - NP  glucagon (rDNA) injection 1 mg  1 mg Intramuscular PRN Ane Flirt, APRN - NP        dextrose 5 % solution  100 mL/hr Intravenous PRN Ane Flirt, APRN - NP        sodium chloride flush 0.9 % injection 5-40 mL  5-40 mL Intravenous Q12H Ana Luisa Cleaves, APRN - CNP   10 mL at 05/31/21 2154    HYDROmorphone HCl PF (DILAUDID) injection 1 mg  1 mg Intravenous Q3H PRN Tabby Strickland MD   1 mg at 06/01/21 0620    atorvastatin (LIPITOR) tablet 40 mg  40 mg Oral Nightly Tabby Strickland MD   40 mg at 05/31/21 1943    budesonide-formoterol (SYMBICORT) 160-4.5 MCG/ACT inhaler 1 puff  1 puff Inhalation BID Tabby Strickland MD   1 puff at 06/01/21 0615    Vitamin D (CHOLECALCIFEROL) tablet 2,000 Units  2,000 Units Oral Dinner Tabby Strickland MD   2,000 Units at 05/31/21 1738    ferrous sulfate (FE TABS 325) EC tablet 325 mg  325 mg Oral Dinner Tabby Strickland MD   325 mg at 05/31/21 1737    fluticasone (FLONASE) 50 MCG/ACT nasal spray 1 spray  1 spray Nasal Daily Tabby Strickland MD   1 spray at 05/31/21 0927    hydrOXYzine (ATARAX) tablet 25 mg  25 mg Oral BID Tabby Strickland MD   25 mg at 05/31/21 1943    loperamide (IMODIUM) capsule 2 mg  2 mg Oral PRN Tabby Strickland MD        magnesium oxide (MAG-OX) tablet 400 mg  400 mg Oral BID Tabby Strickland MD   400 mg at 05/31/21 1943    mirtazapine (REMERON) tablet 15 mg  15 mg Oral Nightly Tabby Strickland MD   15 mg at 05/31/21 1943    pantoprazole (PROTONIX) tablet 40 mg  40 mg Oral QAM AC Fabienne Coronado MD   40 mg at 06/01/21 0620    tiotropium (SPIRIVA RESPIMAT) 2.5 MCG/ACT inhaler 2 puff  2 puff Inhalation Daily Tabby Strickland MD   2 puff at 06/01/21 0617    tiZANidine (ZANAFLEX) tablet 2 mg  2 mg Oral BID Tabby Strickland MD   2 mg at 05/31/21 1943    sodium chloride flush 0.9 % injection 5-40 mL  5-40 mL Intravenous PRN Tabby Strickland MD        0.9 % sodium chloride infusion  25 mL Intravenous PRN Tabby Strickland MD        promethazine (PHENERGAN) tablet 12.5 mg  12.5 mg Oral Q6H PRN Fabienne Coronado MD        Or    ondansetron (ZOFRAN) injection 4 mg  4 mg Intravenous Q6H PRN Fabienne Coronaod MD   4 mg at 06/01/21 0256    acetaminophen (TYLENOL) tablet 650 mg  650 mg Oral Q6H PRN Fabienne Coronado MD   650 mg at 05/29/21 0809    Or    acetaminophen (TYLENOL) suppository 650 mg  650 mg Rectal Q6H PRN Fabienne Coronado MD        albuterol sulfate  (90 Base) MCG/ACT inhaler 2 puff  2 puff Inhalation Q6H PRN Fabienne Coronado MD        buPROPion (WELLBUTRIN XL) extended release tablet 150 mg  150 mg Oral QAM Fabienne Coronado MD   150 mg at 05/31/21 0927    DULoxetine (CYMBALTA) extended release capsule 90 mg  90 mg Oral Daily Fabienne Coronado MD   90 mg at 05/31/21 0927    LORazepam (ATIVAN) tablet 0.25 mg  0.25 mg Oral BID PRN Fabienne Coronado MD   0.25 mg at 06/01/21 0252    nortriptyline (PAMELOR) capsule 10 mg  10 mg Oral Nightly Fabienne Coronado MD   10 mg at 05/31/21 1943    cetirizine (ZYRTEC) tablet 10 mg  10 mg Oral Daily Fabienne Coronado MD   10 mg at 05/31/21 0927    [Held by provider] alogliptin (NESINA) tablet 12.5 mg  12.5 mg Oral Daily Hari Quiros MD   12.5 mg at 05/27/21 5841       Allergies:     Allergies   Allergen Reactions    Aspirin     Ibuprofen     Sulfa Antibiotics     Tape Alfriedluz Hood Tape]     Lyrica [Pregabalin] Other (See Comments)     \"made her goofy\"       Problem List:    Patient Active Problem List   Diagnosis Code    COPD exacerbation ([facility] [address] J44.1    Chronic obstructive pulmonary disease with acute exacerbation ([facility] [address] J44.1    Septic shock ([address] A41.9, R65.21    ARDS (adult respiratory distress syndrome) ([facility] [address] J80    Aspiration pneumonia ([facility] [address] J69.0    ASHLEIGH (acute kidney injury) ([facility] [address] N17.9    Disorientation R41.0    Encephalopathy acute G93.40    Smoker F17.200    Type 2 diabetes mellitus without complication, without long-term current use of insulin (HCC) E11.9    Acute recurrent sinusitis J01.91    Acute respiratory failure with hypoxia and hypercapnia (HCC) J96.01, J96.02    Diarrhea R19.7    SOB (shortness of breath) R06.02    Chest pain on breathing R07.1    Acute bronchitis due to infection J20.8    Chronic respiratory failure with hypoxia (HCC) J96.11    Chronic obstructive pulmonary disease (HCC) J44.9    Bipolar 1 disorder (HCC) F31.9    Depression F32.9    Dizziness R42    Polypharmacy Z79.899    Pre-syncope R55    Fall from other slipping, tripping, or stumbling W01. 0XXA    Lumbar spine tumor D49.2    BRBPR (bright red blood per rectum) K62.5    Acute colitis K52.9    Atrial fibrillation with rapid ventricular response ([facility]) I48.91    Chronic back pain M54.9, G89.29    QT prolongation R94.31    Anemia due to blood loss, acute D62    GI (gastrointestinal bleed) K92.2    Hypertension I10    Hyperlipidemia E78.5    CHF (congestive heart failure) (HCC) I50.9    GERD (gastroesophageal reflux disease) K21.9    Asthma J45.909    Pancolitis ([facility]) K51.00       Past Medical History:        Diagnosis Date    [facility]SHLEIGH ([facility] kidney [facility]) ([facility] [address] 4/16/2016    Arthritis     Asthma     Bipolar 1 disorder (HCC)     CHF (congestive heart failure) (HCC)     COPD (chronic obstructive pulmonary disease) (HCC)     3 L home O2    Depression     Diabetes mellitus (HCC)     Fibromyalgia     GERD (gastroesophageal reflux disease)     Hyperlipidemia     Hypertension     Pneumonia        Past Surgical History:        Procedure Laterality Date    CHOLECYSTECTOMY      COLONOSCOPY  6/26/2017    COLONOSCOPY POLYPECTOMY REMOVAL HOT BIOPSY/STOMA performed by Mark Sandoval DO at [facility]    COLONOSCOPY N/A 5/26/2021    COLONOSCOPY WITH BIOPSY performed by Sandra Moreno MD at [place] [address] Left     HYSTERECTOMY      IR INS PICC VAD W SQ PORT GREATER THAN 5  5/28/2021    IR INS PICC VAD W SQ PORT GREATER THAN 5 5/28/2021 [facility]    KNEE ARTHROPLASTY Left     IL COLSC FLX W/RMVL OF TUMOR POLYP LESION SNARE TQ  6/26/2017    COLONOSCOPY POLYPECTOMY SNARE/COLD BIOPSY performed by Annmarie Ray DO at [facility]    [place] EGD TRANSORAL BIOPSY SINGLE/MULTIPLE N/A 2/14/2017    EGD BIOPSY performed by Gayatri العلي MD at [facility] 2 ENDOSCOPY N/A 5/26/2021    EGD DIAGNOSTIC ONLY performed by Franck Alvarez MD at [place] [id number] History:    Social History     Tobacco Use    Smoking status: Heavy Tobacco Smoker     Packs/day: 1.00     Years: 40.00     Pack years: 40.00    Smokeless tobacco: Never Used   Substance Use Topics    Alcohol use: No                                Ready to quit: Not Answered  Counseling given: Not Answered      Vital Signs (Current):   Vitals:    06/01/21 0610 06/01/21 0620 06/01/21 0630 06/01/21 0738   BP:  (!) 120/55  (!) 91/48   Pulse:  97  97   Resp: 16  18 18   Temp:    97.6 °F (36.4 °C)   TempSrc:    Oral   SpO2: 95%   99%   Weight:       Height:                                                  BP Readings from Last 3 Encounters:   06/01/21 (!) 91/48   05/26/21 (!) 112/55   06/27/17 (!) 135/59       NPO Status:                                                                                 BMI:   Wt Readings from Last 3 Encounters:   06/01/21 185 lb 3 oz (84 kg)   06/22/17 170 lb 3 oz (77.2 kg)   02/12/17 174 lb 13.2 oz (79.3 kg)     Body mass index is 43.04 kg/m².     CBC:   Lab Results   Component Value Date    WBC 14.4 05/30/2021    RBC 2.61 05/30/2021    HGB 7.0 06/01/2021    HCT 21.9 06/01/2021    MCV 88.5 05/30/2021    RDW 15.9 05/30/2021     05/30/2021       CMP:   Lab Results   Component Value Date     05/31/2021    K 3.8 05/31/2021     05/31/2021    CO2 25 05/31/2021    BUN 26 05/31/2021    CREATININE 1.98 05/31/2021    GFRAA 30 05/31/2021    LABGLOM 25 05/31/2021    GLUCOSE 143 05/31/2021    PROT 4.3 05/30/2021    CALCIUM 6.7 05/31/2021    BILITOT 0.25 05/30/2021    ALKPHOS 75 05/30/2021    AST 9 05/30/2021    ALT 8 05/30/2021       POC Tests:   Recent Labs     06/01/21  0610   POCGLU 209*       Coags:   Lab Results   Component Value Date    PROTIME 15.8 05/31/2021    INR 1.3 05/31/2021    APTT 22.3 05/31/2021       HCG (If Applicable): No results found for: PREGTESTUR, PREGSERUM, HCG, HCGQUANT     ABGs: No results found for: PHART, PO2ART, SGC3WXI, HAV2TUD, BEART, [id number]     Type & Screen (If Applicable):  No results found for: LABABO, LABRH    Drug/Infectious Status (If Applicable):  Lab Results   Component Value Date    HEPCAB NONREACTIVE 05/30/2021       COVID-19 Screening (If Applicable):   Lab Results   Component Value Date    COVID19 Not Detected 05/25/2021           Anesthesia Evaluation    Airway: Mallampati: I  TM distance: >3 FB   Neck ROM: full  Mouth opening: > = 3 FB Dental:          Pulmonary:   (+) COPD:  shortness of breath: no interval change,                             Cardiovascular:        (-)  angina                Neuro/Psych:               GI/Hepatic/Renal:             Endo/Other:    (+) Diabetes, . Abdominal:           Vascular:                                        Anesthesia Plan      general     ASA 4             Anesthetic plan and risks discussed with patient.                       Carlyn Macias MD   6/1/2021

## 2021-06-01 NOTE — CARE COORDINATION
Social Work-Received phone call from daughter, Willam([phone]). She is patient's medical POA. She states that patient woul dlike rehab at [place]. She has been at [facility] in the past and would like to go there.  Sent referral. Jhony Cade

## 2021-06-01 NOTE — OP NOTE
PROCEDURE NOTE    DATE OF PROCEDURE: 6/1/2021     SURGEON: Tesha Arredondo MD    ASSISTANT: None    PREOPERATIVE DIAGNOSIS: GI BLEEDING    POSTOPERATIVE DIAGNOSIS: As described below    OPERATION: Upper GI endoscopy with Biopsy    ANESTHESIA: MAC PER ANESTHESIA     ESTIMATED BLOOD LOSS: Less than 50 ml    COMPLICATIONS: None. SPECIMENS:  Was Not Obtained    HISTORY: The patient is a 77y.o. year old female with history of above preop diagnosis. I recommended esophagogastroduodenoscopy with possible biopsy and I explained the risk, benefits, expected outcome, and alternatives to the procedure. Risks included but are not limited to bleeding, infection, respiratory distress, hypotension, and perforation of the esophagus, stomach, or duodenum. Patient understands and is in agreement. PROCEDURE: The patient was given IV conscious sedation. The patient's SPO2 remained above 90% throughout the procedure. The gastroscope was inserted orally and advanced under direct vision through the esophagus, through the stomach, through the pylorus, and into the descending duodenum. Findings:    Retropharyngeal area was grossly normal appearing    Esophagus: abnormal: EVIDENCE OF LINEAR MILD EROSIVE GASTRITIS BIOPSIES WERE NOT TAKEN AT THIS TIME  NO STIGMATA OF BLEEDING    Stomach:    Fundus: normal    Body: abnormal: MILD EROSIVE GASTRITIS    Antrum: abnormal: AS ABOVE    Duodenum:     Descending: normal    Bulb: normal    The scope was removed and the patient tolerated the procedure well. Recommendations/Plan:   1. F/U HGB  2. PPI  3. Discussed with the family  4.  Post sedation patient was stable with stable vital signs and stable O2 saturations    Electronically signed by Tesha Arredondo MD  on 6/1/2021 at 9:14 AM

## 2021-06-02 ENCOUNTER — APPOINTMENT (OUTPATIENT)
Dept: GENERAL RADIOLOGY | Age: 76
DRG: 385 | End: 2021-06-02
Payer: COMMERCIAL

## 2021-06-02 LAB
GLUCOSE BLD-MCNC: 172 MG/DL (ref 65–105)
GLUCOSE BLD-MCNC: 186 MG/DL (ref 65–105)
GLUCOSE BLD-MCNC: 223 MG/DL (ref 65–105)
GLUCOSE BLD-MCNC: 277 MG/DL (ref 65–105)
HCT VFR BLD CALC: 21.2 % (ref 36.3–47.1)
HCT VFR BLD CALC: 24.1 % (ref 36.3–47.1)
HCT VFR BLD CALC: 25.2 % (ref 36.3–47.1)
HCT VFR BLD CALC: 25.6 % (ref 36.3–47.1)
HEMOGLOBIN: 6.8 G/DL (ref 11.9–15.1)
HEMOGLOBIN: 7.9 G/DL (ref 11.9–15.1)
HEMOGLOBIN: 8.1 G/DL (ref 11.9–15.1)
HEMOGLOBIN: 8.2 G/DL (ref 11.9–15.1)
MITOCHONDRIAL ANTIBODY: <0.5 U/ML (ref 0–4)

## 2021-06-02 PROCEDURE — 6370000000 HC RX 637 (ALT 250 FOR IP): Performed by: INTERNAL MEDICINE

## 2021-06-02 PROCEDURE — 6360000002 HC RX W HCPCS: Performed by: INTERNAL MEDICINE

## 2021-06-02 PROCEDURE — 2700000000 HC OXYGEN THERAPY PER DAY

## 2021-06-02 PROCEDURE — 2580000003 HC RX 258: Performed by: INTERNAL MEDICINE

## 2021-06-02 PROCEDURE — 97535 SELF CARE MNGMENT TRAINING: CPT

## 2021-06-02 PROCEDURE — 71045 X-RAY EXAM CHEST 1 VIEW: CPT

## 2021-06-02 PROCEDURE — 82947 ASSAY GLUCOSE BLOOD QUANT: CPT

## 2021-06-02 PROCEDURE — 97530 THERAPEUTIC ACTIVITIES: CPT

## 2021-06-02 PROCEDURE — 99232 SBSQ HOSP IP/OBS MODERATE 35: CPT | Performed by: INTERNAL MEDICINE

## 2021-06-02 PROCEDURE — 86900 BLOOD TYPING SEROLOGIC ABO: CPT

## 2021-06-02 PROCEDURE — 94761 N-INVAS EAR/PLS OXIMETRY MLT: CPT

## 2021-06-02 PROCEDURE — 94640 AIRWAY INHALATION TREATMENT: CPT

## 2021-06-02 PROCEDURE — 6370000000 HC RX 637 (ALT 250 FOR IP): Performed by: NURSE PRACTITIONER

## 2021-06-02 PROCEDURE — 85018 HEMOGLOBIN: CPT

## 2021-06-02 PROCEDURE — 85014 HEMATOCRIT: CPT

## 2021-06-02 PROCEDURE — 99233 SBSQ HOSP IP/OBS HIGH 50: CPT | Performed by: INTERNAL MEDICINE

## 2021-06-02 PROCEDURE — 36430 TRANSFUSION BLD/BLD COMPNT: CPT

## 2021-06-02 PROCEDURE — 1200000000 HC SEMI PRIVATE

## 2021-06-02 PROCEDURE — P9016 RBC LEUKOCYTES REDUCED: HCPCS

## 2021-06-02 RX ORDER — GUAIFENESIN/DEXTROMETHORPHAN 100-10MG/5
10 SYRUP ORAL EVERY 4 HOURS PRN
Status: DISCONTINUED | OUTPATIENT
Start: 2021-06-02 | End: 2021-06-05 | Stop reason: HOSPADM

## 2021-06-02 RX ORDER — FUROSEMIDE 10 MG/ML
40 INJECTION INTRAMUSCULAR; INTRAVENOUS ONCE
Status: COMPLETED | OUTPATIENT
Start: 2021-06-02 | End: 2021-06-02

## 2021-06-02 RX ORDER — SODIUM CHLORIDE 9 MG/ML
INJECTION, SOLUTION INTRAVENOUS PRN
Status: DISCONTINUED | OUTPATIENT
Start: 2021-06-02 | End: 2021-06-03 | Stop reason: SDUPTHER

## 2021-06-02 RX ADMIN — MAGNESIUM GLUCONATE 500 MG ORAL TABLET 400 MG: 500 TABLET ORAL at 09:26

## 2021-06-02 RX ADMIN — DULOXETINE HYDROCHLORIDE 90 MG: 60 CAPSULE, DELAYED RELEASE ORAL at 09:26

## 2021-06-02 RX ADMIN — ALBUTEROL SULFATE 2.5 MG: 2.5 SOLUTION RESPIRATORY (INHALATION) at 17:58

## 2021-06-02 RX ADMIN — INSULIN LISPRO 6 UNITS: 100 INJECTION, SOLUTION INTRAVENOUS; SUBCUTANEOUS at 12:01

## 2021-06-02 RX ADMIN — ALBUTEROL SULFATE 2.5 MG: 2.5 SOLUTION RESPIRATORY (INHALATION) at 10:02

## 2021-06-02 RX ADMIN — MAGNESIUM GLUCONATE 500 MG ORAL TABLET 400 MG: 500 TABLET ORAL at 20:40

## 2021-06-02 RX ADMIN — METHYLPREDNISOLONE SODIUM SUCCINATE 40 MG: 40 INJECTION, POWDER, FOR SOLUTION INTRAMUSCULAR; INTRAVENOUS at 17:04

## 2021-06-02 RX ADMIN — Medication 1 MG: at 03:23

## 2021-06-02 RX ADMIN — INSULIN LISPRO 2 UNITS: 100 INJECTION, SOLUTION INTRAVENOUS; SUBCUTANEOUS at 07:57

## 2021-06-02 RX ADMIN — TIOTROPIUM BROMIDE INHALATION SPRAY 2 PUFF: 3.12 SPRAY, METERED RESPIRATORY (INHALATION) at 10:04

## 2021-06-02 RX ADMIN — SODIUM CHLORIDE, PRESERVATIVE FREE 20 ML: 5 INJECTION INTRAVENOUS at 09:27

## 2021-06-02 RX ADMIN — MONTELUKAST 10 MG: 10 TABLET, FILM COATED ORAL at 20:39

## 2021-06-02 RX ADMIN — BUDESONIDE AND FORMOTEROL FUMARATE DIHYDRATE 1 PUFF: 160; 4.5 AEROSOL RESPIRATORY (INHALATION) at 10:04

## 2021-06-02 RX ADMIN — SODIUM CHLORIDE, PRESERVATIVE FREE 10 ML: 5 INJECTION INTRAVENOUS at 15:34

## 2021-06-02 RX ADMIN — SODIUM CHLORIDE, PRESERVATIVE FREE 10 ML: 5 INJECTION INTRAVENOUS at 20:59

## 2021-06-02 RX ADMIN — PANTOPRAZOLE SODIUM 40 MG: 40 TABLET, DELAYED RELEASE ORAL at 06:08

## 2021-06-02 RX ADMIN — INSULIN LISPRO 2 UNITS: 100 INJECTION, SOLUTION INTRAVENOUS; SUBCUTANEOUS at 17:04

## 2021-06-02 RX ADMIN — FERROUS SULFATE TAB EC 325 MG (65 MG FE EQUIVALENT) 325 MG: 325 (65 FE) TABLET DELAYED RESPONSE at 17:05

## 2021-06-02 RX ADMIN — INSULIN LISPRO 2 UNITS: 100 INJECTION, SOLUTION INTRAVENOUS; SUBCUTANEOUS at 20:53

## 2021-06-02 RX ADMIN — TIZANIDINE 2 MG: 2 TABLET ORAL at 09:26

## 2021-06-02 RX ADMIN — FLUTICASONE PROPIONATE 1 SPRAY: 50 SPRAY, METERED NASAL at 09:35

## 2021-06-02 RX ADMIN — NORTRIPTYLINE HYDROCHLORIDE 10 MG: 10 CAPSULE ORAL at 20:58

## 2021-06-02 RX ADMIN — ONDANSETRON 4 MG: 2 INJECTION INTRAMUSCULAR; INTRAVENOUS at 12:39

## 2021-06-02 RX ADMIN — HYDROXYZINE HYDROCHLORIDE 25 MG: 25 TABLET, FILM COATED ORAL at 09:26

## 2021-06-02 RX ADMIN — GUAIFENESIN AND DEXTROMETHORPHAN 10 ML: 100; 10 SYRUP ORAL at 03:51

## 2021-06-02 RX ADMIN — Medication 1 MG: at 15:34

## 2021-06-02 RX ADMIN — HYDROXYZINE HYDROCHLORIDE 25 MG: 25 TABLET, FILM COATED ORAL at 20:39

## 2021-06-02 RX ADMIN — ALBUTEROL SULFATE 2.5 MG: 2.5 SOLUTION RESPIRATORY (INHALATION) at 14:11

## 2021-06-02 RX ADMIN — Medication 1 MG: at 09:34

## 2021-06-02 RX ADMIN — LORAZEPAM 0.25 MG: 0.5 TABLET ORAL at 20:40

## 2021-06-02 RX ADMIN — CETIRIZINE HYDROCHLORIDE 10 MG: 10 TABLET, FILM COATED ORAL at 09:26

## 2021-06-02 RX ADMIN — FUROSEMIDE 40 MG: 10 INJECTION, SOLUTION INTRAMUSCULAR; INTRAVENOUS at 12:01

## 2021-06-02 RX ADMIN — METHYLPREDNISOLONE SODIUM SUCCINATE 40 MG: 40 INJECTION, POWDER, FOR SOLUTION INTRAMUSCULAR; INTRAVENOUS at 01:57

## 2021-06-02 RX ADMIN — MIRTAZAPINE 15 MG: 15 TABLET, FILM COATED ORAL at 20:40

## 2021-06-02 RX ADMIN — Medication 1 MG: at 20:55

## 2021-06-02 RX ADMIN — ATORVASTATIN CALCIUM 40 MG: 40 TABLET, FILM COATED ORAL at 20:40

## 2021-06-02 RX ADMIN — METHYLPREDNISOLONE SODIUM SUCCINATE 40 MG: 40 INJECTION, POWDER, FOR SOLUTION INTRAMUSCULAR; INTRAVENOUS at 09:40

## 2021-06-02 RX ADMIN — GUAIFENESIN AND DEXTROMETHORPHAN 10 ML: 100; 10 SYRUP ORAL at 20:39

## 2021-06-02 RX ADMIN — BUPROPION HYDROCHLORIDE 150 MG: 150 TABLET, EXTENDED RELEASE ORAL at 09:26

## 2021-06-02 RX ADMIN — TIZANIDINE 2 MG: 2 TABLET ORAL at 20:39

## 2021-06-02 RX ADMIN — Medication 2000 UNITS: at 17:04

## 2021-06-02 RX ADMIN — FUROSEMIDE 40 MG: 40 TABLET ORAL at 17:05

## 2021-06-02 RX ADMIN — BUDESONIDE AND FORMOTEROL FUMARATE DIHYDRATE 1 PUFF: 160; 4.5 AEROSOL RESPIRATORY (INHALATION) at 18:00

## 2021-06-02 ASSESSMENT — PAIN SCALES - GENERAL
PAINLEVEL_OUTOF10: 0
PAINLEVEL_OUTOF10: 9
PAINLEVEL_OUTOF10: 8
PAINLEVEL_OUTOF10: 8
PAINLEVEL_OUTOF10: 5
PAINLEVEL_OUTOF10: 8
PAINLEVEL_OUTOF10: 7
PAINLEVEL_OUTOF10: 0
PAINLEVEL_OUTOF10: 5

## 2021-06-02 ASSESSMENT — PAIN DESCRIPTION - ONSET
ONSET: ON-GOING
ONSET: ON-GOING

## 2021-06-02 ASSESSMENT — PAIN DESCRIPTION - LOCATION
LOCATION: ABDOMEN
LOCATION: BACK;SHOULDER
LOCATION: ABDOMEN

## 2021-06-02 ASSESSMENT — PAIN DESCRIPTION - DESCRIPTORS
DESCRIPTORS: ACHING;DISCOMFORT
DESCRIPTORS: ACHING;DISCOMFORT

## 2021-06-02 ASSESSMENT — PAIN DESCRIPTION - PAIN TYPE
TYPE: CHRONIC PAIN;NEUROPATHIC PAIN
TYPE: ACUTE PAIN
TYPE: ACUTE PAIN

## 2021-06-02 ASSESSMENT — PAIN DESCRIPTION - FREQUENCY
FREQUENCY: CONTINUOUS
FREQUENCY: CONTINUOUS

## 2021-06-02 ASSESSMENT — PAIN DESCRIPTION - PROGRESSION
CLINICAL_PROGRESSION: GRADUALLY WORSENING
CLINICAL_PROGRESSION: GRADUALLY WORSENING

## 2021-06-02 ASSESSMENT — PAIN DESCRIPTION - ORIENTATION: ORIENTATION: RIGHT;LEFT

## 2021-06-02 NOTE — PLAN OF CARE
Problem: Falls - Risk of:  Goal: Will remain free from falls  Description: Will remain free from falls  6/2/2021 1209 by Fartun Ricci RN  Outcome: Ongoing  Note: Bed in lowest position, call light within reach, hourly rounding fallen star, bed/chair alarm  6/2/2021 1208 by Fartun Ricci RN  Outcome: Ongoing  6/1/2021 2317 by Kathryn Gracia RN  Outcome: Ongoing

## 2021-06-02 NOTE — PROGRESS NOTES
05/01/20 1100   Activity/Group Checklist   Group Other (Comment)  ([facility] - Music Appreciation)   Attendance Did not attend     Patient was personally prompted to attend [facility] today, focused on one of his favorite topics, music  Patient declined, did not respond to therapist from bed  Physical Therapy  Facility/Department: [facility]  Daily Treatment Note  NAME: Sergio Sandoval  : 1945  MRN: [id number]    Date of Service: 2021    Discharge Recommendations:  Patient would benefit from continued therapy after discharge   Pt currently functioning below baseline. Would suggest additional therapy at time of discharge to maximize long term outcomes and prevent re-admission. Please refer to AM-PAC score for current level of function. PT Equipment Recommendations  Equipment Needed: No    Assessment   Body structures, Functions, Activity limitations: Decreased functional mobility ; Decreased safe awareness;Decreased balance;Decreased endurance;Decreased strength  Assessment: Patient demo decreased safety with gait and transfers with limited aerobic capacity. Prognosis: Good  PT Education: Goals;PT Role;Plan of Care;Transfer Training;General Safety; Disease Specific Education  Patient Education: Patient educated to importance of being OOB, patient not receptive to education. Activity Tolerance  Activity Tolerance: Patient limited by fatigue;Patient limited by endurance  Activity Tolerance: Significantly SOB with minimal activty and impulsive during mobility. Patient Diagnosis(es): The primary encounter diagnosis was Gastrointestinal hemorrhage, unspecified gastrointestinal hemorrhage type. A diagnosis of Pancolitis ([facility] [address] was also pertinent to this visit. has a past medical history of ASHLEIGH (acute kidney injury) ([address] Arthritis, Asthma, Bipolar 1 disorder ([address] CHF (congestive heart failure) ([address] COPD (chronic obstructive pulmonary disease) ([address] Depression, Diabetes mellitus ([address] Fibromyalgia, GERD (gastroesophageal reflux disease), Hyperlipidemia, Hypertension, and Pneumonia. has a past surgical history that includes Cholecystectomy; Hysterectomy; Tonsillectomy; Knee Arthroplasty (Left);  Foot fracture surgery (Left); pr egd transoral biopsy single/multiple (N/A, 2017); Assistance; 2 Assistance  Stand to sit: Minimal Assistance; 2 Assistance  Bed to Chair: Minimal assistance; 2 [address]; 2 Assistance  Lateral Transfers: Minimal Assistance; 2 assistance  [place] transfer: Minimal Assistance; 2 Assistance (Vcs for hand placement and safety)  Comment: Patient impulsive with transfer, not giving therapist time to organize 02 tubing. Patient demo decreased safety with transfer. Ambulation  Ambulation?: Yes  More Ambulation?: No  Ambulation 1  Surface: level tile  Device: Rollator  Assistance: Minimal assistance; 2 Assistance  Quality of Gait: Lateral WSing during gait with decreased step height and length. Impulsive  Gait Deviations: Slow Dona; Increased JYOTI; Decreased step length;Decreased step height  Distance: 10' x 2  Comment: Assist for balance/coordination and line mgmt. Stairs/Curb  Stairs?: No   Patient   Balance  Sitting - Static: Good  Sitting - Dynamic: Good  Standing - Static: Fair  Standing - Dynamic: Fair  Exercises  Comments: Reviewed seated mary LE AROM x 10 reps with several rest breaks required due to SOB, SpO2 100%. Created, issued and reviewed seated HEP with patient. Access Code: [email or web address]: [employer]/Prepared by: Klaudia Beavers  Exercises    Seated Scapular Retraction - 1 x daily - 7 x weekly - 10 reps - 3 sets    Seated Ankle Pumps - 1 x daily - 7 x weekly - 10 reps - 3 sets    Seated Gluteal Sets - 1 x daily - 7 x weekly - 10 reps - 3 sets    Seated Long Arc Quad - 1 x daily - 7 x weekly - 10 reps - 3 sets    Seated March - 1 x daily - 7 x weekly - 10 reps - 3 sets    Seated Hip Abduction - 1 x daily - 7 x weekly - 10 reps - 3 sets    Seated Pursed Lip Breathing - 1 x daily - 7 x weekly - 10 reps - 3 sets    AM-PAC Score  AM-PAC Inpatient Mobility Raw Score : 17 (06/02/21 1320)  AM-PAC Inpatient T-Scale Score : 42.13 (06/02/21 1320)  Mobility Inpatient CMS 0-100% Score: 50.57 (06/02/21

## 2021-06-02 NOTE — PLAN OF CARE
Nutrition Problem #1: Altered GI function  Intervention: Food and/or Nutrient Delivery: Continue Current Diet, Start Oral Nutrition Supplement  Nutritional Goals: Advance diet in the next 2 days or consider nutrition support

## 2021-06-02 NOTE — CARE COORDINATION
[facility] bhandari approved patient for admission and began precert.  Saul Schilling

## 2021-06-02 NOTE — PROGRESS NOTES
GI Progress notes    6/2/2021   10:15 AM    Name:  Krystina Miller  MRN:    [id number]     Sanket:     [de-identified]   Room:  2006/2006-02  IP Day: 8     Admit Date: 5/25/2021 12:33 PM  PCP: Cricket Robles DO    Subjective:     C/C:   Chief Complaint   Patient presents with    GI Bleeding       Interval History: Status: improved. Improving since yesterday  Had drop in hemoglobin last night  Was given 2 units  Over appeared of last 24 hours she had no overt rectal bleeding  Complains of some mild abdominal discomfort  Currently sitting up in the chair  Is little short of breath  EGD and sigmoidoscopy findings were explained to her  Mild abdominal bloating no significant pain  Continues to be an IV steroid therapy  ROS:  Constitutional: negative for chills, fevers and sweats    Gastrointestinal: Positive abdominal pain, constipation, positive diarrhea, nausea and vomiting  Neurological: Mild dizziness and headaches    Medications: Allergies:    Allergies   Allergen Reactions    Aspirin     Ibuprofen     Sulfa Antibiotics     Tape Ezella Aquas Tape]     Lyrica [Pregabalin] Other (See Comments)     \"made her goofy\"       Current Meds: 0.9 % sodium chloride infusion, PRN  guaiFENesin-dextromethorphan (ROBITUSSIN DM) 100-10 MG/5ML syrup 10 mL, Q4H PRN  0.9 % sodium chloride infusion, PRN  0.9 % sodium chloride infusion, PRN  albuterol (PROVENTIL) nebulizer solution 2.5 mg, Q6H PRN  0.9 % sodium chloride infusion, PRN  0.9 % sodium chloride infusion, PRN  [Held by provider] insulin detemir (LEVEMIR) injection pen 10 Units, BID  methylPREDNISolone sodium (SOLU-MEDROL) injection 40 mg, Q8H  albuterol (PROVENTIL) nebulizer solution 2.5 mg, 4x daily  sodium chloride flush 0.9 % injection 10 mL, 2 times per day  sodium chloride flush 0.9 % injection 10 mL, PRN  [Held by provider] furosemide (LASIX) tablet 40 mg, BID  montelukast (SINGULAIR) tablet 10 mg, Nightly  insulin lispro (HUMALOG) injection vial 0-12 Units, TID WC  insulin lispro (HUMALOG) injection vial 0-6 Units, Nightly  glucose (GLUTOSE) 40 % oral gel 15 g, PRN  dextrose 50 % IV solution, PRN  glucagon (rDNA) injection 1 mg, PRN  dextrose 5 % solution, PRN  sodium chloride flush 0.9 % injection 5-40 mL, Q12H  HYDROmorphone HCl PF (DILAUDID) injection 1 mg, Q3H PRN  atorvastatin (LIPITOR) tablet 40 mg, Nightly  budesonide-formoterol (SYMBICORT) 160-4.5 MCG/ACT inhaler 1 puff, BID  Vitamin D (CHOLECALCIFEROL) tablet 2,000 Units, Dinner  ferrous sulfate (FE TABS 325) EC tablet 325 mg, Dinner  fluticasone (FLONASE) 50 MCG/ACT nasal spray 1 spray, Daily  hydrOXYzine (ATARAX) tablet 25 mg, BID  loperamide (IMODIUM) capsule 2 mg, PRN  magnesium oxide (MAG-OX) tablet 400 mg, BID  mirtazapine (REMERON) tablet 15 mg, Nightly  pantoprazole (PROTONIX) tablet 40 mg, QAM AC  tiotropium (SPIRIVA RESPIMAT) 2.5 MCG/ACT inhaler 2 puff, Daily  tiZANidine (ZANAFLEX) tablet 2 mg, BID  sodium chloride flush 0.9 % injection 5-40 mL, PRN  0.9 % sodium chloride infusion, PRN  promethazine (PHENERGAN) tablet 12.5 mg, Q6H PRN   Or  ondansetron (ZOFRAN) injection 4 mg, Q6H PRN  acetaminophen (TYLENOL) tablet 650 mg, Q6H PRN   Or  acetaminophen (TYLENOL) suppository 650 mg, Q6H PRN  albuterol sulfate  (90 Base) MCG/ACT inhaler 2 puff, Q6H PRN  buPROPion (WELLBUTRIN XL) extended release tablet 150 mg, QAM  DULoxetine (CYMBALTA) extended release capsule 90 mg, Daily  LORazepam (ATIVAN) tablet 0.25 mg, BID PRN  nortriptyline (PAMELOR) capsule 10 mg, Nightly  cetirizine (ZYRTEC) tablet 10 mg, Daily  [Held by provider] alogliptin (NESINA) tablet 12.5 mg, Daily        Data:     Code Status:  Full Code    Family History   Problem Relation Age of Onset    Heart Failure Mother     Cancer Sister     Cancer Maternal Grandmother     Heart Failure Maternal Grandmother        Social History     Socioeconomic History    Marital status:       Spouse name: Not on file    Number of children: Not on file    Years of education: Not on file    Highest education level: Not on file   Occupational History    Not on file   Tobacco Use    Smoking status: Heavy Tobacco Smoker     Packs/day: 1.00     Years: 40.00     Pack years: 40.00    Smokeless tobacco: Never Used   Substance and Sexual Activity    Alcohol use: No    Drug use: No    Sexual activity: Not on file   Other Topics Concern    Not on file   Social History Narrative    Not on file     Social Determinants of Health     Financial Resource Strain:     Difficulty of Paying Living Expenses:    Food Insecurity:     Worried About Running Out of Food in the Last Year:     Ran Out of Food in the Last Year:    Transportation Needs:     Lack of Transportation (Medical):  Lack of Transportation (Non-Medical):    Physical Activity:     Days of Exercise per Week:     Minutes of Exercise per Session:    Stress:     Feeling of Stress :    Social Connections:     Frequency of Communication with Friends and Family:     Frequency of Social Gatherings with Friends and Family:     Attends Yazidi Services:     Active Member of Clubs or Organizations:     Attends Club or Organization Meetings:     Marital Status:    Intimate Partner Violence:     Fear of Current or Ex-Partner:     Emotionally Abused:     Physically Abused:     Sexually Abused:        Vitals:  /68   Pulse 72   Temp 97.6 °F (36.4 °C) (Oral)   Resp 18   Ht 4' 7\" (1.397 m)   Wt 205 lb 0.4 oz (93 kg)   SpO2 100%   BMI 47.65 kg/m²   Temp (24hrs), Av.3 °F (36.8 °C), Min:97.5 °F (36.4 °C), Max:99.1 °F (37.3 °C)    Recent Labs     21  1150 21  1617 21  0607   POCGLU 192* 246* 193* 172*       I/O (24Hr):     Intake/Output Summary (Last 24 hours) at 2021 1015  Last data filed at 2021 0717  Gross per 24 hour   Intake 1861 ml   Output 605 ml   Net 1256 ml       Labs:      CBC:   Lab Results   Component Value Date    WBC 20.9

## 2021-06-02 NOTE — PROGRESS NOTES
Occupational Therapy  Facility/Department: [facility] MED SURG  Daily Treatment Note  NAME: Arabella Courser  : 1945  MRN: [id number]    Date of Service: 2021    Discharge Recommendations:  Patient would benefit from continued therapy after discharge   Pt currently functioning below baseline. Would suggest additional therapy at time of discharge to maximize long term outcomes and prevent re-admission. Please refer to AM-PAC score for current level of function. Assessment   Performance deficits / Impairments: Decreased functional mobility ; Decreased safe awareness;Decreased ADL status; Decreased strength;Decreased ROM; Decreased endurance;Decreased sensation;Decreased fine motor control;Decreased high-level IADLs;Decreased posture;Decreased cognition;Decreased balance  Assessment: Skilled OT services are indicated to increase I and safety during functional tasks to return home at prior level of function as able. Prognosis: Fair;Good  REQUIRES OT FOLLOW UP: Yes  Activity Tolerance  Activity Tolerance: Patient Tolerated treatment well;Treatment limited secondary to medical complications (free text)  Activity Tolerance: Poor +  Safety Devices  Safety Devices in place: Yes  Type of devices: Left in chair;Call light within reach;Nurse notified; Chair alarm in place;Gait belt;Patient at risk for falls         Patient Diagnosis(es): The primary encounter diagnosis was Gastrointestinal hemorrhage, unspecified gastrointestinal hemorrhage type. A diagnosis of Pancolitis ([facility] was also pertinent to this visit. has a past medical history of ASHLEIGH (acute kidney injury) ([address] Arthritis, Asthma, Bipolar 1 disorder ([address] CHF (congestive heart failure) ([address] COPD (chronic obstructive pulmonary disease) ([address] Depression, Diabetes mellitus ([address] Fibromyalgia, GERD (gastroesophageal reflux disease), Hyperlipidemia, Hypertension, and Pneumonia. has a past surgical history that includes Cholecystectomy;  Hysterectomy; Tonsillectomy; Knee Arthroplasty (Left); Foot fracture surgery (Left); pr egd transoral biopsy single/multiple (N/A, 2/14/2017); pr colsc flx w/rmvl of tumor polyp lesion snare tq (6/26/2017); Colonoscopy (6/26/2017); Upper gastrointestinal endoscopy (N/A, 5/26/2021); Colonoscopy (N/A, 5/26/2021); IR INSERT PICC VAD W SQ PORT >5 YEARS (5/28/2021); Upper gastrointestinal endoscopy (N/A, 6/1/2021); and sigmoidoscopy (N/A, 6/1/2021). Restrictions  Restrictions/Precautions  Restrictions/Precautions: General Precautions, Up as Tolerated  Required Braces or Orthoses?: No  Position Activity Restriction  Other position/activity restrictions: clear liquid diet, telemetry, LUE IV, 02  Subjective   General  Chart Reviewed: Yes  Patient assessed for rehabilitation services?: Yes  Response to previous treatment: Patient with no complaints from previous session  Family / Caregiver Present: No  Subjective  Subjective: Pt up in chair upon arrival, agreeable to therapy. Orientation  Orientation  Overall Orientation Status: Within Functional Limits  Objective    ADL  Toileting: Dependent/Total (for brief change and hygiene after BM x2 staff assist for safety)  Additional Comments: Pt has is limited with ADLS d/t decreased B shld AROM. Balance  Sitting Balance: Supervision  Standing Balance: Minimal assistance  Standing Balance  Time: standing rosa  ~ 1-2 min  Activity: functional mobility, toileting  Functional Mobility  Functional - Mobility Device: 4-Wheeled Walker  Activity: To/from bathroom  Assist Level: Minimal assistance (x2)  Functional Mobility Comments: Pt completed functional mobility to/from bathroom using 4WW with Min A x2 for safety/balance and assist with O2 line mgmt. Pt impulsive at times. Toilet Transfers  Toilet - Technique: Ambulating  Equipment Used: Standard toilet  Toilet Transfer: Minimal assistance;2 Person assistance  Toilet Transfers Comments: Min cues for safety and hand placement. care.  Short term goal 5: ADL transfers and functional mobility to Mod I with use of AD as needed. Long term goals  Long term goal 1: Pt to demo increase standing rosa > 8 min with Min A using AD as needed to reduce fall risk during functional tasks. Long term goal 2: Pt to be I with fall prevention education, EC/WS tech, and recommendations for AE with use of handouts as needed. Patient Goals   Patient goals : To go home! Therapy Time   Individual Concurrent Group Co-treatment   Time In 1029     2481   Time Out 6554     4279   Minutes 10     14        Co-treatment with PT warranted secondary to decreased safety and independence requiring 2 skilled therapy professionals to address individual discipline's goals. OT addressing \"preparation for ADL transfer\",\"sitting balance for increased ADL performance\",\"sitting/activity tolerance\",\"functional reaching\",\"environmental safety/scanning\",\"fall prevention\",\"functional mobility for ADL transfers\",\"ability to sequence and follow directions\",\"functional UE strength\". NEIL Potter        Access Code: [email or web address]: 06/02/2021Prepared by: Exercises   Thumb Opposition - 1 x daily - 7 x weekly - 10 reps - 3 sets   Seated Forearm Pronation and Supination AROM - 1 x daily - 7 x weekly - 10 reps - 3 sets   Wrist AROM Flexion Extension - 1 x daily - 7 x weekly - 10 reps - 3 sets   Seated Elbow Flexion and Extension AROM - 1 x daily - 7 x weekly - 10 reps - 3 sets   Seated Shoulder Flexion - 1 x daily - 7 x weekly - 10 reps - 3 sets  Patient Education   Understanding Energy Conservation   Energy Conservation During Daily Tasks   What Is Heart Failure?    Managing Heart Failure: Heart-Healthy Diet   Managing Heart Failure: Heart-Healthy Lifestyle Choices   My Heart Failure Action Plan

## 2021-06-02 NOTE — PROGRESS NOTES
[facility]  Office: [address], DO, Moni Brito, DO, Meeraemani Larsen, DO, Jose Taylor Blood, DO, Tan Nicole MD, Inna Cheung MD, Anai Guo MD, Bonnielee Nissen, MD, Claudia Osorio MD, Lance Jefferson MD, Constance Jacobson MD, Kelin Real MD, Edinson Person, DO, Carolina Castillo MD, Porfirio Gann, DO, Jo Mendoza MD,  Mayra Farris, DO, Annmarie Tanner MD, Ilia Aldrich MD, Roberto Motley MD, Emily Perez MD, Blanca Mon, [facility], [facility], CNP, Christelle Martin, CNP, Ngoc Salguero, CNS, Anna Ibrahim, CNP, Tate Girard, CNP, Lamine Duarte, CNP, Rosenda Piña, CNP, Emigdio Ferguson, CNP, CISCO Asher-C, Frederick Mao, [facility], Kalpana Hernandez, CNP, Navin Madsen, CNP, Ting Obrien, CNP, Haseeb Reaves, CNP, Abi Christianson, CNP, [facility]    Progress Note    6/2/2021    11:22 AM    Name:   Kwame Haji  MRN:     [id number]     Kristian Barillas:      [de-identified]   Room:   2006/2006-02  IP Day:  8  Admit Date:  5/25/2021 12:33 PM    PCP:   Federica Scanlon DO  Code Status:  Full Code    Subjective:     C/C:   Chief Complaint   Patient presents with    GI Bleeding     Interval History Status: worsened. Feels ok today but did have some bright red blood per rectum this am    Also has some sob this am    Required additional couple units prbc overnight for hgb<6    6/1 egd showed gastritis and colonoscopy showed black, pasty, tarry stool    Brief History:     Per the NP:  \"5/25 -patient has a history of A. fib on Eliquis.  Over the past 3 days patient has noted bright red blood per rectum.  Patient reports that during that same timeLeonor Shelby has began experiencing worsening fatigue and shortness of breath.  Patient is now to the point where she has severe shortness of breath with minimal exertion and severe dizziness and weakness while rising.  Patient continues to experience dark red stools mixed with bright red blood and liquid diarrhea.  CT of the abdomen shows pancolitis. GI consulted.      5/26 -patient received blood transfusions. Reportedly going for EGD and colonoscopy today. Stool culture is pending.     5/27 - Little change. Steroids started per GI following scopes. Inflammatory bowel disease with diffuse colitis noted in GI note. Still passing blood and has now required 5 units of blood.  GI completed EGD and colonoscopy yesterday and showed diffuse colitis. Patient continues to endorse abdominal discomfort. Steroids started per GI following scopes. Inflammatory bowel disease with diffuse colitis noted in GI note. Still passing blood and has now required 5 units of blood. \"     5/28-5/31- Patient has had continued bloody stools with clots and needed a total of 15 units PRBCs transfused. Review of Systems:     Constitutional:  negative for chills, fevers, sweats  Respiratory:  positive for dyspnea on exertion, shortness of breath  Cardiovascular:  negative for chest pain, chest pressure/discomfort, lower extremity edema, palpitations  Gastrointestinal:  negative for abdominal pain, constipation, diarrhea, nausea, vomiting  Neurological:  negative for dizziness, headache    Medications: Allergies:     Allergies   Allergen Reactions    Aspirin     Ibuprofen     Sulfa Antibiotics     Tape Stefan Sers Tape]     Lyrica [Pregabalin] Other (See Comments)     \"made her goofy\"       Current Meds:   Scheduled Meds:    [Held by provider] insulin detemir  10 Units Subcutaneous BID    methylPREDNISolone  40 mg Intravenous Q8H    albuterol  2.5 mg Nebulization 4x daily    sodium chloride flush  10 mL Intravenous 2 times per day    [Held by provider] furosemide  40 mg Oral BID    montelukast  10 mg Oral Nightly    insulin lispro  0-12 Units Subcutaneous TID     insulin lispro  0-6 Units Subcutaneous Nightly    sodium chloride flush  5-40 mL Intravenous Q12H    atorvastatin  40 mg Oral Nightly    budesonide-formoterol  1 puff Inhalation BID    Vitamin D  2,000 Units Oral Dinner    ferrous sulfate  325 mg Oral Dinner    fluticasone  1 spray Nasal Daily    hydrOXYzine  25 mg Oral BID    magnesium oxide  400 mg Oral BID    mirtazapine  15 mg Oral Nightly    pantoprazole  40 mg Oral QAM AC    tiotropium  2 puff Inhalation Daily    tiZANidine  2 mg Oral BID    buPROPion  150 mg Oral QAM    DULoxetine  90 mg Oral Daily    nortriptyline  10 mg Oral Nightly    cetirizine  10 mg Oral Daily    [Held by provider] alogliptin  12.5 mg Oral Daily     Continuous Infusions:    sodium chloride      sodium chloride      sodium chloride      sodium chloride      sodium chloride 125 mL/hr at 06/01/21 7929    dextrose      sodium chloride       PRN Meds: sodium chloride, guaiFENesin-dextromethorphan, sodium chloride, sodium chloride, albuterol, sodium chloride, sodium chloride, sodium chloride flush, glucose, dextrose, glucagon (rDNA), dextrose, HYDROmorphone, loperamide, sodium chloride flush, sodium chloride, promethazine **OR** ondansetron, acetaminophen **OR** acetaminophen, albuterol sulfate HFA, LORazepam    Data:     Past Medical History:   has a past medical history of ASHLEIGH (acute kidney injury) ([facility] [address] Arthritis, Asthma, Bipolar 1 disorder ([facility] [address] CHF (congestive heart failure) ([facility] [address] COPD (chronic obstructive pulmonary disease) ([facility] [address] Depression, [facility] Fibromyalgia, GERD (gastroesophageal reflux disease), Hyperlipidemia, Hypertension, and Pneumonia. Social History:   reports that she has been smoking. She has a 40.00 pack-year smoking history. She has never used smokeless tobacco. She reports that she does not drink alcohol and does not use drugs.      Family History:   Family History   Problem Relation Age of Onset    Heart Failure Mother     Cancer Sister     Cancer Maternal Grandmother     Heart Failure Maternal Grandmother        Vitals:  /64   Pulse 82   Temp 97.9 Additional Contrast? None    Result Date: 5/25/2021  Acute nonspecific pancolitis. Infectious or inflammatory colitis can be considered, less likely ischemic since the major visceral arteries appear patent. XR CHEST PORTABLE    Result Date: 5/26/2021  Subtle bibasilar infiltrates representing atelectasis versus pneumonia. XR CHEST 1 VIEW    Result Date: 5/29/2021  Minimal left basilar atelectasis. Otherwise, clear lungs. Cardiomegaly. IR INSERT PICC VAD W SQ PORT >5 YEARS    Result Date: 5/28/2021  Successful ultrasound and fluoroscopy guided right basilic vein 5 Mongolian power injectable dual-lumen PICC placement. Ready for use.        Physical Examination:        General appearance:  alert, cooperative and no distress  Mental Status:  oriented to person, place and time and normal affect  Lungs:  crackles bilaterally, normal effort  Heart:  regular rate and rhythm, no murmur  Abdomen:  soft, nontender, nondistended, normal bowel sounds, no masses, hepatomegaly, splenomegaly; obese  Extremities:  no edema, redness, tenderness in the calves  Skin:  no gross lesions, rashes, induration    Assessment:        Hospital Problems         Last Modified POA    * (Principal) GI (gastrointestinal bleed) 5/25/2021 Yes    Chronic obstructive pulmonary disease with acute exacerbation ([address] 5/25/2021 Yes    ASHLEIGH (acute kidney injury) ([address] 5/25/2021 Yes    Type 2 diabetes mellitus without complication, without long-term current use of insulin ([address] 5/25/2021 Yes    Bipolar 1 disorder ([address] 5/25/2021 Yes    BRBPR (bright red blood per rectum) 5/25/2021 Yes    Acute colitis 5/25/2021 Yes    Anemia due to blood loss, acute 5/25/2021 Yes    Chronic diastolic congestive heart failure ([address] 6/1/2021 Yes    Pancolitis ([address] 5/26/2021 Yes    Morbid obesity with BMI of 40.0-44.9, adult ([address] 6/1/2021 Yes      acute pulm edema from multiple transfusions; has dhf    Plan:        Keep on clears  Monitor h/h closely; transfuse prn, has had 15u prbc thus far and 2u ffp  A dose of lasix iv now  Resume po lasix tonight  Also resume some other meds that had been held  No chemical dvt prophylaxis due to ongoing bleeding  On steroids per GI    Telly Li, DO  6/2/2021  11:22 AM

## 2021-06-03 LAB
ANION GAP SERPL CALCULATED.3IONS-SCNC: 9 MMOL/L (ref 9–17)
BUN BLDV-MCNC: 25 MG/DL (ref 8–23)
BUN/CREAT BLD: 20 (ref 9–20)
CALCIUM SERPL-MCNC: 6.5 MG/DL (ref 8.6–10.4)
CHLORIDE BLD-SCNC: 108 MMOL/L (ref 98–107)
CO2: 25 MMOL/L (ref 20–31)
CREAT SERPL-MCNC: 1.27 MG/DL (ref 0.5–0.9)
GFR AFRICAN AMERICAN: 50 ML/MIN
GFR NON-AFRICAN AMERICAN: 41 ML/MIN
GFR SERPL CREATININE-BSD FRML MDRD: ABNORMAL ML/MIN/{1.73_M2}
GFR SERPL CREATININE-BSD FRML MDRD: ABNORMAL ML/MIN/{1.73_M2}
GLUCOSE BLD-MCNC: 104 MG/DL (ref 65–105)
GLUCOSE BLD-MCNC: 115 MG/DL (ref 70–99)
GLUCOSE BLD-MCNC: 116 MG/DL (ref 65–105)
GLUCOSE BLD-MCNC: 137 MG/DL (ref 65–105)
GLUCOSE BLD-MCNC: 320 MG/DL (ref 65–105)
HCT VFR BLD CALC: 17.2 % (ref 36.3–47.1)
HCT VFR BLD CALC: 21 % (ref 36.3–47.1)
HCT VFR BLD CALC: 23.2 % (ref 36.3–47.1)
HCT VFR BLD CALC: 23.4 % (ref 36.3–47.1)
HCT VFR BLD CALC: 25 % (ref 36.3–47.1)
HEMOGLOBIN: 5.3 G/DL (ref 11.9–15.1)
HEMOGLOBIN: 6.7 G/DL (ref 11.9–15.1)
HEMOGLOBIN: 7 G/DL (ref 11.9–15.1)
HEMOGLOBIN: 7.2 G/DL (ref 11.9–15.1)
HEMOGLOBIN: 7.8 G/DL (ref 11.9–15.1)
POTASSIUM SERPL-SCNC: 3.7 MMOL/L (ref 3.7–5.3)
SODIUM BLD-SCNC: 142 MMOL/L (ref 135–144)

## 2021-06-03 PROCEDURE — 2700000000 HC OXYGEN THERAPY PER DAY

## 2021-06-03 PROCEDURE — 6360000002 HC RX W HCPCS: Performed by: INTERNAL MEDICINE

## 2021-06-03 PROCEDURE — 99232 SBSQ HOSP IP/OBS MODERATE 35: CPT | Performed by: INTERNAL MEDICINE

## 2021-06-03 PROCEDURE — 6370000000 HC RX 637 (ALT 250 FOR IP): Performed by: INTERNAL MEDICINE

## 2021-06-03 PROCEDURE — 97530 THERAPEUTIC ACTIVITIES: CPT

## 2021-06-03 PROCEDURE — 85014 HEMATOCRIT: CPT

## 2021-06-03 PROCEDURE — 94640 AIRWAY INHALATION TREATMENT: CPT

## 2021-06-03 PROCEDURE — 94761 N-INVAS EAR/PLS OXIMETRY MLT: CPT

## 2021-06-03 PROCEDURE — 85018 HEMOGLOBIN: CPT

## 2021-06-03 PROCEDURE — 2580000003 HC RX 258: Performed by: INTERNAL MEDICINE

## 2021-06-03 PROCEDURE — 80048 BASIC METABOLIC PNL TOTAL CA: CPT

## 2021-06-03 PROCEDURE — 97110 THERAPEUTIC EXERCISES: CPT

## 2021-06-03 PROCEDURE — 6360000002 HC RX W HCPCS: Performed by: NURSE PRACTITIONER

## 2021-06-03 PROCEDURE — 1200000000 HC SEMI PRIVATE

## 2021-06-03 PROCEDURE — APPNB15 APP NON BILLABLE TIME 0-15 MINS: Performed by: NURSE PRACTITIONER

## 2021-06-03 PROCEDURE — 82947 ASSAY GLUCOSE BLOOD QUANT: CPT

## 2021-06-03 RX ORDER — HYDROCODONE BITARTRATE AND ACETAMINOPHEN 5; 325 MG/1; MG/1
1 TABLET ORAL EVERY 6 HOURS PRN
Status: DISCONTINUED | OUTPATIENT
Start: 2021-06-03 | End: 2021-06-04

## 2021-06-03 RX ORDER — LORAZEPAM 2 MG/ML
0.5 INJECTION INTRAMUSCULAR ONCE
Status: COMPLETED | OUTPATIENT
Start: 2021-06-03 | End: 2021-06-03

## 2021-06-03 RX ORDER — SODIUM CHLORIDE 9 MG/ML
INJECTION, SOLUTION INTRAVENOUS PRN
Status: DISCONTINUED | OUTPATIENT
Start: 2021-06-03 | End: 2021-06-05 | Stop reason: HOSPADM

## 2021-06-03 RX ORDER — LEVOFLOXACIN 500 MG/1
500 TABLET, FILM COATED ORAL DAILY
Status: DISCONTINUED | OUTPATIENT
Start: 2021-06-03 | End: 2021-06-03 | Stop reason: DRUGHIGH

## 2021-06-03 RX ORDER — LORAZEPAM 2 MG/ML
0.5 INJECTION INTRAMUSCULAR ONCE
Status: COMPLETED | OUTPATIENT
Start: 2021-06-04 | End: 2021-06-04

## 2021-06-03 RX ORDER — LEVOFLOXACIN 500 MG/1
500 TABLET, FILM COATED ORAL ONCE
Status: COMPLETED | OUTPATIENT
Start: 2021-06-03 | End: 2021-06-03

## 2021-06-03 RX ORDER — LEVOFLOXACIN 500 MG/1
250 TABLET, FILM COATED ORAL DAILY
Status: DISCONTINUED | OUTPATIENT
Start: 2021-06-04 | End: 2021-06-05 | Stop reason: HOSPADM

## 2021-06-03 RX ADMIN — METHYLPREDNISOLONE SODIUM SUCCINATE 40 MG: 40 INJECTION, POWDER, FOR SOLUTION INTRAMUSCULAR; INTRAVENOUS at 01:04

## 2021-06-03 RX ADMIN — SODIUM CHLORIDE, PRESERVATIVE FREE 10 ML: 5 INJECTION INTRAVENOUS at 23:23

## 2021-06-03 RX ADMIN — LORAZEPAM 0.25 MG: 0.5 TABLET ORAL at 13:28

## 2021-06-03 RX ADMIN — ALBUTEROL SULFATE 2.5 MG: 2.5 SOLUTION RESPIRATORY (INHALATION) at 10:53

## 2021-06-03 RX ADMIN — LEVOFLOXACIN 500 MG: 500 TABLET, FILM COATED ORAL at 12:26

## 2021-06-03 RX ADMIN — NORTRIPTYLINE HYDROCHLORIDE 10 MG: 10 CAPSULE ORAL at 21:25

## 2021-06-03 RX ADMIN — FUROSEMIDE 40 MG: 40 TABLET ORAL at 17:16

## 2021-06-03 RX ADMIN — HYDROXYZINE HYDROCHLORIDE 25 MG: 25 TABLET, FILM COATED ORAL at 21:25

## 2021-06-03 RX ADMIN — HYDROCODONE BITARTRATE AND ACETAMINOPHEN 1 TABLET: 5; 325 TABLET ORAL at 15:04

## 2021-06-03 RX ADMIN — HYDROXYZINE HYDROCHLORIDE 25 MG: 25 TABLET, FILM COATED ORAL at 09:04

## 2021-06-03 RX ADMIN — FUROSEMIDE 40 MG: 40 TABLET ORAL at 09:04

## 2021-06-03 RX ADMIN — PANTOPRAZOLE SODIUM 40 MG: 40 TABLET, DELAYED RELEASE ORAL at 06:39

## 2021-06-03 RX ADMIN — ATORVASTATIN CALCIUM 40 MG: 40 TABLET, FILM COATED ORAL at 21:25

## 2021-06-03 RX ADMIN — INSULIN LISPRO 8 UNITS: 100 INJECTION, SOLUTION INTRAVENOUS; SUBCUTANEOUS at 12:26

## 2021-06-03 RX ADMIN — LORAZEPAM 0.5 MG: 2 INJECTION, SOLUTION INTRAMUSCULAR; INTRAVENOUS at 01:04

## 2021-06-03 RX ADMIN — Medication 1 MG: at 10:23

## 2021-06-03 RX ADMIN — METHYLPREDNISOLONE SODIUM SUCCINATE 40 MG: 40 INJECTION, POWDER, FOR SOLUTION INTRAMUSCULAR; INTRAVENOUS at 08:58

## 2021-06-03 RX ADMIN — ALBUTEROL SULFATE 2.5 MG: 2.5 SOLUTION RESPIRATORY (INHALATION) at 18:06

## 2021-06-03 RX ADMIN — TIZANIDINE 2 MG: 2 TABLET ORAL at 21:25

## 2021-06-03 RX ADMIN — Medication 1 MG: at 06:39

## 2021-06-03 RX ADMIN — CETIRIZINE HYDROCHLORIDE 10 MG: 10 TABLET, FILM COATED ORAL at 09:04

## 2021-06-03 RX ADMIN — FERROUS SULFATE TAB EC 325 MG (65 MG FE EQUIVALENT) 325 MG: 325 (65 FE) TABLET DELAYED RESPONSE at 17:16

## 2021-06-03 RX ADMIN — HYDROCODONE BITARTRATE AND ACETAMINOPHEN 1 TABLET: 5; 325 TABLET ORAL at 21:25

## 2021-06-03 RX ADMIN — SODIUM CHLORIDE, PRESERVATIVE FREE 10 ML: 5 INJECTION INTRAVENOUS at 21:25

## 2021-06-03 RX ADMIN — MIRTAZAPINE 15 MG: 15 TABLET, FILM COATED ORAL at 21:25

## 2021-06-03 RX ADMIN — TIOTROPIUM BROMIDE INHALATION SPRAY 2 PUFF: 3.12 SPRAY, METERED RESPIRATORY (INHALATION) at 05:37

## 2021-06-03 RX ADMIN — BUPROPION HYDROCHLORIDE 150 MG: 150 TABLET, EXTENDED RELEASE ORAL at 09:04

## 2021-06-03 RX ADMIN — DULOXETINE HYDROCHLORIDE 90 MG: 60 CAPSULE, DELAYED RELEASE ORAL at 09:04

## 2021-06-03 RX ADMIN — MAGNESIUM GLUCONATE 500 MG ORAL TABLET 400 MG: 500 TABLET ORAL at 21:25

## 2021-06-03 RX ADMIN — MONTELUKAST 10 MG: 10 TABLET, FILM COATED ORAL at 21:25

## 2021-06-03 RX ADMIN — BUDESONIDE AND FORMOTEROL FUMARATE DIHYDRATE 1 PUFF: 160; 4.5 AEROSOL RESPIRATORY (INHALATION) at 18:07

## 2021-06-03 RX ADMIN — SODIUM CHLORIDE, PRESERVATIVE FREE 10 ML: 5 INJECTION INTRAVENOUS at 09:20

## 2021-06-03 RX ADMIN — FLUTICASONE PROPIONATE 1 SPRAY: 50 SPRAY, METERED NASAL at 09:25

## 2021-06-03 RX ADMIN — ALBUTEROL SULFATE 2.5 MG: 2.5 SOLUTION RESPIRATORY (INHALATION) at 05:35

## 2021-06-03 RX ADMIN — Medication 2000 UNITS: at 17:16

## 2021-06-03 RX ADMIN — SODIUM CHLORIDE, PRESERVATIVE FREE 10 ML: 5 INJECTION INTRAVENOUS at 09:05

## 2021-06-03 RX ADMIN — TIZANIDINE 2 MG: 2 TABLET ORAL at 09:04

## 2021-06-03 RX ADMIN — Medication 1 MG: at 01:09

## 2021-06-03 RX ADMIN — METHYLPREDNISOLONE SODIUM SUCCINATE 40 MG: 40 INJECTION, POWDER, FOR SOLUTION INTRAMUSCULAR; INTRAVENOUS at 17:16

## 2021-06-03 RX ADMIN — MAGNESIUM GLUCONATE 500 MG ORAL TABLET 400 MG: 500 TABLET ORAL at 09:04

## 2021-06-03 RX ADMIN — BUDESONIDE AND FORMOTEROL FUMARATE DIHYDRATE 1 PUFF: 160; 4.5 AEROSOL RESPIRATORY (INHALATION) at 05:36

## 2021-06-03 RX ADMIN — DICLOFENAC SODIUM 2 G: 10 GEL TOPICAL at 23:04

## 2021-06-03 RX ADMIN — SODIUM CHLORIDE, PRESERVATIVE FREE 10 ML: 5 INJECTION INTRAVENOUS at 09:03

## 2021-06-03 RX ADMIN — LOPERAMIDE HYDROCHLORIDE 2 MG: 2 CAPSULE ORAL at 22:45

## 2021-06-03 ASSESSMENT — PAIN DESCRIPTION - PAIN TYPE
TYPE: CHRONIC PAIN
TYPE: CHRONIC PAIN

## 2021-06-03 ASSESSMENT — PAIN SCALES - GENERAL
PAINLEVEL_OUTOF10: 9
PAINLEVEL_OUTOF10: 7
PAINLEVEL_OUTOF10: 9
PAINLEVEL_OUTOF10: 10
PAINLEVEL_OUTOF10: 7
PAINLEVEL_OUTOF10: 8
PAINLEVEL_OUTOF10: 10

## 2021-06-03 ASSESSMENT — PAIN DESCRIPTION - ONSET
ONSET: ON-GOING
ONSET: ON-GOING

## 2021-06-03 ASSESSMENT — PAIN DESCRIPTION - PROGRESSION
CLINICAL_PROGRESSION: NOT CHANGED

## 2021-06-03 ASSESSMENT — PAIN DESCRIPTION - DESCRIPTORS
DESCRIPTORS: STABBING
DESCRIPTORS: STABBING

## 2021-06-03 ASSESSMENT — PAIN DESCRIPTION - LOCATION
LOCATION: BACK;LEG;SHOULDER
LOCATION: BACK;SHOULDER

## 2021-06-03 ASSESSMENT — PAIN - FUNCTIONAL ASSESSMENT: PAIN_FUNCTIONAL_ASSESSMENT: PREVENTS OR INTERFERES SOME ACTIVE ACTIVITIES AND ADLS

## 2021-06-03 ASSESSMENT — PAIN DESCRIPTION - FREQUENCY
FREQUENCY: CONTINUOUS
FREQUENCY: CONTINUOUS

## 2021-06-03 ASSESSMENT — PAIN DESCRIPTION - ORIENTATION: ORIENTATION: RIGHT;LEFT

## 2021-06-03 NOTE — PLAN OF CARE
Problem: Falls - Risk of:  Goal: Will remain free from falls  Description: Will remain free from falls  Outcome: Ongoing  Note: Siderails up x 2  Hourly rounding  Call light in reach  Instructed to call for assist before attempting out of bed.   Remains free from falls and accidental injury at this time   Floor free from obstacles  Bed is locked and in lowest position  Adequate lighting provided  Bed alarm on, [place] star and Stay with Me signs posted      Goal: Absence of physical injury  Description: Absence of physical injury  Outcome: Ongoing     Problem: Skin Integrity:  Goal: Will show no infection signs and symptoms  Description: Will show no infection signs and symptoms  Outcome: Ongoing  Goal: Absence of new skin breakdown  Description: Absence of new skin breakdown  Outcome: Ongoing  Goal: Skin integrity will improve  Description: Skin integrity will improve  Outcome: Ongoing  Goal: Signs of wound healing will improve  Description: Signs of wound healing will improve  Outcome: Ongoing     Problem: Infection:  Goal: Will remain free from infection  Description: Will remain free from infection  Outcome: Ongoing     Problem: Safety:  Goal: Free from accidental physical injury  Description: Free from accidental physical injury  Outcome: Ongoing  Goal: Free from intentional harm  Description: Free from intentional harm  Outcome: Ongoing  Goal: Ability to remain free from injury will improve  Description: Ability to remain free from injury will improve  Outcome: Ongoing     Problem: Daily Care:  Goal: Daily care needs are met  Description: Daily care needs are met  Outcome: Ongoing     Problem: Pain:  Goal: Patient's pain/discomfort is manageable  Description: Patient's pain/discomfort is manageable  Outcome: Ongoing  Goal: Pain level will decrease  Description: Pain level will decrease  Outcome: Ongoing  Goal: Control of acute pain  Description: Control of acute pain  Outcome: Ongoing  Goal: Control of chronic pain  Description: Control of chronic pain  Outcome: Ongoing     Problem: Skin Integrity:  Goal: Skin integrity will stabilize  Description: Skin integrity will stabilize  Outcome: Ongoing     Problem: Discharge Planning:  Goal: Patients continuum of care needs are met  Description: Patients continuum of care needs are met  Outcome: Ongoing     Problem: Nutrition  Goal: Optimal nutrition therapy  Outcome: Ongoing     Problem: Discharge Planning:  Goal: Discharged to appropriate level of care  Description: Discharged to appropriate level of care  Outcome: Ongoing     Problem: Serum Glucose Level - Abnormal:  Goal: Ability to maintain appropriate glucose levels will improve  Description: Ability to maintain appropriate glucose levels will improve  Outcome: Ongoing     Problem: Sensory Perception - Impaired:  Goal: Ability to maintain a stable neurologic state will improve  Description: Ability to maintain a stable neurologic state will improve  Outcome: Ongoing     Problem: IP BALANCE  Goal: LTG - patient will maintain standing balance to allow for completion of daily activities  Outcome: Ongoing     Problem: IP BALANCE  Goal: LTG - Patient will maintain balance to allow for safe/functional mobility  Outcome: Ongoing     Problem: Bleeding:  Goal: Will show no signs and symptoms of excessive bleeding  Description: Will show no signs and symptoms of excessive bleeding  Outcome: Ongoing     Problem: Activity:  Goal: Fatigue will decrease  Description: Fatigue will decrease  Outcome: Ongoing  Goal: Ability to tolerate increased activity will improve  Description: Ability to tolerate increased activity will improve  Outcome: Ongoing  Goal: Ability to maintain optimal joint mobility will improve  Description: Ability to maintain optimal joint mobility will improve  Outcome: Ongoing     Problem:  Bowel/Gastric:  Goal: Ability to achieve a regular elimination pattern will improve  Description: Ability to achieve a regular elimination pattern will improve  Outcome: Ongoing     Problem: Cardiac:  Goal: Ability to maintain an adequate cardiac output will improve  Description: Ability to maintain an adequate cardiac output will improve  Outcome: Ongoing  Goal: Ability to maintain adequate ventilation will improve  Description: Ability to maintain adequate ventilation will improve  Outcome: Ongoing  Goal: Ability to achieve and maintain adequate cardiopulmonary perfusion will improve  Description: Ability to achieve and maintain adequate cardiopulmonary perfusion will improve  Outcome: Ongoing     Problem: Coping:  Goal: Ability to adjust to condition or change in health will improve  Description: Ability to adjust to condition or change in health will improve  Outcome: Ongoing  Goal: Communication of feelings regarding changes in body function or appearance will improve  Description: Communication of feelings regarding changes in body function or appearance will improve  Outcome: Ongoing     Problem: Health Behavior:  Goal: Identification of resources available to assist in meeting health care needs will improve  Description: Identification of resources available to assist in meeting health care needs will improve  Outcome: Ongoing     Problem: Nutritional:  Goal: Maintenance of adequate nutrition will improve  Description: Maintenance of adequate nutrition will improve  Outcome: Ongoing     Problem: Physical Regulation:  Goal: Will show no signs and symptoms of excessive bleeding  Description: Will show no signs and symptoms of excessive bleeding  Outcome: Ongoing  Goal: Signs and symptoms of infection will decrease  Description: Signs and symptoms of infection will decrease  Outcome: Ongoing  Goal: Complications related to the disease process, condition or treatment will be avoided or minimized  Description: Complications related to the disease process, condition or treatment will be avoided or minimized  Outcome: Ongoing Problem: Sensory:  Goal: Pain level will decrease  Description: Pain level will decrease  Outcome: Ongoing  Goal: General experience of comfort will improve  Description: General experience of comfort will improve  Outcome: Ongoing     Problem: Tissue Perfusion:  Goal: Ability to maintain a stable neurologic state will improve  Description: Ability to maintain a stable neurologic state will improve  Outcome: Ongoing  Goal: Ability to maintain adequate tissue perfusion will improve  Description: Ability to maintain adequate tissue perfusion will improve  Outcome: Ongoing

## 2021-06-03 NOTE — PROGRESS NOTES
Occupational Therapy  Facility/Department: [facility] MED SURG  Daily Treatment Note  NAME: Minerva Fitzgerald  : 1945  MRN: [id number]    Date of Service: 6/3/2021    Discharge Recommendations:  Patient would benefit from continued therapy after discharge   Pt currently functioning below baseline. Would suggest additional therapy at time of discharge to maximize long term outcomes and prevent re-admission. Please refer to AM-PAC score for current level of function. Assessment   Performance deficits / Impairments: Decreased functional mobility ; Decreased safe awareness;Decreased ADL status; Decreased strength;Decreased ROM; Decreased endurance;Decreased sensation;Decreased fine motor control;Decreased high-level IADLs;Decreased posture;Decreased cognition;Decreased balance  Assessment: Skilled OT services are indicated to increase I and safety during functional tasks to return home at prior level of function as able. Prognosis: Fair;Good  REQUIRES OT FOLLOW UP: Yes  Activity Tolerance  Activity Tolerance: Patient limited by pain;Treatment limited secondary to medical complications (resp status)  Activity Tolerance: Poor +  Safety Devices  Safety Devices in place: Yes  Type of devices: Left in chair;Call light within reach;Nurse notified; Chair alarm in place;Gait belt;Patient at risk for falls         Patient Diagnosis(es): The primary encounter diagnosis was Gastrointestinal hemorrhage, unspecified gastrointestinal hemorrhage type. A diagnosis of Pancolitis ([facility] [address] was also pertinent to this visit. has a past medical history of ASHLEIGH (acute kidney injury) ([address] Arthritis, Asthma, Bipolar 1 disorder ([address] CHF (congestive heart failure) ([address] COPD (chronic obstructive pulmonary disease) ([address] Depression, Diabetes mellitus ([address] Fibromyalgia, GERD (gastroesophageal reflux disease), Hyperlipidemia, Hypertension, and Pneumonia. has a past surgical history that includes Cholecystectomy;  Hysterectomy; Exceptions  Arousal/Alertness: Appropriate responses to stimuli  Following Commands: Follows multistep commands consistently  Attention Span: Appears intact  Memory: Appears intact  Safety Judgement: Decreased awareness of need for safety  Problem Solving: Assistance required to identify errors made;Assistance required to correct errors made  Insights: Decreased awareness of deficits  Initiation: Requires cues for some  Sequencing: Requires cues for some     Perception  Overall Perceptual Status: WFL              Type of ROM/Therapeutic Exercise  Type of ROM/Therapeutic Exercise: AROM  Comment: Pt completed simple B UE ROM exercises seated in chair. Pt has limited shoulder AROM and is very limited due to poor resp status. Freq rest breaks req'd. Exercises  Shoulder ABduction: x20  Shoulder ADduction: x20  Supination: x20  Pronation: x20  Wrist Flexion: x20  Wrist Extension: x20  Finger Flexion: x20  Finger Extension: x20  Other: x20 chest presses                    Plan   Plan  Times per week: 4-5x/wk 1x/day as rosa  Current Treatment Recommendations: Strengthening, Endurance Training, Balance Training, Functional Mobility Training, Safety Education & Training, Pain Management, Home Management Training, Self-Care / ADL, Equipment Evaluation, Education, & procurement, Patient/Caregiver Education & Training                        AM-PAC Score        AM-[facility] Inpatient Daily Activity Raw Score: 14 (06/03/21 1124)  -PAC Inpatient ADL T-Scale Score : 33.39 (06/03/21 1124)  ADL Inpatient CMS 0-100% Score: 59.67 (06/03/21 1124)  ADL Inpatient CMS G-Code Modifier : CK (06/03/21 1124)    Goals  Short term goals  Time Frame for Short term goals: By discharge, pt to demo  Short term goal 1: bed mobility to Mod I with use of bedrails as needed. Short term goal 2: toileting to SBA with use of grab bars/AD as needed. Short term goal 3: UB ADLs to Set up and LB ADLs to Min A with use of AD/AE as needed.   Short term goal 4: increased B UE strength by 1/2 grade/increased B shld AROM by 10-15 degrees to assist with self care. Short term goal 5: ADL transfers and functional mobility to Mod I with use of AD as needed. Long term goals  Long term goal 1: Pt to demo increase standing rosa > 8 min with Min A using AD as needed to reduce fall risk during functional tasks. Long term goal 2: Pt to be I with fall prevention education, EC/WS tech, and recommendations for AE with use of handouts as needed. Patient Goals   Patient goals : To go home!        Therapy Time   Individual Concurrent Group Co-treatment   Time In 1107         Time Out 1132         Minutes [address] [place] [address], [facility]

## 2021-06-03 NOTE — PROGRESS NOTES
GI Progress notes    6/3/2021   5:38 PM    Name:  Salina Barger  MRN:    [id number]     [place]:     [de-identified]   Room:  2006/2006-02  IP Day: 9     Admit Date: 5/25/2021 12:33 PM  PCP: Joe Pa DO    Subjective:     C/C:   Chief Complaint   Patient presents with    GI Bleeding       Interval History: Status: improved. Patient slowly improving now  Minimal if any bleeding  Tolerating clears  Wants to eat regular food  No nausea vomiting  Hemoglobin low but stable  Has some abdominal bloating and gas    ROS:  Constitutional: negative for chills, fevers and sweats    Gastrointestinal: Mild abdominal pain, constipation, diarrhea, nausea and vomiting      Medications: Allergies:    Allergies   Allergen Reactions    Aspirin     Ibuprofen     Sulfa Antibiotics     Tape Arvie Susan Tape]     Lyrica [Pregabalin] Other (See Comments)     \"made her goofy\"       Current Meds: [START ON 6/4/2021] levoFLOXacin (LEVAQUIN) tablet 250 mg, Daily  HYDROcodone-acetaminophen (NORCO) 5-325 MG per tablet 1 tablet, Q6H PRN  diclofenac sodium (VOLTAREN) 1 % gel 2 g, BID  0.9 % sodium chloride infusion, PRN  guaiFENesin-dextromethorphan (ROBITUSSIN DM) 100-10 MG/5ML syrup 10 mL, Q4H PRN  0.9 % sodium chloride infusion, PRN  0.9 % sodium chloride infusion, PRN  albuterol (PROVENTIL) nebulizer solution 2.5 mg, Q6H PRN  0.9 % sodium chloride infusion, PRN  0.9 % sodium chloride infusion, PRN  insulin detemir (LEVEMIR) injection pen 10 Units, BID  methylPREDNISolone sodium (SOLU-MEDROL) injection 40 mg, Q8H  albuterol (PROVENTIL) nebulizer solution 2.5 mg, 4x daily  sodium chloride flush 0.9 % injection 10 mL, 2 times per day  sodium chloride flush 0.9 % injection 10 mL, PRN  furosemide (LASIX) tablet 40 mg, BID  montelukast (SINGULAIR) tablet 10 mg, Nightly  insulin lispro (HUMALOG) injection vial 0-12 Units, TID WC  insulin lispro (HUMALOG) injection vial 0-6 Units, Nightly  glucose (GLUTOSE) 40 % oral gel 15 g, PRN  dextrose 50 % IV solution, PRN  glucagon (rDNA) injection 1 mg, PRN  dextrose 5 % solution, PRN  sodium chloride flush 0.9 % injection 5-40 mL, Q12H  atorvastatin (LIPITOR) tablet 40 mg, Nightly  budesonide-formoterol (SYMBICORT) 160-4.5 MCG/ACT inhaler 1 puff, BID  Vitamin D (CHOLECALCIFEROL) tablet 2,000 Units, Dinner  ferrous sulfate (FE TABS 325) EC tablet 325 mg, Dinner  fluticasone (FLONASE) 50 MCG/ACT nasal spray 1 spray, Daily  hydrOXYzine (ATARAX) tablet 25 mg, BID  loperamide (IMODIUM) capsule 2 mg, PRN  magnesium oxide (MAG-OX) tablet 400 mg, BID  mirtazapine (REMERON) tablet 15 mg, Nightly  pantoprazole (PROTONIX) tablet 40 mg, QAM AC  tiotropium (SPIRIVA RESPIMAT) 2.5 MCG/ACT inhaler 2 puff, Daily  tiZANidine (ZANAFLEX) tablet 2 mg, BID  sodium chloride flush 0.9 % injection 5-40 mL, PRN  0.9 % sodium chloride infusion, PRN  promethazine (PHENERGAN) tablet 12.5 mg, Q6H PRN   Or  ondansetron (ZOFRAN) injection 4 mg, Q6H PRN  acetaminophen (TYLENOL) tablet 650 mg, Q6H PRN   Or  acetaminophen (TYLENOL) suppository 650 mg, Q6H PRN  albuterol sulfate  (90 Base) MCG/ACT inhaler 2 puff, Q6H PRN  buPROPion (WELLBUTRIN XL) extended release tablet 150 mg, QAM  DULoxetine (CYMBALTA) extended release capsule 90 mg, Daily  LORazepam (ATIVAN) tablet 0.25 mg, BID PRN  nortriptyline (PAMELOR) capsule 10 mg, Nightly  cetirizine (ZYRTEC) tablet 10 mg, Daily  [Held by provider] alogliptin (NESINA) tablet 12.5 mg, Daily        Data:     Code Status:  Full Code    Family History   Problem Relation Age of Onset    Heart Failure Mother     Cancer Sister     Cancer Maternal Grandmother     Heart Failure Maternal Grandmother        Social History     Socioeconomic History    Marital status:       Spouse name: Not on file    Number of children: Not on file    Years of education: Not on file    Highest education level: Not on file   Occupational History    Not on file   Tobacco Use    Smoking status: Heavy Tobacco Smoker     Packs/day: 1.00     Years: 40.00     Pack years: 40.00    Smokeless tobacco: Never Used   Substance and Sexual Activity    Alcohol use: No    Drug use: No    Sexual activity: Not on file   Other Topics Concern    Not on file   Social History Narrative    Not on file     Social Determinants of Health     Financial Resource Strain:     Difficulty of Paying Living Expenses:    Food Insecurity:     Worried About Running Out of Food in the Last Year:     Ran Out of Food in the Last Year:    Transportation Needs:     Lack of Transportation (Medical):  Lack of Transportation (Non-Medical):    Physical Activity:     Days of Exercise per Week:     Minutes of Exercise per Session:    Stress:     Feeling of Stress :    Social Connections:     Frequency of Communication with Friends and Family:     Frequency of Social Gatherings with Friends and Family:     Attends [employer] Services:     Active Member of Clubs or Organizations:     Attends Club or Organization Meetings:     Marital Status:    Intimate Partner Violence:     Fear of Current or Ex-Partner:     Emotionally Abused:     Physically Abused:     Sexually Abused:        Vitals:  BP (!) 141/41   Pulse 88   Temp 98.4 °F (36.9 °C) (Oral)   Resp 18   Ht 4' 7\" (1.397 m)   Wt 210 lb (95.3 kg)   SpO2 100%   BMI 48.81 kg/m²   Temp (24hrs), Av.1 °F (36.7 °C), Min:97.5 °F (36.4 °C), Max:98.4 °F (36.9 °C)    Recent Labs     21  2008 21  0607 21  1124 21  1629   POCGLU 223* 116* 320* 137*       I/O (24Hr):     Intake/Output Summary (Last 24 hours) at 6/3/2021 1738  Last data filed at 6/3/2021 1727  Gross per 24 hour   Intake 400 ml   Output 1250 ml   Net -850 ml       Labs:      CBC:   Lab Results   Component Value Date    WBC 20.9 2021    RBC 2.45 2021    HGB 7.0 2021    HCT 23.2 2021    MCV 91.0 2021    MCH 29.0 2021    MCHC 31.8 2021    RDW 16.8 06/01/2021     06/01/2021    MPV 10.4 06/01/2021     CBC with Differential:    Lab Results   Component Value Date    WBC 20.9 06/01/2021    RBC 2.45 06/01/2021    HGB 7.0 06/03/2021    HCT 23.2 06/03/2021     06/01/2021    MCV 91.0 06/01/2021    MCH 29.0 06/01/2021    MCHC 31.8 06/01/2021    RDW 16.8 06/01/2021    NRBC 1 04/21/2016    LYMPHOPCT 5 06/01/2021    MONOPCT 5 06/01/2021    MYELOPCT 2 04/21/2016    BASOPCT 0 06/01/2021    MONOSABS 0.98 06/01/2021    LYMPHSABS 1.06 06/01/2021    EOSABS <0.03 06/01/2021    BASOSABS 0.03 06/01/2021    DIFFTYPE NOT REPORTED 06/01/2021     Hemoglobin/Hematocrit:    Lab Results   Component Value Date    HGB 7.0 06/03/2021    HCT 23.2 06/03/2021     CMP:    Lab Results   Component Value Date     06/03/2021    K 3.7 06/03/2021     06/03/2021    CO2 25 06/03/2021    BUN 25 06/03/2021    CREATININE 1.27 06/03/2021    GFRAA 50 06/03/2021    LABGLOM 41 06/03/2021    GLUCOSE 115 06/03/2021    PROT 4.2 06/01/2021    LABALBU 2.6 06/01/2021    CALCIUM 6.5 06/03/2021    BILITOT 0.34 06/01/2021    ALKPHOS 72 06/01/2021    AST 15 06/01/2021    ALT 12 06/01/2021     BMP:    Lab Results   Component Value Date     06/03/2021    K 3.7 06/03/2021     06/03/2021    CO2 25 06/03/2021    BUN 25 06/03/2021    LABALBU 2.6 06/01/2021    CREATININE 1.27 06/03/2021    CALCIUM 6.5 06/03/2021    GFRAA 50 06/03/2021    LABGLOM 41 06/03/2021    GLUCOSE 115 06/03/2021     PT/INR:    Lab Results   Component Value Date    PROTIME 15.8 05/31/2021    INR 1.3 05/31/2021     PTT:    Lab Results   Component Value Date    APTT 22.3 05/31/2021   [APTT}    Physical Examination:        General appearance: alert, obesity cooperative and no distress  Mental Status: oriented to person, place and time and anxious affect    Abdomen: soft, nontender, nondistended, bowel sounds present bloated belly    Assessment:        Primary Problem  GI (gastrointestinal bleed)     Active Hospital Problems    Diagnosis Date Noted    Morbid obesity with BMI of 40.0-44.9, adult ([facility] [E66.01, Z68.41] 06/01/2021    Pancolitis ([address] [K51.00]     GI (gastrointestinal bleed) [K92.2] 05/25/2021    Chronic diastolic congestive heart failure ([address] [I50.32]     Anemia due to blood loss, acute [D62] 06/23/2017    Acute colitis [K52.9] 06/22/2017    BRBPR (bright red blood per rectum) [K62.5] 06/22/2017    Bipolar 1 disorder ([address] [F31.9] 02/13/2017    Type 2 diabetes mellitus without complication, without long-term current use of insulin ([facility] [address] [E11.9] 01/01/2017    ASHLEIGH (acute kidney injury) ([facility] [address] [N17.9] 04/16/2016    Chronic obstructive pulmonary disease with acute exacerbation ([facility] [address] [facility] [J44.1] 04/16/2016     Past Medical History:   Diagnosis Date    ASHLEIGH (acute kidney injury) ([address] 4/16/2016    Arthritis     Asthma     Bipolar 1 disorder ([facility] [address] [facility]     CHF (congestive heart failure) (HCC)     COPD (chronic obstructive pulmonary disease) (HCC)     3 L home O2    Depression     Diabetes mellitus (HCC)     Fibromyalgia     GERD (gastroesophageal reflux disease)     Hyperlipidemia     Hypertension     Pneumonia         Plan:        1. Continue current care  2. Full liquid diet as tolerated  3. If continues to improve we will change steroids to oral tomorrow and soft diet as tolerated  4. We will require outpatient interval colonoscopy to evaluate for evidence of colitis noted during last colonoscopy by my partner  5. Follow-up hemoglobin hematocrit  6.  Continue PPI therapy    Explained to the patient and d/W Nursing Staff  Will F/U with you  Please call or Page for any issues or change in status  Thanks    Electronically signed by Eduardo Freeman MD on 6/3/2021 at 5:38 PM

## 2021-06-03 NOTE — PROGRESS NOTES
Physical Therapy  Facility/Department: [facility]  Daily Treatment Note  NAME: Ivy Doyle  : 1945  MRN: [id number]    Date of Service: 6/3/2021    Discharge Recommendations:  Patient would benefit from continued therapy after discharge        Assessment   Body structures, Functions, Activity limitations: Decreased functional mobility ; Decreased safe awareness;Decreased balance;Decreased endurance;Decreased strength  Assessment: Patient demo decreased safety with gait and transfers with limited aerobic capacity. Activity Tolerance  Activity Tolerance: Patient limited by fatigue;Patient limited by endurance     Patient Diagnosis(es): The primary encounter diagnosis was Gastrointestinal hemorrhage, unspecified gastrointestinal hemorrhage type. A diagnosis of Pancolitis ([facility] was also pertinent to this visit. has a past medical history of ASHLEIGH (acute kidney injury) ([facility] [facility] Arthritis, Asthma, Bipolar 1 disorder ([facility] [facility] CHF (congestive heart failure) ([facility] [facility] COPD (chronic obstructive pulmonary disease) ([facility] [facility] Depression, Diabetes mellitus ([facility] Fibromyalgia, GERD (gastroesophageal reflux disease), Hyperlipidemia, Hypertension, and Pneumonia. has a past surgical history that includes Cholecystectomy; Hysterectomy; Tonsillectomy; Knee Arthroplasty (Left); Foot fracture surgery (Left); pr egd transoral biopsy single/multiple (N/A, 2017); pr colsc flx w/rmvl of tumor polyp lesion snare tq (2017); Colonoscopy (2017); Upper gastrointestinal endoscopy (N/A, 2021); Colonoscopy (N/A, 2021); IR INSERT PICC VAD W SQ PORT >5 YEARS (2021); Upper gastrointestinal endoscopy (N/A, 2021); and sigmoidoscopy (N/A, 2021).     Restrictions  Restrictions/Precautions  Restrictions/Precautions: General Precautions, Up as Tolerated, Fall Risk  Required Braces or Orthoses?: No  Position Activity Restriction  Other position/activity restrictions: clear liquid diet, telemetry, LUE IV, 02  Subjective   General  Chart Reviewed: Yes  Additional Pertinent Hx: OA, Bipolar, CHF, COPD, DM , Fibromyalgia  Subjective  Subjective: Patient reported that she was easily faitgued and initially did not want to particiapte with PT treatment but agreeable with encouragement. General Comment  Comments: pt up in chair upon arrival agreeable to chairside ex   Pain Screening  Patient Currently in Pain: Denies  Pain Assessment  Pain Assessment: 0-10  Vital Signs  Patient Currently in Pain: Denies  Pre Treatment Pain Screening  Intervention List: Patient able to continue with treatment    Orientation     Cognition      Objective      Transfers  Comment: refused        Balance  Posture: Good  Sitting - Static: Good  Sitting - Dynamic: Good  Standing - Static: Fair  Standing - Dynamic: Fair  Exercises  Comments: seated LE AROM x 15 reps, UE medium reistance tband ex x 15 reps                        G-Code     OutComes Score                                                     AM-PAC Score  AM-PAC Inpatient Mobility Raw Score : 15 (06/03/21 1612)  AM-PAC Inpatient T-Scale Score : 39.45 (06/03/21 1612)  Mobility Inpatient CMS 0-100% Score: 57.7 (06/03/21 1612)  Mobility Inpatient CMS G-Code Modifier : CK (06/03/21 1612)          Goals  Short term goals  Time Frame for Short term goals: 12 visits  Short term goal 1: Patient will be SBA for transfers. Short term goal 2: Patient will amb 50 feet with RW and SBA. Short term goal 3: Patient will have fair+ standing balance. Short term goal 4: Patient will tolerate 30 minutes for ther-ex and ther-act. Patient Goals   Patient goals : none stated    Plan    Plan  Times per week: 1-2x/d, 5-6 d/wk  Current Treatment Recommendations: Strengthening, Balance Training, Functional Mobility Training, Transfer Training, Gait Training, Endurance Training, Safety Education & Training, Patient/Caregiver Education & Training, Home Exercise Program  Safety Devices  Type of devices:  All fall risk precautions in place, Gait belt, Nurse notified, Call light within reach, Chair alarm in place, Left in chair  Restraints  Initially in place: No     Therapy Time   Individual Concurrent Group Co-treatment   Time In  1238         Time Out  1301         Minutes  23                 [address], [place]

## 2021-06-03 NOTE — PLAN OF CARE
Problem: Falls - Risk of:  Goal: Will remain free from falls  Description: Will remain free from falls  6/2/2021 2318 by Julia Vallejo RN  Outcome: Ongoing  Note: Patient will be free from falls this shift and is aware of personal limits. 6/2/2021 1209 by Fartun Ricci RN  Outcome: Ongoing  Note: Bed in lowest position, call light within reach, hourly rounding fallen star, bed/chair alarm  6/2/2021 1208 by Fartun Ricci RN  Outcome: Ongoing  Goal: Absence of physical injury  Description: Absence of physical injury  Outcome: Ongoing  Note: Patient is free from injury this shift. Problem: Skin Integrity:  Goal: Skin integrity will improve  Description: Skin integrity will improve  Outcome: Ongoing  Note: Patient has no new skin issues this shift. Problem: Pain:  Goal: Patient's pain/discomfort is manageable  Description: Patient's pain/discomfort is manageable  Outcome: Ongoing  Note: Patient has as needed pain control and will ask for it. Patient has chronic pain and is satisfied with current treatment.

## 2021-06-03 NOTE — CARE COORDINATION
[facility] [facility] [place] [facility] received precert and can admit at [employer].  Gisela Bal

## 2021-06-03 NOTE — PROGRESS NOTES
[facility]  Office: [address], DO, Sabhia Joaquin, DO, Garret Garciaport, DO, Marlo Kanner Blood, DO, Evie Patel MD, Annemarie Issa MD, Kanu Hendricks MD, Dinah Mckoy MD, Lesli Dunham MD, Saumya Smith MD, Yue Trejo MD, Ron Ford MD, Chandrika Bills DO, Madhav Kerns MD, Diana Barrett, DO, Matias Ortiz MD,  Cari Cardozo DO, Xiomy Rodriguez MD, Jaycob Moulton MD, Mary Moses MD, Lauren Garcia MD, Lan Duckworth CNP, [facility], CNP, Pito Qureshi, CNP, Shruti Vidales, CNS, Domenica Gonzalez, CNP, Tracy Mcmillan, CNP, Veronica Luz, CNP, Alesia Mulligan, CNP, Michael Ozuna, CNP, Darinel Dela Cruz PA-C, Nabor Rivero, [facility], Helen Ambrosio, CNP, Aaron Navarro, CNP, Jayshree Schilling, CNP, Sandra Pascal, CNP, Amada Lindsay, CNP, Supa Ward, [address]    Progress Note    6/3/2021    11:29 AM    Name:   Jamila Crockett  MRN:     [id number]     [place]:      [de-identified]   Room:   2006/2006-02   Day:  9  Admit Date:  5/25/2021 12:33 PM    PCP:   Alcides Ramirez DO  Code Status:  Full Code    Subjective:     C/C:   Chief Complaint   Patient presents with    GI Bleeding     Interval History Status: worsened. Feels ok today but did have some bright red blood per rectum this am-but this is slowing down each day    Also has some sob this am    6/1 egd showed gastritis and colonoscopy showed black, pasty, tarry stool    Brief History:     Per the NP:  \"5/25 -patient has a history of A. fib on Eliquis.  Over the past 3 days patient has noted bright red blood per rectum.  Patient reports that during that same timeLeonor Ulloa has began experiencing worsening fatigue and shortness of breath.  Patient is now to the point where she has severe shortness of breath with minimal exertion and severe dizziness and weakness while rising.  Patient continues to experience dark red stools mixed with bright red blood and liquid diarrhea.  CT of the abdomen shows pancolitis. GI consulted.      5/26 -patient received blood transfusions. Reportedly going for EGD and colonoscopy today. Stool culture is pending.     5/27 - Little change. Steroids started per GI following scopes. Inflammatory bowel disease with diffuse colitis noted in GI note. Still passing blood and has now required 5 units of blood.  GI completed EGD and colonoscopy yesterday and showed diffuse colitis. Patient continues to endorse abdominal discomfort. Steroids started per GI following scopes. Inflammatory bowel disease with diffuse colitis noted in GI note. Still passing blood and has now required 5 units of blood. \"     5/28-5/31- Patient has had continued bloody stools with clots and needed a total of 15 units PRBCs transfused. Review of Systems:     Constitutional:  negative for chills, fevers, sweats  Respiratory:  positive for dyspnea on exertion, shortness of breath  Cardiovascular:  negative for chest pain, chest pressure/discomfort, lower extremity edema, palpitations  Gastrointestinal:  negative for abdominal pain, constipation, diarrhea, nausea, vomiting  Neurological:  negative for dizziness, headache    Medications: Allergies:     Allergies   Allergen Reactions    Aspirin     Ibuprofen     Sulfa Antibiotics     Tape Khloe Lose Tape]     Lyrica [Pregabalin] Other (See Comments)     \"made her goofy\"       Current Meds:   Scheduled Meds:    insulin detemir  10 Units Subcutaneous BID    methylPREDNISolone  40 mg Intravenous Q8H    albuterol  2.5 mg Nebulization 4x daily    sodium chloride flush  10 mL Intravenous 2 times per day    furosemide  40 mg Oral BID    montelukast  10 mg Oral Nightly    insulin lispro  0-12 Units Subcutaneous TID WC    insulin lispro  0-6 Units Subcutaneous Nightly    sodium chloride flush  5-40 mL Intravenous Q12H    atorvastatin  40 mg Oral Nightly    budesonide-formoterol  1 puff Inhalation BID    Vitamin D  2,000 Units Oral Dinner    ferrous sulfate  325 mg Oral Dinner    fluticasone  1 spray Nasal Daily    hydrOXYzine  25 mg Oral BID    magnesium oxide  400 mg Oral BID    mirtazapine  15 mg Oral Nightly    pantoprazole  40 mg Oral QAM AC    tiotropium  2 puff Inhalation Daily    tiZANidine  2 mg Oral BID    buPROPion  150 mg Oral QAM    DULoxetine  90 mg Oral Daily    nortriptyline  10 mg Oral Nightly    cetirizine  10 mg Oral Daily    [Held by provider] alogliptin  12.5 mg Oral Daily     Continuous Infusions:    sodium chloride      sodium chloride      sodium chloride      sodium chloride      sodium chloride 125 mL/hr at 06/01/21 2521    dextrose      sodium chloride       PRN Meds: sodium chloride, guaiFENesin-dextromethorphan, sodium chloride, sodium chloride, albuterol, sodium chloride, sodium chloride, sodium chloride flush, glucose, dextrose, glucagon (rDNA), dextrose, HYDROmorphone, loperamide, sodium chloride flush, sodium chloride, promethazine **OR** ondansetron, acetaminophen **OR** acetaminophen, albuterol sulfate HFA, LORazepam    Data:     Past Medical History:   has a past medical history of ASHLEIGH (acute kidney injury) ([facility] [address] Arthritis, Asthma, B[facility] 1 disorder ([facility] CHF (congestive heart failure) ([facility] COPD (chronic obstructive pulmonary disease) ([facility] Depression, Diabetes mellitus ([facility] Fibromyalgia, GERD (gastroesophageal reflux disease), Hyperlipidemia, Hypertension, and Pneumonia. Social History:   reports that she has been smoking. She has a 40.00 pack-year smoking history. She has never used smokeless tobacco. She reports that she does not drink alcohol and does not use drugs.      Family History:   Family History   Problem Relation Age of Onset    Heart Failure Mother     Cancer Sister     Cancer Maternal Grandmother     Heart Failure Maternal Grandmother        Vitals:  /80   Pulse 82   Temp 97.5 °F (36.4 °C) (Oral)   Resp 18   Ht 4' 7\" (1.397 m)   Wt 210 lb (95.3 kg)   SpO2 98%   BMI 48.81 kg/m²   Temp (24hrs), Av.1 °F (36.7 °C), Min:97.5 °F (36.4 °C), Max:98.4 °F (36.9 °C)    Recent Labs     21  1103 21  16121  0607   POCGLU 277* 186* 223* 116*       I/O (24Hr):     Intake/Output Summary (Last 24 hours) at 6/3/2021 1129  Last data filed at 6/3/2021 1030  Gross per 24 hour   Intake 400 ml   Output 800 ml   Net -400 ml       Labs:  Hematology:  Recent Labs     21  19321  0921  01321  0300 21  0900   WBC  --  20.9*  --   --   --    RBC  --  2.45*  --   --   --    HGB 7.8* 7.1* 5.3* 7.8* 7.2*   HCT 23.9* 22.3* 17.2* 25.0* 23.4*   MCV  --  91.0  --   --   --    MCH  --  29.0  --   --   --    MCHC  --  31.8  --   --   --    RDW  --  16.8*  --   --   --    PLT  --  244  --   --   --    MPV  --  10.4  --   --   --    INR 1.3  --   --   --   --      Chemistry:  Recent Labs     21  0921  0520    142   K 5.4* 3.7    108*   CO2 22 25   GLUCOSE 206* 115*   BUN 37* 25*   CREATININE 1.98* 1.27*   ANIONGAP 11 9   LABGLOM 25* 41*   GFRAA 30* 50*   CALCIUM 6.7* 6.5*     Recent Labs     21  0921  0621  1103 06/02/21  16121  0607   PROT 4.2*  --   --   --   --   --   --    LABALBU 2.6*  --   --   --   --   --   --    AST 15  --   --   --   --   --   --    ALT 12  --   --   --   --   --   --    ALKPHOS 72  --   --   --   --   --   --    BILITOT 0.34  --   --   --   --   --   --    POCGLU  --  193* 172* 277* 186* 223* 116*     ABG:  Lab Results   Component Value Date    POCPH 7.26 2016    POCPCO2 56 2016    POCPO2 80 2016    POCHCO3 25.3 2016    NBEA 2 2016    PBEA NOT REPORTED 2016    UJG5GKC 27 2016    XMPF4BTX 93 2016    FIO2 NOT REPORTED 2016         Radiology:  CT ABDOMEN PELVIS W IV CONTRAST Additional Contrast? None    Result Date: 2021  Acute nonspecific pancolitis. Infectious or inflammatory colitis can be considered, less likely ischemic since the major visceral arteries appear patent. XR CHEST PORTABLE    Result Date: 5/26/2021  Subtle bibasilar infiltrates representing atelectasis versus pneumonia. XR CHEST 1 VIEW    Result Date: 5/29/2021  Minimal left basilar atelectasis. Otherwise, clear lungs. Cardiomegaly. IR INSERT PICC VAD W SQ PORT >5 YEARS    Result Date: 5/28/2021  Successful ultrasound and fluoroscopy guided right basilic vein 5 [place] power injectable dual-lumen PICC placement. Ready for use.        Physical Examination:        General appearance:  alert, cooperative and no distress  Mental Status:  oriented to person, place and time and normal affect  Lungs:  wheezes bilaterally, normal effort  Heart:  regular rate and rhythm, no murmur  Abdomen:  soft, nontender, nondistended, normal bowel sounds, no masses, hepatomegaly, splenomegaly; obese  Extremities:  no edema, redness, tenderness in the calves  Skin:  no gross lesions, rashes, induration    Assessment:        Hospital Problems         Last Modified POA    * (Principal) GI (gastrointestinal bleed) 5/25/2021 Yes    Chronic obstructive pulmonary disease with acute exacerbation ([address] 5/25/2021 Yes    ASHLEIGH (acute kidney injury) ([address] 5/25/2021 Yes    Type 2 diabetes mellitus without complication, without long-term current use of insulin ([address] 5/25/2021 Yes    Bipolar 1 disorder ([address] 5/25/2021 Yes    BRBPR (bright red blood per rectum) 5/25/2021 Yes    Acute colitis 5/25/2021 Yes    Anemia due to blood loss, acute 5/25/2021 Yes    Chronic diastolic congestive heart failure ([address] 6/1/2021 Yes    Pancolitis ([address] 5/26/2021 Yes    Morbid obesity with BMI of 40.0-44.9, adult ([address] 6/1/2021 Yes      acute pulm edema from multiple transfusions; has dhf    Plan:        Keep on clears; advance to fulls if ok with gi  Monitor h/h closely; transfuse prn, has had 15u prbc thus far and 2u ffp  No chemical dvt prophylaxis due to ongoing bleeding  On steroids per GI    Telly SNOW Blood, DO  6/3/2021  11:29 AM

## 2021-06-04 ENCOUNTER — APPOINTMENT (OUTPATIENT)
Dept: GENERAL RADIOLOGY | Age: 76
DRG: 385 | End: 2021-06-04
Payer: COMMERCIAL

## 2021-06-04 LAB
GLUCOSE BLD-MCNC: 204 MG/DL (ref 65–105)
GLUCOSE BLD-MCNC: 251 MG/DL (ref 65–105)
GLUCOSE BLD-MCNC: 278 MG/DL (ref 65–105)
GLUCOSE BLD-MCNC: 82 MG/DL (ref 65–105)
HCT VFR BLD CALC: 24.4 % (ref 36.3–47.1)
HCT VFR BLD CALC: 25 % (ref 36.3–47.1)
HCT VFR BLD CALC: 26.8 % (ref 36.3–47.1)
HEMOGLOBIN: 7.9 G/DL (ref 11.9–15.1)
HEMOGLOBIN: 8.2 G/DL (ref 11.9–15.1)
HEMOGLOBIN: 8.7 G/DL (ref 11.9–15.1)

## 2021-06-04 PROCEDURE — 2580000003 HC RX 258: Performed by: INTERNAL MEDICINE

## 2021-06-04 PROCEDURE — 6360000002 HC RX W HCPCS: Performed by: NURSE PRACTITIONER

## 2021-06-04 PROCEDURE — 6360000002 HC RX W HCPCS: Performed by: INTERNAL MEDICINE

## 2021-06-04 PROCEDURE — 6370000000 HC RX 637 (ALT 250 FOR IP): Performed by: NURSE PRACTITIONER

## 2021-06-04 PROCEDURE — 85014 HEMATOCRIT: CPT

## 2021-06-04 PROCEDURE — 85018 HEMOGLOBIN: CPT

## 2021-06-04 PROCEDURE — 86900 BLOOD TYPING SEROLOGIC ABO: CPT

## 2021-06-04 PROCEDURE — 1200000000 HC SEMI PRIVATE

## 2021-06-04 PROCEDURE — 2700000000 HC OXYGEN THERAPY PER DAY

## 2021-06-04 PROCEDURE — 94640 AIRWAY INHALATION TREATMENT: CPT

## 2021-06-04 PROCEDURE — 6370000000 HC RX 637 (ALT 250 FOR IP): Performed by: INTERNAL MEDICINE

## 2021-06-04 PROCEDURE — 71045 X-RAY EXAM CHEST 1 VIEW: CPT

## 2021-06-04 PROCEDURE — 97530 THERAPEUTIC ACTIVITIES: CPT

## 2021-06-04 PROCEDURE — P9016 RBC LEUKOCYTES REDUCED: HCPCS

## 2021-06-04 PROCEDURE — 99232 SBSQ HOSP IP/OBS MODERATE 35: CPT | Performed by: INTERNAL MEDICINE

## 2021-06-04 PROCEDURE — 2500000003 HC RX 250 WO HCPCS: Performed by: NURSE PRACTITIONER

## 2021-06-04 PROCEDURE — 82947 ASSAY GLUCOSE BLOOD QUANT: CPT

## 2021-06-04 PROCEDURE — 97116 GAIT TRAINING THERAPY: CPT

## 2021-06-04 RX ORDER — METOPROLOL TARTRATE 5 MG/5ML
2.5 INJECTION INTRAVENOUS ONCE
Status: COMPLETED | OUTPATIENT
Start: 2021-06-04 | End: 2021-06-04

## 2021-06-04 RX ORDER — LOPERAMIDE HYDROCHLORIDE 2 MG/1
2 CAPSULE ORAL EVERY 6 HOURS PRN
Status: DISCONTINUED | OUTPATIENT
Start: 2021-06-04 | End: 2021-06-05 | Stop reason: HOSPADM

## 2021-06-04 RX ORDER — PROMETHAZINE HYDROCHLORIDE 25 MG/ML
12.5 INJECTION, SOLUTION INTRAMUSCULAR; INTRAVENOUS ONCE
Status: COMPLETED | OUTPATIENT
Start: 2021-06-04 | End: 2021-06-04

## 2021-06-04 RX ORDER — OXYCODONE HYDROCHLORIDE AND ACETAMINOPHEN 5; 325 MG/1; MG/1
2 TABLET ORAL EVERY 6 HOURS PRN
Status: DISCONTINUED | OUTPATIENT
Start: 2021-06-04 | End: 2021-06-05 | Stop reason: HOSPADM

## 2021-06-04 RX ORDER — DIPHENHYDRAMINE HYDROCHLORIDE 50 MG/ML
25 INJECTION INTRAMUSCULAR; INTRAVENOUS ONCE
Status: COMPLETED | OUTPATIENT
Start: 2021-06-04 | End: 2021-06-04

## 2021-06-04 RX ORDER — OXYCODONE HYDROCHLORIDE AND ACETAMINOPHEN 5; 325 MG/1; MG/1
1 TABLET ORAL EVERY 6 HOURS PRN
Status: DISCONTINUED | OUTPATIENT
Start: 2021-06-04 | End: 2021-06-05 | Stop reason: HOSPADM

## 2021-06-04 RX ORDER — DIPHENHYDRAMINE HYDROCHLORIDE 50 MG/ML
50 INJECTION INTRAMUSCULAR; INTRAVENOUS ONCE
Status: COMPLETED | OUTPATIENT
Start: 2021-06-04 | End: 2021-06-04

## 2021-06-04 RX ORDER — LORAZEPAM 2 MG/ML
0.5 INJECTION INTRAMUSCULAR ONCE
Status: COMPLETED | OUTPATIENT
Start: 2021-06-04 | End: 2021-06-04

## 2021-06-04 RX ADMIN — MAGNESIUM GLUCONATE 500 MG ORAL TABLET 400 MG: 500 TABLET ORAL at 21:48

## 2021-06-04 RX ADMIN — Medication 2000 UNITS: at 16:15

## 2021-06-04 RX ADMIN — FUROSEMIDE 40 MG: 40 TABLET ORAL at 16:15

## 2021-06-04 RX ADMIN — NORTRIPTYLINE HYDROCHLORIDE 10 MG: 10 CAPSULE ORAL at 21:49

## 2021-06-04 RX ADMIN — OXYCODONE AND ACETAMINOPHEN 2 TABLET: 5; 325 TABLET ORAL at 21:48

## 2021-06-04 RX ADMIN — SODIUM CHLORIDE, PRESERVATIVE FREE 10 ML: 5 INJECTION INTRAVENOUS at 22:07

## 2021-06-04 RX ADMIN — METHYLPREDNISOLONE SODIUM SUCCINATE 40 MG: 40 INJECTION, POWDER, FOR SOLUTION INTRAMUSCULAR; INTRAVENOUS at 15:44

## 2021-06-04 RX ADMIN — OXYCODONE AND ACETAMINOPHEN 2 TABLET: 5; 325 TABLET ORAL at 09:39

## 2021-06-04 RX ADMIN — FLUTICASONE PROPIONATE 1 SPRAY: 50 SPRAY, METERED NASAL at 09:41

## 2021-06-04 RX ADMIN — INSULIN LISPRO 3 UNITS: 100 INJECTION, SOLUTION INTRAVENOUS; SUBCUTANEOUS at 21:48

## 2021-06-04 RX ADMIN — DICLOFENAC SODIUM 2 G: 10 GEL TOPICAL at 20:43

## 2021-06-04 RX ADMIN — LORAZEPAM 0.25 MG: 0.5 TABLET ORAL at 16:15

## 2021-06-04 RX ADMIN — SODIUM CHLORIDE, PRESERVATIVE FREE 10 ML: 5 INJECTION INTRAVENOUS at 22:09

## 2021-06-04 RX ADMIN — PANTOPRAZOLE SODIUM 40 MG: 40 TABLET, DELAYED RELEASE ORAL at 06:46

## 2021-06-04 RX ADMIN — ALBUTEROL SULFATE 2.5 MG: 2.5 SOLUTION RESPIRATORY (INHALATION) at 18:03

## 2021-06-04 RX ADMIN — DIPHENHYDRAMINE HYDROCHLORIDE 50 MG: 50 INJECTION, SOLUTION INTRAMUSCULAR; INTRAVENOUS at 04:14

## 2021-06-04 RX ADMIN — CETIRIZINE HYDROCHLORIDE 10 MG: 10 TABLET, FILM COATED ORAL at 09:40

## 2021-06-04 RX ADMIN — HYDROXYZINE HYDROCHLORIDE 25 MG: 25 TABLET, FILM COATED ORAL at 21:49

## 2021-06-04 RX ADMIN — INSULIN LISPRO 4 UNITS: 100 INJECTION, SOLUTION INTRAVENOUS; SUBCUTANEOUS at 13:19

## 2021-06-04 RX ADMIN — BUDESONIDE AND FORMOTEROL FUMARATE DIHYDRATE 1 PUFF: 160; 4.5 AEROSOL RESPIRATORY (INHALATION) at 06:13

## 2021-06-04 RX ADMIN — ALBUTEROL SULFATE 2.5 MG: 2.5 SOLUTION RESPIRATORY (INHALATION) at 14:25

## 2021-06-04 RX ADMIN — ALBUTEROL SULFATE 2.5 MG: 2.5 SOLUTION RESPIRATORY (INHALATION) at 06:13

## 2021-06-04 RX ADMIN — MAGNESIUM GLUCONATE 500 MG ORAL TABLET 400 MG: 500 TABLET ORAL at 09:40

## 2021-06-04 RX ADMIN — BUPROPION HYDROCHLORIDE 150 MG: 150 TABLET, EXTENDED RELEASE ORAL at 09:40

## 2021-06-04 RX ADMIN — GUAIFENESIN AND DEXTROMETHORPHAN 10 ML: 100; 10 SYRUP ORAL at 13:28

## 2021-06-04 RX ADMIN — PROMETHAZINE HYDROCHLORIDE 12.5 MG: 25 INJECTION INTRAMUSCULAR; INTRAVENOUS at 02:54

## 2021-06-04 RX ADMIN — TIZANIDINE 2 MG: 2 TABLET ORAL at 09:40

## 2021-06-04 RX ADMIN — METOPROLOL TARTRATE 2.5 MG: 5 INJECTION INTRAVENOUS at 06:38

## 2021-06-04 RX ADMIN — SODIUM CHLORIDE, PRESERVATIVE FREE 10 ML: 5 INJECTION INTRAVENOUS at 09:42

## 2021-06-04 RX ADMIN — LORAZEPAM 0.5 MG: 2 INJECTION, SOLUTION INTRAMUSCULAR; INTRAVENOUS at 00:03

## 2021-06-04 RX ADMIN — DULOXETINE HYDROCHLORIDE 90 MG: 60 CAPSULE, DELAYED RELEASE ORAL at 09:40

## 2021-06-04 RX ADMIN — INSULIN LISPRO 6 UNITS: 100 INJECTION, SOLUTION INTRAVENOUS; SUBCUTANEOUS at 16:45

## 2021-06-04 RX ADMIN — MONTELUKAST 10 MG: 10 TABLET, FILM COATED ORAL at 21:49

## 2021-06-04 RX ADMIN — TIOTROPIUM BROMIDE INHALATION SPRAY 2 PUFF: 3.12 SPRAY, METERED RESPIRATORY (INHALATION) at 06:13

## 2021-06-04 RX ADMIN — HYDROCODONE BITARTRATE AND ACETAMINOPHEN 1 TABLET: 5; 325 TABLET ORAL at 06:46

## 2021-06-04 RX ADMIN — DICLOFENAC SODIUM 2 G: 10 GEL TOPICAL at 09:40

## 2021-06-04 RX ADMIN — BUDESONIDE AND FORMOTEROL FUMARATE DIHYDRATE 1 PUFF: 160; 4.5 AEROSOL RESPIRATORY (INHALATION) at 18:03

## 2021-06-04 RX ADMIN — LEVOFLOXACIN 250 MG: 500 TABLET, FILM COATED ORAL at 13:19

## 2021-06-04 RX ADMIN — DIPHENHYDRAMINE HYDROCHLORIDE 25 MG: 50 INJECTION, SOLUTION INTRAMUSCULAR; INTRAVENOUS at 22:06

## 2021-06-04 RX ADMIN — SODIUM CHLORIDE, PRESERVATIVE FREE 10 ML: 5 INJECTION INTRAVENOUS at 09:50

## 2021-06-04 RX ADMIN — HYDROXYZINE HYDROCHLORIDE 25 MG: 25 TABLET, FILM COATED ORAL at 09:40

## 2021-06-04 RX ADMIN — ATORVASTATIN CALCIUM 40 MG: 40 TABLET, FILM COATED ORAL at 21:49

## 2021-06-04 RX ADMIN — FERROUS SULFATE TAB EC 325 MG (65 MG FE EQUIVALENT) 325 MG: 325 (65 FE) TABLET DELAYED RESPONSE at 16:15

## 2021-06-04 RX ADMIN — FUROSEMIDE 40 MG: 40 TABLET ORAL at 09:40

## 2021-06-04 RX ADMIN — MIRTAZAPINE 15 MG: 15 TABLET, FILM COATED ORAL at 21:49

## 2021-06-04 RX ADMIN — ALBUTEROL SULFATE 2.5 MG: 2.5 SOLUTION RESPIRATORY (INHALATION) at 10:47

## 2021-06-04 RX ADMIN — METHYLPREDNISOLONE SODIUM SUCCINATE 40 MG: 40 INJECTION, POWDER, FOR SOLUTION INTRAMUSCULAR; INTRAVENOUS at 00:36

## 2021-06-04 RX ADMIN — OXYCODONE AND ACETAMINOPHEN 2 TABLET: 5; 325 TABLET ORAL at 15:44

## 2021-06-04 RX ADMIN — LORAZEPAM 0.5 MG: 2 INJECTION, SOLUTION INTRAMUSCULAR; INTRAVENOUS at 02:55

## 2021-06-04 RX ADMIN — TIZANIDINE 2 MG: 2 TABLET ORAL at 21:48

## 2021-06-04 RX ADMIN — METHYLPREDNISOLONE SODIUM SUCCINATE 40 MG: 40 INJECTION, POWDER, FOR SOLUTION INTRAMUSCULAR; INTRAVENOUS at 09:49

## 2021-06-04 ASSESSMENT — PAIN DESCRIPTION - PROGRESSION
CLINICAL_PROGRESSION: NOT CHANGED

## 2021-06-04 ASSESSMENT — PAIN SCALES - GENERAL
PAINLEVEL_OUTOF10: 9
PAINLEVEL_OUTOF10: 10
PAINLEVEL_OUTOF10: 9
PAINLEVEL_OUTOF10: 10
PAINLEVEL_OUTOF10: 6

## 2021-06-04 ASSESSMENT — PAIN DESCRIPTION - FREQUENCY
FREQUENCY: CONTINUOUS

## 2021-06-04 ASSESSMENT — PAIN DESCRIPTION - ORIENTATION
ORIENTATION: RIGHT;LEFT

## 2021-06-04 ASSESSMENT — PAIN DESCRIPTION - LOCATION
LOCATION: BACK;SHOULDER

## 2021-06-04 ASSESSMENT — PAIN DESCRIPTION - PAIN TYPE
TYPE: CHRONIC PAIN

## 2021-06-04 ASSESSMENT — PAIN DESCRIPTION - DESCRIPTORS
DESCRIPTORS: ACHING;CONSTANT
DESCRIPTORS: ACHING;CONSTANT
DESCRIPTORS: ACHING

## 2021-06-04 ASSESSMENT — PAIN DESCRIPTION - ONSET
ONSET: ON-GOING

## 2021-06-04 NOTE — PROGRESS NOTES
[facility]  Office: [address], DO, Chang Madison, DO, Wing Novoa, DO, Mikaylayael Lubin Blood, DO, Helen Winter MD, Jennifer Snowden MD, Brandon Agarwal MD, Brittany Keith MD, Mahesh Avila MD, Donalynn Osler, MD, Renate Ortiz MD, Shaheen Barragan MD, Day De La Paz, DO, Chan Archuleta MD, Noel Bains, DO, Venkatesh Cortes MD,  Chinmay Lua DO, Pelon Álvarez MD, Юлия Hill MD, Alejandra Farooq MD, Thee Hayes MD, [facility], [facility], [facility], CNP, Jacqueline Madrigal, CNP, Pili Garay, CNS, Mena Magallanes, CNP, Mahogany Crockett, CNP, Delfino Matson, CNP, Janes Buenrostro, CNP, Vesna Stacy, CNP, Lucie Panda PA-C, Kris Mora, [facility], [place], Yue Dailey, CNP, Lopez Mohr, CNP, Kimmy Harrison, CNP, Crystal Banks, CNP, Shekhar Luciano, [facility], [facility], [facility]    Progress Note    6/4/2021    11:56 AM    Name:   Sonam Smiley  MRN:     [id number]     [place]:      [de-identified]   Room:   2006/2006-02   Day:  10  Admit Date:  5/25/2021 12:33 PM    PCP:   Sheri Mckeon DO  Code Status:  Full Code    Subjective:     C/C:   Chief Complaint   Patient presents with    GI Bleeding     Interval History Status: not changed. Feels ok today but did have some bright red blood per rectum this am-but this is slowing down each day    Also has some sob this am    6/1 egd showed gastritis and colonoscopy showed black, pasty, tarry stool    Required another unit blood overnight    Brief History:     Per the NP:  \"5/25 -patient has a history of A. fib on Eliquis.  Over the past 3 days patient has noted bright red blood per rectum.  Patient reports that during that same timeLeonor Alvarado has began experiencing worsening fatigue and shortness of breath.  Patient is now to the point where she has severe shortness of breath with minimal exertion and severe dizziness and weakness while rising.  Patient continues to experience dark red stools mixed with bright red blood and liquid diarrhea.  CT of the abdomen shows pancolitis. GI consulted.      5/26 -patient received blood transfusions. Reportedly going for EGD and colonoscopy today. Stool culture is pending.     5/27 - Little change. Steroids started per GI following scopes. Inflammatory bowel disease with diffuse colitis noted in GI note. Still passing blood and has now required 5 units of blood.  GI completed EGD and colonoscopy yesterday and showed diffuse colitis. Patient continues to endorse abdominal discomfort. Steroids started per GI following scopes. Inflammatory bowel disease with diffuse colitis noted in GI note. Still passing blood and has now required 5 units of blood. \"     5/28-5/31- Patient has had continued bloody stools with clots and needed a total of 15 units PRBCs transfused. Review of Systems:     Constitutional:  negative for chills, fevers, sweats  Respiratory:  positive for dyspnea on exertion, shortness of breath  Cardiovascular:  negative for chest pain, chest pressure/discomfort, lower extremity edema, palpitations  Gastrointestinal:  negative for abdominal pain, constipation, diarrhea, nausea, vomiting  Neurological:  negative for dizziness, headache    Medications: Allergies:     Allergies   Allergen Reactions    Aspirin     Ibuprofen     Sulfa Antibiotics     Tape Burke Ora Tape]     Lyrica [Pregabalin] Other (See Comments)     \"made her goofy\"       Current Meds:   Scheduled Meds:    levoFLOXacin  250 mg Oral Daily    diclofenac sodium  2 g Topical BID    insulin detemir  10 Units Subcutaneous BID    methylPREDNISolone  40 mg Intravenous Q8H    albuterol  2.5 mg Nebulization 4x daily    sodium chloride flush  10 mL Intravenous 2 times per day    furosemide  40 mg Oral BID    montelukast  10 mg Oral Nightly    insulin lispro  0-12 Units Subcutaneous TID     insulin lispro  0-6 Units Subcutaneous Nightly    sodium chloride flush  5-40 mL 100%   BMI 48.81 kg/m²   Temp (24hrs), Av.9 °F (36.6 °C), Min:97.2 °F (36.2 °C), Max:98.4 °F (36.9 °C)    Recent Labs     21  1629 21  0603 21  1119   POCGLU 137* 104 82 204*       I/O (24Hr): Intake/Output Summary (Last 24 hours) at 2021 1156  Last data filed at 2021 0311  Gross per 24 hour   Intake 605.33 ml   Output 600 ml   Net 5.33 ml       Labs:  Hematology:  Recent Labs     21  2252 21  0445 21  0629   HGB 6.7* 8.7* 8.2*   HCT 21.0* 26.8* 25.0*     Chemistry:  Recent Labs     21  0520      K 3.7   *   CO2 25   GLUCOSE 115*   BUN 25*   CREATININE 1.27*   ANIONGAP 9   LABGLOM 41*   GFRAA 50*   CALCIUM 6.5*     Recent Labs     21  0607 21  1124 21  1629 21  0603 21  1119   POCGLU 116* 320* 137* 104 82 204*     ABG:  Lab Results   Component Value Date    POCPH 7.26 2016    POCPCO2 56 2016    POCPO2 80 2016    POCHCO3 25.3 2016    NBEA 2 2016    PBEA NOT REPORTED 2016    LOV3HQV 27 2016    HCUN2LAS 93 2016    FIO2 NOT REPORTED 2016         Radiology:  CT ABDOMEN PELVIS W IV CONTRAST Additional Contrast? None    Result Date: 2021  Acute nonspecific pancolitis. Infectious or inflammatory colitis can be considered, less likely ischemic since the major visceral arteries appear patent. XR CHEST PORTABLE    Result Date: 2021  Subtle bibasilar infiltrates representing atelectasis versus pneumonia. XR CHEST 1 VIEW    Result Date: 2021  Minimal left basilar atelectasis. Otherwise, clear lungs. Cardiomegaly. IR INSERT PICC VAD W SQ PORT >5 YEARS    Result Date: 2021  Successful ultrasound and fluoroscopy guided right basilic vein 5 [place] power injectable dual-lumen PICC placement. Ready for use.        Physical Examination:        General appearance:  alert, cooperative and no distress  Mental Status:

## 2021-06-04 NOTE — PROGRESS NOTES
GI Progress notes    6/4/2021   11:42 AM    Name:  Pattie Edwards  MRN:    [id number]     [place]:     [de-identified]   Room:  2006/2006-02  IP Day: 8     Admit Date: 5/25/2021 12:33 PM  PCP: Humaira Jiménez DO    Subjective:     C/C:   Chief Complaint   Patient presents with    GI Bleeding       Interval History: Status: improved. Patient is clinically feeling better although had some drop in hemoglobin  Questionable rectal bleeding yesterday  Had a blood transfusion  Current hemoglobin stable  Tolerating full liquid diet  Denies any significant abdominal pain  Diarrhea seems to have improved a little as well      ROS:  Constitutional: negative for chills, fevers and sweats    Gastrointestinal mild abdominal pain, constipation, positive diarrhea, nausea and vomiting      Medications: Allergies:    Allergies   Allergen Reactions    Aspirin     Ibuprofen     Sulfa Antibiotics     Tape Caesar Ramon Tape]     Lyrica [Pregabalin] Other (See Comments)     \"made her goofy\"       Current Meds: loperamide (IMODIUM) capsule 2 mg, Q6H PRN  oxyCODONE-acetaminophen (PERCOCET) 5-325 MG per tablet 1 tablet, Q6H PRN  oxyCODONE-acetaminophen (PERCOCET) 5-325 MG per tablet 2 tablet, Q6H PRN  levoFLOXacin (LEVAQUIN) tablet 250 mg, Daily  diclofenac sodium (VOLTAREN) 1 % gel 2 g, BID  0.9 % sodium chloride infusion, PRN  guaiFENesin-dextromethorphan (ROBITUSSIN DM) 100-10 MG/5ML syrup 10 mL, Q4H PRN  albuterol (PROVENTIL) nebulizer solution 2.5 mg, Q6H PRN  insulin detemir (LEVEMIR) injection pen 10 Units, BID  methylPREDNISolone sodium (SOLU-MEDROL) injection 40 mg, Q8H  albuterol (PROVENTIL) nebulizer solution 2.5 mg, 4x daily  sodium chloride flush 0.9 % injection 10 mL, 2 times per day  sodium chloride flush 0.9 % injection 10 mL, PRN  furosemide (LASIX) tablet 40 mg, BID  montelukast (SINGULAIR) tablet 10 mg, Nightly  insulin lispro (HUMALOG) injection vial 0-12 Units, TID WC  insulin lispro (HUMALOG) injection vial 0-6 RDW 16.8 06/01/2021     06/01/2021    MPV 10.4 06/01/2021     CBC with Differential:    Lab Results   Component Value Date    WBC 20.9 06/01/2021    RBC 2.45 06/01/2021    HGB 8.2 06/04/2021    HCT 25.0 06/04/2021     06/01/2021    MCV 91.0 06/01/2021    MCH 29.0 06/01/2021    MCHC 31.8 06/01/2021    RDW 16.8 06/01/2021    NRBC 1 04/21/2016    LYMPHOPCT 5 06/01/2021    MONOPCT 5 06/01/2021    MYELOPCT 2 04/21/2016    BASOPCT 0 06/01/2021    MONOSABS 0.98 06/01/2021    LYMPHSABS 1.06 06/01/2021    EOSABS <0.03 06/01/2021    BASOSABS 0.03 06/01/2021    DIFFTYPE NOT REPORTED 06/01/2021     Hemoglobin/Hematocrit:    Lab Results   Component Value Date    HGB 8.2 06/04/2021    HCT 25.0 06/04/2021     CMP:    Lab Results   Component Value Date     06/03/2021    K 3.7 06/03/2021     06/03/2021    CO2 25 06/03/2021    BUN 25 06/03/2021    CREATININE 1.27 06/03/2021    GFRAA 50 06/03/2021    LABGLOM 41 06/03/2021    GLUCOSE 115 06/03/2021    PROT 4.2 06/01/2021    LABALBU 2.6 06/01/2021    CALCIUM 6.5 06/03/2021    BILITOT 0.34 06/01/2021    ALKPHOS 72 06/01/2021    AST 15 06/01/2021    ALT 12 06/01/2021     BMP:    Lab Results   Component Value Date     06/03/2021    K 3.7 06/03/2021     06/03/2021    CO2 25 06/03/2021    BUN 25 06/03/2021    LABALBU 2.6 06/01/2021    CREATININE 1.27 06/03/2021    CALCIUM 6.5 06/03/2021    GFRAA 50 06/03/2021    LABGLOM 41 06/03/2021    GLUCOSE 115 06/03/2021     PT/INR:    Lab Results   Component Value Date    PROTIME 15.8 05/31/2021    INR 1.3 05/31/2021     PTT:    Lab Results   Component Value Date    APTT 22.3 05/31/2021   [APTT}    Physical Examination:        General appearance: alert, obesity cooperative and no distress  Mental Status: oriented to person, place and time and anxious affect    Abdomen: soft, nontender, bloated nondistended, bowel sounds present     Assessment:        Primary Problem  GI (gastrointestinal bleed)     [facility]

## 2021-06-04 NOTE — PROGRESS NOTES
6/1/2021). Restrictions  Restrictions/Precautions  Restrictions/Precautions: General Precautions, Up as Tolerated, Fall Risk  Required Braces or Orthoses?: No  Position Activity Restriction  Other position/activity restrictions:  telemetry, LUE IV, 02  Subjective   General  Chart Reviewed: Yes  Additional Pertinent Hx: OA, Bipolar, CHF, COPD, DM , Fibromyalgia  Family / Caregiver Present: No  Subjective  Subjective: Pt verbalized frustruation with having to participate in therapy. General Comment  Comments: RN states pt is medically appropriate for therapy    Orientation  Objective   Bed mobility  Rolling: Stand by assistance  Supine to sit: Stand by assistance  Scooting: Stand by assistance  Comments: Pt is impulsive and with quick movements requiring max verbal cues to slow pace for safety. Transfers  Sit to Stand: Stand by assistance;Contact guard assistance  Stand to sit: Stand by assistance;Contact guard assistance  Bed to Chair: Stand by assistance;Contact guard assistance  Stand Pivot Transfers: Stand by assistance;Contact guard assistance  Lateral Transfers: Stand by assistance;Contact guard assistance  Comment: Pt agitated with request to get out of bed for therapy demonstrating quick impulsive movements with no regard for safety. Pt required assistance to manage O2 lines. Ambulation  Ambulation?: Yes  More Ambulation?: Yes  Ambulation 1  Surface: level tile  Device: Rollator  Other Apparatus: O2  Assistance: Minimal assistance  Quality of Gait: Pt is impulsive and demonstrates decreased safety, required assist for O2 line as   Gait Deviations: Staggers  Distance: 25ft  Ambulation 2  Surface - 2: level tile  Device 2: Rollator  Other Apparatus 2: O2  Assistance 2: Minimal assistance  Quality of Gait 2: impulsive with no regard for safety  Distance: 10ft  Comments: ambulation from bathroom to chair.   Stairs/Curb  Stairs?: No     Balance  Posture: Good  Sitting - Static: Good  Sitting - Dynamic:

## 2021-06-04 NOTE — CARE COORDINATION
Social Work-[employer] is rich;id for [employer] through [employer]. If patient is dc on weekend, call [phone] to arrange. If unable to reach someone in admissions at [place], [place] can be reached at [phone] . The fax is [phone]. Notify family at [place].  Hillary Hunt

## 2021-06-04 NOTE — PLAN OF CARE
Problem: Falls - Risk of:  Goal: Will remain free from falls  Description: Will remain free from falls  6/4/2021 1423 by Lisa Osborn RN  Outcome: Ongoing  Note: Siderails up x 2  Hourly rounding  Call light in reach  Instructed to call for assist before attempting out of bed. Remains free from falls and accidental injury at this time   Floor free from obstacles  Bed is locked and in lowest position  Adequate lighting provided  Bed alarm on, Red Falling star and Stay with Me signs posted         Problem: Pain:  Goal: Patient's pain/discomfort is manageable  Description: Patient's pain/discomfort is manageable  6/4/2021 1423 by Lisa Osborn RN  Outcome: Ongoing  Note: Pain level assessment complete.    Patient educated on pain scale and control interventions  PRN pain medication given per patient request  Patient instructed to call out with new onset of pain or unrelieved pain       Problem: Daily Care:  Goal: Daily care needs are met  Description: Daily care needs are met  6/4/2021 1423 by Lisa Osborn RN  Outcome: Ongoing

## 2021-06-04 NOTE — PLAN OF CARE
Problem: Falls - Risk of:  Goal: Will remain free from falls  Description: Will remain free from falls  Outcome: Ongoing     Problem: Falls - Risk of:  Goal: Absence of physical injury  Description: Absence of physical injury  Outcome: Ongoing     Problem: Skin Integrity:  Goal: Will show no infection signs and symptoms  Description: Will show no infection signs and symptoms  Outcome: Ongoing     Problem: Skin Integrity:  Goal: Absence of new skin breakdown  Description: Absence of new skin breakdown  Outcome: Ongoing     Problem: Skin Integrity:  Goal: Skin integrity will improve  Description: Skin integrity will improve  Outcome: Ongoing     Problem: Skin Integrity:  Goal: Signs of wound healing will improve  Description: Signs of wound healing will improve  Outcome: Ongoing     Problem: Infection:  Goal: Will remain free from infection  Description: Will remain free from infection  Outcome: Ongoing     Problem: Safety:  Goal: Free from accidental physical injury  Description: Free from accidental physical injury  Outcome: Ongoing     Problem: Safety:  Goal: Free from intentional harm  Description: Free from intentional harm  Outcome: Ongoing     Problem: Safety:  Goal: Ability to remain free from injury will improve  Description: Ability to remain free from injury will improve  Outcome: Ongoing     Problem: Daily Care:  Goal: Daily care needs are met  Description: Daily care needs are met  Outcome: Ongoing     Problem: Pain:  Goal: Patient's pain/discomfort is manageable  Description: Patient's pain/discomfort is manageable  Outcome: Ongoing     Problem: Pain:  Goal: Pain level will decrease  Description: Pain level will decrease  Outcome: Ongoing     Problem: Pain:  Goal: Control of acute pain  Description: Control of acute pain  Outcome: Ongoing     Problem: Pain:  Goal: Control of chronic pain  Description: Control of chronic pain  Outcome: Ongoing     Problem: Skin Integrity:  Goal: Skin integrity will bleeding  Outcome: Ongoing     Problem: Physical Regulation:  Goal: Signs and symptoms of infection will decrease  Description: Signs and symptoms of infection will decrease  Outcome: Ongoing     Problem: Physical Regulation:  Goal: Complications related to the disease process, condition or treatment will be avoided or minimized  Description: Complications related to the disease process, condition or treatment will be avoided or minimized  Outcome: Ongoing     Problem: Sensory:  Goal: Pain level will decrease  Description: Pain level will decrease  Outcome: Ongoing     Problem: Sensory:  Goal: General experience of comfort will improve  Description: General experience of comfort will improve  Outcome: Ongoing     Problem: Tissue Perfusion:  Goal: Ability to maintain a stable neurologic state will improve  Description: Ability to maintain a stable neurologic state will improve  Outcome: Ongoing     Problem: Tissue Perfusion:  Goal: Ability to maintain adequate tissue perfusion will improve  Description: Ability to maintain adequate tissue perfusion will improve  Outcome: Ongoing

## 2021-06-05 VITALS
BODY MASS INDEX: 46.12 KG/M2 | TEMPERATURE: 98.4 F | RESPIRATION RATE: 18 BRPM | SYSTOLIC BLOOD PRESSURE: 139 MMHG | HEIGHT: 55 IN | DIASTOLIC BLOOD PRESSURE: 47 MMHG | OXYGEN SATURATION: 98 % | WEIGHT: 199.3 LBS | HEART RATE: 78 BPM

## 2021-06-05 PROBLEM — G89.29 CHRONIC IDIOPATHIC PAIN SYNDROME: Status: ACTIVE | Noted: 2021-06-05

## 2021-06-05 LAB
ABO/RH: NORMAL
ANTIBODY SCREEN: NEGATIVE
ARM BAND NUMBER: NORMAL
BLD PROD TYP BPU: NORMAL
CROSSMATCH RESULT: NORMAL
DISPENSE STATUS BLOOD BANK: NORMAL
EXPIRATION DATE: NORMAL
GLUCOSE BLD-MCNC: 170 MG/DL (ref 65–105)
GLUCOSE BLD-MCNC: 261 MG/DL (ref 65–105)
HCT VFR BLD CALC: 25.1 % (ref 36.3–47.1)
HCT VFR BLD CALC: 26 % (ref 36.3–47.1)
HEMOGLOBIN: 7.9 G/DL (ref 11.9–15.1)
HEMOGLOBIN: 8.2 G/DL (ref 11.9–15.1)
TRANSFUSION STATUS: NORMAL
UNIT DIVISION: 0
UNIT NUMBER: NORMAL

## 2021-06-05 PROCEDURE — 2580000003 HC RX 258: Performed by: INTERNAL MEDICINE

## 2021-06-05 PROCEDURE — 6360000002 HC RX W HCPCS: Performed by: INTERNAL MEDICINE

## 2021-06-05 PROCEDURE — 6370000000 HC RX 637 (ALT 250 FOR IP): Performed by: INTERNAL MEDICINE

## 2021-06-05 PROCEDURE — 99239 HOSP IP/OBS DSCHRG MGMT >30: CPT | Performed by: INTERNAL MEDICINE

## 2021-06-05 PROCEDURE — 2700000000 HC OXYGEN THERAPY PER DAY

## 2021-06-05 PROCEDURE — APPSS30 APP SPLIT SHARED TIME 16-30 MINUTES: Performed by: NURSE PRACTITIONER

## 2021-06-05 PROCEDURE — 85014 HEMATOCRIT: CPT

## 2021-06-05 PROCEDURE — 94640 AIRWAY INHALATION TREATMENT: CPT

## 2021-06-05 PROCEDURE — 85018 HEMOGLOBIN: CPT

## 2021-06-05 PROCEDURE — 99232 SBSQ HOSP IP/OBS MODERATE 35: CPT | Performed by: INTERNAL MEDICINE

## 2021-06-05 PROCEDURE — 36415 COLL VENOUS BLD VENIPUNCTURE: CPT

## 2021-06-05 PROCEDURE — 82947 ASSAY GLUCOSE BLOOD QUANT: CPT

## 2021-06-05 RX ORDER — FUROSEMIDE 40 MG/1
40 TABLET ORAL 2 TIMES DAILY
Qty: 60 TABLET | Refills: 3 | DISCHARGE
Start: 2021-06-05

## 2021-06-05 RX ORDER — MULTIVIT-MIN/FA/LYCOPEN/LUTEIN .4-300-25
1 TABLET ORAL DAILY
DISCHARGE
Start: 2021-06-05

## 2021-06-05 RX ORDER — NICOTINE 21 MG/24HR
1 PATCH, TRANSDERMAL 24 HOURS TRANSDERMAL DAILY
Qty: 30 PATCH | Refills: 3 | DISCHARGE
Start: 2021-06-06

## 2021-06-05 RX ORDER — PANTOPRAZOLE SODIUM 40 MG/1
40 TABLET, DELAYED RELEASE ORAL DAILY
Qty: 30 TABLET | Refills: 3 | DISCHARGE
Start: 2021-06-06

## 2021-06-05 RX ORDER — NICOTINE 21 MG/24HR
1 PATCH, TRANSDERMAL 24 HOURS TRANSDERMAL DAILY
Status: DISCONTINUED | OUTPATIENT
Start: 2021-06-05 | End: 2021-06-05 | Stop reason: HOSPADM

## 2021-06-05 RX ORDER — PREDNISONE 10 MG/1
TABLET ORAL
Refills: 0 | DISCHARGE
Start: 2021-06-05 | End: 2021-07-10

## 2021-06-05 RX ORDER — LORAZEPAM 0.5 MG/1
0.25 TABLET ORAL 2 TIMES DAILY PRN
Qty: 10 TABLET | Refills: 0 | Status: SHIPPED | OUTPATIENT
Start: 2021-06-05 | End: 2021-07-05

## 2021-06-05 RX ORDER — LEVOFLOXACIN 250 MG/1
250 TABLET ORAL DAILY
Qty: 2 TABLET | Refills: 0 | DISCHARGE
Start: 2021-06-06 | End: 2021-06-08

## 2021-06-05 RX ORDER — OXYCODONE AND ACETAMINOPHEN 7.5; 325 MG/1; MG/1
1 TABLET ORAL EVERY 6 HOURS PRN
Status: ON HOLD | COMMUNITY
End: 2021-06-05 | Stop reason: SDUPTHER

## 2021-06-05 RX ORDER — OXYCODONE AND ACETAMINOPHEN 7.5; 325 MG/1; MG/1
1 TABLET ORAL EVERY 6 HOURS PRN
Qty: 20 TABLET | Refills: 0 | Status: SHIPPED | OUTPATIENT
Start: 2021-06-05 | End: 2021-07-05

## 2021-06-05 RX ADMIN — FUROSEMIDE 40 MG: 40 TABLET ORAL at 08:21

## 2021-06-05 RX ADMIN — BUDESONIDE AND FORMOTEROL FUMARATE DIHYDRATE 1 PUFF: 160; 4.5 AEROSOL RESPIRATORY (INHALATION) at 08:17

## 2021-06-05 RX ADMIN — SODIUM CHLORIDE, PRESERVATIVE FREE 10 ML: 5 INJECTION INTRAVENOUS at 08:21

## 2021-06-05 RX ADMIN — FLUTICASONE PROPIONATE 1 SPRAY: 50 SPRAY, METERED NASAL at 08:23

## 2021-06-05 RX ADMIN — INSULIN LISPRO 6 UNITS: 100 INJECTION, SOLUTION INTRAVENOUS; SUBCUTANEOUS at 12:08

## 2021-06-05 RX ADMIN — TIOTROPIUM BROMIDE INHALATION SPRAY 2 PUFF: 3.12 SPRAY, METERED RESPIRATORY (INHALATION) at 08:21

## 2021-06-05 RX ADMIN — TIZANIDINE 2 MG: 2 TABLET ORAL at 08:21

## 2021-06-05 RX ADMIN — SODIUM CHLORIDE, PRESERVATIVE FREE 10 ML: 5 INJECTION INTRAVENOUS at 01:57

## 2021-06-05 RX ADMIN — LEVOFLOXACIN 250 MG: 500 TABLET, FILM COATED ORAL at 12:08

## 2021-06-05 RX ADMIN — DULOXETINE HYDROCHLORIDE 90 MG: 60 CAPSULE, DELAYED RELEASE ORAL at 08:21

## 2021-06-05 RX ADMIN — METHYLPREDNISOLONE SODIUM SUCCINATE 40 MG: 40 INJECTION, POWDER, FOR SOLUTION INTRAMUSCULAR; INTRAVENOUS at 08:28

## 2021-06-05 RX ADMIN — MAGNESIUM GLUCONATE 500 MG ORAL TABLET 400 MG: 500 TABLET ORAL at 08:20

## 2021-06-05 RX ADMIN — HYDROXYZINE HYDROCHLORIDE 25 MG: 25 TABLET, FILM COATED ORAL at 08:20

## 2021-06-05 RX ADMIN — ALBUTEROL SULFATE 2.5 MG: 2.5 SOLUTION RESPIRATORY (INHALATION) at 08:16

## 2021-06-05 RX ADMIN — SODIUM CHLORIDE, PRESERVATIVE FREE 10 ML: 5 INJECTION INTRAVENOUS at 08:24

## 2021-06-05 RX ADMIN — OXYCODONE AND ACETAMINOPHEN 2 TABLET: 5; 325 TABLET ORAL at 03:42

## 2021-06-05 RX ADMIN — OXYCODONE AND ACETAMINOPHEN 2 TABLET: 5; 325 TABLET ORAL at 10:10

## 2021-06-05 RX ADMIN — PANTOPRAZOLE SODIUM 40 MG: 40 TABLET, DELAYED RELEASE ORAL at 08:21

## 2021-06-05 RX ADMIN — METHYLPREDNISOLONE SODIUM SUCCINATE 40 MG: 40 INJECTION, POWDER, FOR SOLUTION INTRAMUSCULAR; INTRAVENOUS at 01:57

## 2021-06-05 RX ADMIN — INSULIN LISPRO 2 UNITS: 100 INJECTION, SOLUTION INTRAVENOUS; SUBCUTANEOUS at 08:21

## 2021-06-05 RX ADMIN — BUPROPION HYDROCHLORIDE 150 MG: 150 TABLET, EXTENDED RELEASE ORAL at 08:21

## 2021-06-05 RX ADMIN — ALBUTEROL SULFATE 2.5 MG: 2.5 SOLUTION RESPIRATORY (INHALATION) at 11:11

## 2021-06-05 RX ADMIN — DICLOFENAC SODIUM 2 G: 10 GEL TOPICAL at 08:29

## 2021-06-05 RX ADMIN — CETIRIZINE HYDROCHLORIDE 10 MG: 10 TABLET, FILM COATED ORAL at 08:20

## 2021-06-05 ASSESSMENT — PAIN DESCRIPTION - ONSET
ONSET: ON-GOING
ONSET: ON-GOING

## 2021-06-05 ASSESSMENT — PAIN SCALES - GENERAL
PAINLEVEL_OUTOF10: 9
PAINLEVEL_OUTOF10: 8
PAINLEVEL_OUTOF10: 8

## 2021-06-05 ASSESSMENT — PAIN DESCRIPTION - LOCATION
LOCATION: BACK;SHOULDER
LOCATION: GENERALIZED

## 2021-06-05 ASSESSMENT — PAIN DESCRIPTION - ORIENTATION: ORIENTATION: RIGHT;LEFT

## 2021-06-05 ASSESSMENT — PAIN DESCRIPTION - PROGRESSION: CLINICAL_PROGRESSION: NOT CHANGED

## 2021-06-05 ASSESSMENT — PAIN DESCRIPTION - DESCRIPTORS
DESCRIPTORS: ACHING
DESCRIPTORS: ACHING;DISCOMFORT

## 2021-06-05 ASSESSMENT — PAIN DESCRIPTION - FREQUENCY
FREQUENCY: CONTINUOUS
FREQUENCY: CONTINUOUS

## 2021-06-05 ASSESSMENT — PAIN DESCRIPTION - PAIN TYPE
TYPE: CHRONIC PAIN
TYPE: CHRONIC PAIN

## 2021-06-05 ASSESSMENT — PAIN - FUNCTIONAL ASSESSMENT: PAIN_FUNCTIONAL_ASSESSMENT: PREVENTS OR INTERFERES SOME ACTIVE ACTIVITIES AND ADLS

## 2021-06-05 NOTE — PROGRESS NOTES
Writer attempted to call report (x3) to [address] [place] [address] at [phone]. The first attempt, the call was answered, Kaylyn Nolasco was placed on hold to transfer to receiving unit. Writer remained on hold for over 5 minutes. Two more attempts were made to call report. The phone continued to ring both times, without being answered.

## 2021-06-05 NOTE — PLAN OF CARE
Nutrition Problem #1: Altered GI function  Intervention: Food and/or Nutrient Delivery: Continue Current Diet, Modify Oral Nutrition Supplement  Nutritional Goals: PO intakes are greater than 75% at meals

## 2021-06-05 NOTE — PLAN OF CARE
Problem: Falls - Risk of:  Goal: Will remain free from falls  Description: Will remain free from falls  6/5/2021 1510 by Hanh Greene RN  Outcome: Ongoing  Note: Room free of clutter  Hourly rounding   Non-skid socks worn  Side rails up x2  Bed low and locked  Call light in reach  Instructed to call out before getting out of bed  Anticipatory needs met  Bed alarm on  Falling star at the door and on wristband       Problem: Skin Integrity:  Goal: Will show no infection signs and symptoms  Description: Will show no infection signs and symptoms  6/5/2021 1510 by Hanh Greene RN  Outcome: Ongoing     Problem: Infection:  Goal: Will remain free from infection  Description: Will remain free from infection  6/5/2021 1510 by Hanh Greene RN  Outcome: Ongoing     Problem: Safety:  Goal: Free from accidental physical injury  Description: Free from accidental physical injury  6/5/2021 1510 by Hanh Greene RN  Outcome: Ongoing  Note: Proper pt identification  Hourly rounding performed  Anticipatory needs met  Non-skid socks worn  Proper transferring technique  2/4 side rails up  Personal necessities within reach  Bed low and locked  Call light in reach  Proper lighting  Room free of clutter  Continue to monitor         Problem: Daily Care:  Goal: Daily care needs are met  Description: Daily care needs are met  6/5/2021 1510 by Hanh Greene RN  Outcome: Ongoing  Note: Assess patient self-care activities  Encourage patient to perform ADL's as tolerated  Include family when possible       Problem: Pain:  Goal: Patient's pain/discomfort is manageable  Description: Patient's pain/discomfort is manageable  6/5/2021 1510 by Hanh Greene RN  Outcome: Ongoing     Problem: Discharge Planning:  Goal: Patients continuum of care needs are met  Description: Patients continuum of care needs are met  6/5/2021 1510 by Hanh Greene RN  Outcome: Ongoing  Note: Identify

## 2021-06-05 NOTE — PROGRESS NOTES
Nutrition Assessment     Type and Reason for Visit: Reassess    Nutrition Recommendations/Plan:   1. Continue  ADULT DIET; Easy to Chew; 4 carb choices (60 gm/meal)  2. Discontinue Ensure Clear and start Ensure Enlive 3x/day  3. Monitor p.o intakes, diet tolerance, diarrhea status and labs    Nutrition Assessment:  Patient tolerated Full liquid diet well and ate % at breakfast (6/4). Diet has been advanced to Easy to Chew; 4 carbs/meal. Ensure Clear discontinue and Ensure Enlive ordered for 3x/day. Monitor p.o intakes, diet tolerance, diarrhea status and labs. Malnutrition Assessment:  Malnutrition Status: At risk for malnutrition (Comment)    Estimated Daily Nutrient Needs:  Energy (kcal): 1230 kcal ([place], stress factor 1.1); Weight Used for Energy Requirements:  Other (Comment) (80.8 kg)     Protein (g): 68 grams (2.0g/kg); Weight Used for Protein Requirements:  Ideal        Nutrition Related Findings: Edema: trace BLE. GI status: active bowel sounds, diarrhea is improving.  Labs: Hgb: 8.2 (L)      Current Nutrition Therapies:    ADULT DIET; Easy to Chew; 4 carb choices (60 gm/meal)  Adult Oral Nutrition Supplement; Standard High Calorie/High Protein Oral Supplement    Anthropometric Measures:  · Height: 4' 7\" (139.7 cm)  · Current Body Wt: 210 lb (95.3 kg)   · BMI: 48.8    Nutrition Diagnosis:   · Altered GI function related to altered GI function (G I bleed) as evidenced by lab values, intake 0-25%, diarrhea      Nutrition Interventions:   Food and/or Nutrient Delivery:  Continue Current Diet, Modify Oral Nutrition Supplement  Nutrition Education/Counseling:  Education not indicated   Coordination of Nutrition Care:  Continue to monitor while inpatient    Goals:  PO intakes are greater than 75% at meals       Nutrition Monitoring and Evaluation:   Food/Nutrient Intake Outcomes:  Food and Nutrient Intake, Supplement Intake  Physical Signs/Symptoms Outcomes:  Biochemical Data, Fluid Status or Edema, Skin, Weight, Diarrhea     Discharge Planning:    Continue current diet, Continue Oral Nutrition Supplement         Camilo BUTTSN, RDN, LDN  Lead Clinical Dietitian  RD Office Phone [phone]

## 2021-06-05 NOTE — PROGRESS NOTES
Occupational [address]  Occupational Therapy Not Seen Note    Patient not available for Occupational Therapy due to:    [] Testing:    [] Hemodialysis    [] Cancelled by RN:    [x]Refusal by Patient: With mac encouragement and education on importance, Pt refused tx due to pain and being \"exhausted. \" RN stated she recently received pain meds     [] Surgery:     [] Intubation:     [] Pain Medication:    [] Sedation:     [] Spine Precautions :    [] Medical Instability:    [] Other:

## 2021-06-05 NOTE — PROGRESS NOTES
Learning About the Safe Use of Antibiotics  Introduction  Antibiotics are drugs used to kill bacteria. Bacteria can cause infections. These include strep throat, ear infections, and pneumonia. These medicines can't cure everything. They don't kill viruses or help with allergies. They don't help illnesses such as the common cold, the flu, or a runny nose. And they can cause side effects. There are many types of antibiotics. Your doctor will decide which one will work best for your infection. Examples include:  · Amoxicillin. · Cephalexin (Keflex). · Ciprofloxacin (Cipro). What are the possible side effects? Side effects can include:  · Nausea. · Diarrhea. · Skin rash. · Yeast infection. · A severe allergic reaction. It may cause itching, swelling, and breathing problems. This is rare. You may have other side effects or reactions not listed here. Check the information that comes with your medicine. Should you take antibiotics just in case? Don't take antibiotics when you don't need them. If you do that, they may not work when you do need them. Each time you take antibiotics, you are more likely to have some bacteria that survive and aren't killed by the medicine. Bacteria that don't die can change and become even harder to kill. These are called antibiotic-resistant bacteria. They can cause longer and more serious infections. To treat them, you may need different, stronger antibiotics that have more side effects and may cost more. So always ask your doctor if antibiotics are the best treatment. Explain that you do not want antibiotics unless you need them. Help protect the community  Using antibiotics when they're not needed leads to the development of antibiotic-resistant bacteria. These tougher bacteria can spread to family members, children, and coworkers. People in your community will have a risk of getting an infection that is harder to cure and that costs more to treat.   How can you take antibiotics safely? Be safe with medicine. Take your antibiotics as directed. Do not stop taking them just because you feel better. You need to take the full course of medicine. This will help make sure your infection is cured. It will also help prevent the growth of antibiotic-resistant bacteria. Always take the exact amount that the label says to take. If the label says to take the medicine at a certain time, follow those directions. You might feel better after you take an antibiotic for a few days. But it is important to keep taking it for as long as prescribed. That will help you get rid of those bacteria that are a bit stronger and that survive the first few days of treatment. Where can you learn more? Go to [email or web address]. org and sign in to your [employer] account. Enter G300 in the [employer] box to learn more about \"Learning About the Safe Use of Antibiotics. \"     If you do not have an account, please click on the \"Sign Up Now\" link. Current as of: March 3, 2017  Content Version: 11.3  © 4737-6173 [employer]. Care instructions adapted under license by [facility] ([facility]). If you have questions about a medical condition or this instruction, always ask your healthcare professional. [place] [address] any warranty or liability for your use of this information. Antibiotics are powerful drugs that are generally safe and very helpful in fighting disease, but there are times when antibiotics can actually be harmful. Antibiotics can have side effects, including allergic reactions and a potentially deadly diarrhea caused by the bacteria Clostridium difficile (C. diff). Antibiotics can also interfere with the action of other drugs a patient may be taking for another condition. These unintended reactions to antibiotics are called adverse drug events.    When someone takes an antibiotic that they do not need, they are needlessly exposed to the side effects of the drug and do not get any benefit from it. Moreover, taking an antibiotic when it is not needed can lead to the development of antibiotic resistance. When resistance develops, antibiotics may not be able to stop future infections. Every time someone takes an antibiotic they dont need, they increase their risk of developing a resistant infection in the future. Types of Adverse Drug Events Related to Antibiotics  Allergic Reactions  Every year, there are more than 140,000 emergency department visits for reactions to antibiotics. Almost four out of five (79%) emergency department visits for antibiotic-related adverse drug events are due to an allergic reaction. These reactions can range from mild rashes and itching to serious blistering skin reactions swelling of the face and throat, and breathing problems. Minimizing unnecessary antibiotic use is the best way to reduce the risk of adverse drug events from antibiotics. Patients should tell their doctors about any past drug reactions or allergies. C. difficile  C. difficile causes diarrhea linked to at least 14,000 [place] deaths each year. When a person takes antibiotics, good bacteria that protect against infection are destroyed for several months. During this time, patients can get sick from C. difficile picked up from contaminated surfaces or spread from a healthcare providers hands. Those most at risk are people, especially older adults, who take antibiotics and also get medical care. Take antibiotics exactly and only as prescribed. Drug Interactions and Side Effects  Antibiotics can interact with other drugs patients take, making those drugs or the antibiotics less effective. Some drug combinations can worsen the side effects of the antibiotic or other drug. Common side effects of antibiotics include nausea, diarrhea, and stomach pain. Sometimes these symptoms can lead to dehydration and other problems.  Patients should ask their doctors about drug interactions and the potential side effects of antibiotics.  The doctor should be told immediately if a patient has any side effects from antibiotics  Page last updated: February 24, 2017 Content source:   Centers for Disease Control and [employer] for Emerging and Zoonotic Infectious Diseases (Essence Freed)  Division of Healthcare Quality Promotion [facility], [place])

## 2021-06-05 NOTE — DISCHARGE SUMMARY
06/05/2021    MCV 91.0 06/01/2021    MCH 29.0 06/01/2021    MCHC 31.8 06/01/2021    RDW 16.8 06/01/2021     06/01/2021     CMP:    Lab Results   Component Value Date    GLUCOSE 115 06/03/2021     06/03/2021    K 3.7 06/03/2021     06/03/2021    CO2 25 06/03/2021    BUN 25 06/03/2021    CREATININE 1.27 06/03/2021    ANIONGAP 9 06/03/2021    ALKPHOS 72 06/01/2021    ALT 12 06/01/2021    AST 15 06/01/2021    BILITOT 0.34 06/01/2021    LABALBU 2.6 06/01/2021    ALBUMIN NOT REPORTED 06/01/2021    LABGLOM 41 06/03/2021    GFRAA 50 06/03/2021    GFR      06/03/2021    GFR NOT REPORTED 06/03/2021    PROT 4.2 06/01/2021    CALCIUM 6.5 06/03/2021        Radiology:  XR CHEST PORTABLE    Result Date: 6/4/2021  No acute cardiopulmonary abnormality. Stable heart size at upper limits of normal. Right approach PICC line in satisfactory position. XR CHEST PORTABLE    Result Date: 6/2/2021  Interval development of infiltrate in the right lower lobe compared to the prior study     XR CHEST 1 VIEW    Result Date: 5/29/2021  Minimal left basilar atelectasis. Otherwise, clear lungs. Cardiomegaly. Consultations:    Consults:     Final Specialist Recommendations/Findings:   IP CONSULT TO INTERNAL MEDICINE  IP CONSULT TO GI      The patient was seen and examined on day of discharge and this discharge summary is in conjunction with any daily progress note from day of discharge.     Discharge plan:     Disposition: snf    Physician Follow Up:     [address],  [facility]: [phone]  Fax: [address] [place] [address] Street, [address] [facility]  [phone]    Schedule an appointment as soon as possible for a visit in 2 weeks  Molly Smith [address], [address] [address] [place] [address]    Schedule an appointment as: VOLTAREN     DULoxetine 30 MG extended release capsule  Commonly known as: CYMBALTA     ferrous sulfate 325 (65 Fe) MG tablet  Commonly known as: IRON 325     fluticasone 50 MCG/ACT nasal spray  Commonly known as: FLONASE  1 spray by Nasal route daily     hydrOXYzine 25 MG tablet  Commonly known as: ATARAX     Incruse Ellipta 62.5 MCG/INH Aepb  Generic drug: Umeclidinium Bromide     JANUVIA PO     LEVEMIR FLEXPEN SC     levocetirizine 5 MG tablet  Commonly known as: XYZAL     Loperamide A-D 2 MG tablet  Generic drug: loperamide     LORazepam 0.5 MG tablet  Commonly known as: ATIVAN  Take 0.5 tablets by mouth 2 times daily as needed for Anxiety for up to 30 days. magnesium oxide 400 MG tablet  Commonly known as: MAG-OX     Melatonin 10 MG Tabs     metoprolol succinate 25 MG extended release tablet  Commonly known as: TOPROL XL     mirtazapine 15 MG tablet  Commonly known as: REMERON     nortriptyline 10 MG capsule  Commonly known as: PAMELOR     NovoLOG FlexPen 100 UNIT/ML injection pen  Generic drug: insulin aspart     oxyCODONE-acetaminophen 7.5-325 MG per tablet  Commonly known as: PERCOCET  Take 1 tablet by mouth every 6 hours as needed for Pain for up to 30 days. polyethylene glycol 17 g packet  Commonly known as: GLYCOLAX     RA [facility] Tabs  Take 1 tablet by mouth daily     Symbicort 160-4.5 MCG/ACT Aero  Generic drug: budesonide-formoterol     tiZANidine 2 MG tablet  Commonly known as: ZANAFLEX     Trulicity 1.5 XR/9.5XR Sopn  Generic drug: Dulaglutide     vitamin D 50 MCG (2000 UT) Caps capsule     zonisamide 25 MG capsule  Commonly known as: Nora Isaac         * This list has 2 medication(s) that are the same as other medications prescribed for you. Read the directions carefully, and ask your doctor or other care provider to review them with you.             STOP taking these medications    busPIRone 10 MG tablet  Commonly known as: BUSPAR     cetirizine 10 MG tablet  Commonly known as: ZYRTEC     omeprazole 20 MG delayed release capsule  Commonly known as: PriLOSEC     tiotropium 18 MCG inhalation capsule  Commonly known as: SPIRIVA     traZODone 50 MG tablet  Commonly known as: DESYREL           Where to Get Your Medications      You can get these medications from any pharmacy    Bring a paper prescription for each of these medications  LORazepam 0.5 MG tablet  oxyCODONE-acetaminophen 7.5-325 MG per tablet     Information about where to get these medications is not yet available    Ask your nurse or doctor about these medications  apixaban 5 MG Tabs tablet  furosemide 40 MG tablet  levoFLOXacin 250 MG tablet  nicotine 21 MG/24HR  pantoprazole 40 MG tablet  predniSONE 10 MG tablet  RA [facility] Tabs         No discharge procedures on file. Time Spent on discharge is  40 mins in patient examination, evaluation, counseling as well as medication reconciliation, prescriptions for required medications, discharge plan and follow up. Electronically signed by   Michael Li DO  6/5/2021  1:03 PM      Thank you Dr. Blair Keita DO for the opportunity to be involved in this patient's care.

## 2021-06-05 NOTE — PLAN OF CARE
Problem: Falls - Risk of:  Goal: Will remain free from falls  Description: Will remain free from falls  Outcome: Ongoing  Goal: Absence of physical injury  Description: Absence of physical injury  Outcome: Ongoing     Problem: Skin Integrity:  Goal: Will show no infection signs and symptoms  Description: Will show no infection signs and symptoms  Outcome: Ongoing  Goal: Absence of new skin breakdown  Description: Absence of new skin breakdown  Outcome: Ongoing  Goal: Skin integrity will improve  Description: Skin integrity will improve  Outcome: Ongoing  Goal: Signs of wound healing will improve  Description: Signs of wound healing will improve  Outcome: Ongoing     Problem: Infection:  Goal: Will remain free from infection  Description: Will remain free from infection  Outcome: Ongoing     Problem: Safety:  Goal: Free from accidental physical injury  Description: Free from accidental physical injury  Outcome: Ongoing  Goal: Free from intentional harm  Description: Free from intentional harm  Outcome: Ongoing  Goal: Ability to remain free from injury will improve  Description: Ability to remain free from injury will improve  Outcome: Ongoing     Problem: Daily Care:  Goal: Daily care needs are met  Description: Daily care needs are met  Outcome: Ongoing     Problem: Pain:  Goal: Patient's pain/discomfort is manageable  Description: Patient's pain/discomfort is manageable  Outcome: Ongoing  Goal: Pain level will decrease  Description: Pain level will decrease  Outcome: Ongoing  Goal: Control of acute pain  Description: Control of acute pain  Outcome: Ongoing  Goal: Control of chronic pain  Description: Control of chronic pain  Outcome: Ongoing     Problem: Skin Integrity:  Goal: Skin integrity will stabilize  Description: Skin integrity will stabilize  Outcome: Ongoing     Problem: Discharge Planning:  Goal: Patients continuum of care needs are met  Description: Patients continuum of care needs are met  Outcome: Ongoing     Problem: Nutrition  Goal: Optimal nutrition therapy  Outcome: Ongoing     Problem: Discharge Planning:  Goal: Discharged to appropriate level of care  Description: Discharged to appropriate level of care  Outcome: Ongoing     Problem: Serum Glucose Level - Abnormal:  Goal: Ability to maintain appropriate glucose levels will improve  Description: Ability to maintain appropriate glucose levels will improve  Outcome: Ongoing     Problem: Sensory Perception - Impaired:  Goal: Ability to maintain a stable neurologic state will improve  Description: Ability to maintain a stable neurologic state will improve  Outcome: Ongoing     Problem: IP BALANCE  Goal: LTG - patient will maintain standing balance to allow for completion of daily activities  Outcome: Ongoing     Problem: IP BALANCE  Goal: LTG - Patient will maintain balance to allow for safe/functional mobility  Outcome: Ongoing     Problem: Bleeding:  Goal: Will show no signs and symptoms of excessive bleeding  Description: Will show no signs and symptoms of excessive bleeding  Outcome: Ongoing     Problem: Activity:  Goal: Fatigue will decrease  Description: Fatigue will decrease  Outcome: Ongoing  Goal: Ability to tolerate increased activity will improve  Description: Ability to tolerate increased activity will improve  Outcome: Ongoing  Goal: Ability to maintain optimal joint mobility will improve  Description: Ability to maintain optimal joint mobility will improve  Outcome: Ongoing     Problem:  Bowel/Gastric:  Goal: Ability to achieve a regular elimination pattern will improve  Description: Ability to achieve a regular elimination pattern will improve  Outcome: Ongoing     Problem: Cardiac:  Goal: Ability to maintain an adequate cardiac output will improve  Description: Ability to maintain an adequate cardiac output will improve  Outcome: Ongoing  Goal: Ability to maintain adequate ventilation will improve  Description: Ability to maintain adequate ventilation will improve  Outcome: Ongoing  Goal: Ability to achieve and maintain adequate cardiopulmonary perfusion will improve  Description: Ability to achieve and maintain adequate cardiopulmonary perfusion will improve  Outcome: Ongoing     Problem: Coping:  Goal: Ability to adjust to condition or change in health will improve  Description: Ability to adjust to condition or change in health will improve  Outcome: Ongoing  Goal: Communication of feelings regarding changes in body function or appearance will improve  Description: Communication of feelings regarding changes in body function or appearance will improve  Outcome: Ongoing     Problem: Health Behavior:  Goal: Identification of resources available to assist in meeting health care needs will improve  Description: Identification of resources available to assist in meeting health care needs will improve  Outcome: Ongoing     Problem: Nutritional:  Goal: Maintenance of adequate nutrition will improve  Description: Maintenance of adequate nutrition will improve  Outcome: Ongoing     Problem: Physical Regulation:  Goal: Will show no signs and symptoms of excessive bleeding  Description: Will show no signs and symptoms of excessive bleeding  Outcome: Ongoing  Goal: Signs and symptoms of infection will decrease  Description: Signs and symptoms of infection will decrease  Outcome: Ongoing  Goal: Complications related to the disease process, condition or treatment will be avoided or minimized  Description: Complications related to the disease process, condition or treatment will be avoided or minimized  Outcome: Ongoing     Problem: Sensory:  Goal: Pain level will decrease  Description: Pain level will decrease  Outcome: Ongoing  Goal: General experience of comfort will improve  Description: General experience of comfort will improve  Outcome: Ongoing     Problem: Tissue Perfusion:  Goal: Ability to maintain a stable neurologic state will improve  Description: Ability to maintain a stable neurologic state will improve  Outcome: Ongoing  Goal: Ability to maintain adequate tissue perfusion will improve  Description: Ability to maintain adequate tissue perfusion will improve  Outcome: Ongoing

## 2021-06-05 NOTE — PROGRESS NOTES
Pt discharged to [address] [place] [address] in stable condition with belongings  Pt denies having any further questions at this time  Locked up home medication(s)/personal items given to patient at discharge  Patient/family state they have everything they were admitted with.   [facility] to transport

## 2021-06-05 NOTE — PROGRESS NOTES
[facility]    Gastroenterology Daily Progress Note      Patient:   Ivy Doyle   :    1945   Facility:   [facility]  Date:     2021  Consultant:   LONDON Powell CNP, CNP      SUBJECTIVE  77 y.o. female admitted 2021 with GI (gastrointestinal bleed) [K92.2] and seen for colitis. The pt was seen and examined. No c/o abdominal pain, reports one non bloody BM this am. Tolerating a diet.          OBJECTIVE  Scheduled Meds:   levoFLOXacin  250 mg Oral Daily    diclofenac sodium  2 g Topical BID    insulin detemir  10 Units Subcutaneous BID    methylPREDNISolone  40 mg Intravenous Q8H    albuterol  2.5 mg Nebulization 4x daily    sodium chloride flush  10 mL Intravenous 2 times per day    furosemide  40 mg Oral BID    montelukast  10 mg Oral Nightly    insulin lispro  0-12 Units Subcutaneous TID WC    insulin lispro  0-6 Units Subcutaneous Nightly    sodium chloride flush  5-40 mL Intravenous Q12H    atorvastatin  40 mg Oral Nightly    budesonide-formoterol  1 puff Inhalation BID    Vitamin D  2,000 Units Oral Dinner    ferrous sulfate  325 mg Oral Dinner    fluticasone  1 spray Nasal Daily    hydrOXYzine  25 mg Oral BID    magnesium oxide  400 mg Oral BID    mirtazapine  15 mg Oral Nightly    pantoprazole  40 mg Oral QAM AC    tiotropium  2 puff Inhalation Daily    tiZANidine  2 mg Oral BID    buPROPion  150 mg Oral QAM    DULoxetine  90 mg Oral Daily    nortriptyline  10 mg Oral Nightly    cetirizine  10 mg Oral Daily    [Held by provider] alogliptin  12.5 mg Oral Daily       Vital Signs:  /63   Pulse 75   Temp 97.5 °F (36.4 °C) (Oral)   Resp 22   Ht 4' 7\" (1.397 m)   Wt 199 lb 4.7 oz (90.4 kg)   SpO2 99%   BMI 46.32 kg/m²      Physical Exam:     General Appearance: alert and oriented to person, place and time, well-developed and well-nourished, in no acute distress  Skin: warm and dry, no rash or erythema  Head: normocephalic and atraumatic  Eyes: pupils equal, round, and reactive to light, extraocular eye movements intact, conjunctivae normal  ENT: hearing grossly normal bilaterally  Neck: neck supple and non tender without mass, no thyromegaly or thyroid nodules, no cervical lymphadenopathy   Pulmonary/Chest: clear to auscultation bilaterally- no wheezes, rales or rhonchi, normal air movement, no respiratory distress  Cardiovascular: normal rate, regular rhythm, normal S1 and S2, no murmurs, rubs, clicks or gallops, distal pulses intact, no carotid bruits  Abdomen: soft, obese non-tender, non-distended, normal bowel sounds, no masses or organomegaly  Extremities: no cyanosis, clubbing or edema  Musculoskeletal: normal range of motion, no joint swelling, deformity or tenderness  Neurologic: no cranial nerve deficit and muscle strength normal    Lab and Imaging Review     CBC  Recent Labs     06/04/21  1541 06/05/21  0039 06/05/21  0738   HGB 7.9* 7.9* 8.2*   HCT 24.4* 25.1* 26.0*       BMP  Recent Labs     06/03/21  0520      K 3.7   *   CO2 25   BUN 25*   CREATININE 1.27*   GLUCOSE 115*   CALCIUM 6.5*   colonoscopy dr Javan Womack 5/26/21  FINDINGS: Rectal examination demonstrated no significant visible external abnormality and digital palpation was unremarkable. Following adequate conscious sedation the colonoscope was introduced and advanced under direct visualization to the ascending colon. The bowel preparation was felt to be fair. This included moderate amounts of bloody, thick stool that was not able to be adequately irrigated and aspirated. Cecal intubation time was 4 minutes.      Once maximally inserted, the endoscope was withdrawn and the mucosa was carefully inspected. The mucosal exam showed mild diffuse colitis throughout the colon.  Mucosal oozing was noted in multiple areas.  No discrete mass.  Prep was fair thus evaluation was limited.      FINDINGS: egd dr Javan Womack 5/26/21  Esophagus:  The esophagus was inspected to the Z-line. The endoscopic exam showed no pathology.      Stomach: The stomach was inspected in both forward and retroflex fashion and was appropriately distensible. The cardia, fundus, incisura, antrum and pylorus were identified via direct visualization. The endoscopic exam showed mild erythema in distal stomach.  No bleeding.      Duodenum: The proximal small bowel was inspected through the bulb, sweep, and second portion of the duodenum. The endoscopic exam showed no pathology.         Findings:egd dr Gene Guzman 6/1/21     Retropharyngeal area was grossly normal appearing     Esophagus: abnormal: EVIDENCE OF LINEAR MILD EROSIVE GASTRITIS BIOPSIES WERE NOT TAKEN AT THIS TIME  NO STIGMATA OF BLEEDING     Stomach:    Fundus: normal    Body: abnormal: MILD EROSIVE GASTRITIS    Antrum: abnormal: AS ABOVE     Duodenum:     Descending: normal    Bulb: normal    Findings: colonoscopy dr Camilo Beal 6/1/21  DARK BLACK VERY STICKY PASTY STOOLS   VISUALIZATION WAS NOT VERY GOOD   NO ACTIVE STIGMATA WAS NOTED         The colon was decompressed and the scope was removed. The patient tolerated the procedure well.      ASSESSMENT/plan  1. Colitis s/p egd and colonoscopy; currently denies abdominal pain and no further rectal bleeding hgb at 8.2.  -d/w md ok to restart [facility]  -recommend stopping the IV steroid and starting oral prednisone: 50mg daily for 7 days then 40mg daily for 7 days then 30mg daily for 7 days then 20 mg for 7 days then 10 mg daily for 7 days  -would like to start Colazal but pt is allergic to aspirin and NSAIDS (rash)  -recommend outpt ENtivyo  -she will need a 1-2 week follow up with dr Meryl Quiroz once discharged    GI signing off please reconsult if needed          This plan was formulated in collaboration with Dr. Delisa Melchor .     Electronically signed by: LONDON Poole CNP on 6/5/2021 at 9:52 AM     Attending Physician Statement  I have discussed the care of Jarad Solis and   I have examined the patient myselft independently, and taken ros and hpi , including pertinent history and exam findings,  with the author of this note . I have reviewed the key elements of all parts of the encounter with the nurse practitioner/resident.     I agree with the assessment, plan and orders as documented by the above health care provider     No sign of bleeding  No abdominal pain no diarrhea tolerating diet  We will switch to p.o. prednisone  We will add colazal  Patient would need biologicals versus antiangiogram versus antiinterleukin  My suggestion would be for [facility]    Electronically signed by Faye Tarango MD

## 2021-06-05 NOTE — CARE COORDINATION
Patient is confirmed for 3:30pm dc via [facility] to [employer]. ELIZABETH to be faxed. Patient, RN and Sam.

## 2021-06-05 NOTE — PROGRESS NOTES
[facility]  Office: [address], DO, Corry Ozuna, DO, Eileen Cedillo, DO, Boni Ortiz Blood, DO, Brett Estrada MD, Talha Quiros MD, Sukhdev Leggett MD, Marianne Martin MD, Louise Valdivia MD, Jevon Garcia MD, Gracy Milain MD, Kaitlin Corado MD, Shefali Ronquillo DO, Garnt Chacon MD, Jordan Mckenzie DO, Radha Mccain MD,  Endy Adams DO, Scott Pearl MD, Leticia Bowers MD, Husam Felder MD, Gisela Roque MD, Mattie Dennis, [facility], [facility], CNP, Garfield Childers, CNP, Yazmin Perez, CNS, Velvet Bowling, CNP, Chantale Zhao, CNP, Arminda Crane, CNP, Keely Ward, CNP, Leesa Ford, CNP, Charles Dave PA-C, Lorenza Ho, [facility], Jayne Neely, CNP, Elisabeth Serrato, CNP, Asia Juárez, CNP, Nicoletta Kocher, CNP, Oswaldo Gore, CNP, Radha Siemens, [facility]    Progress Note    6/5/2021    12:55 PM    Name:   Sixto Herbert  MRN:     [id number]     [place]:      [de-identified]   Room:   2006/2006-02   Day:  11  Admit Date:  5/25/2021 12:33 PM    PCP:   Andrea Owen DO  Code Status:  Full Code    Subjective:     C/C:   Chief Complaint   Patient presents with    GI Bleeding     Interval History Status: improved. Feels ok today and has had no bleeding for>24h now    Sob improved    6/1 egd showed gastritis and colonoscopy showed black, pasty, tarry stool    Tolerating reg diet    Brief History:     Per the NP:  \"5/25 -patient has a history of A. fib on Eliquis.  Over the past 3 days patient has noted bright red blood per rectum.  Patient reports that during that same timeLeonor Hawkins has began experiencing worsening fatigue and shortness of breath.  Patient is now to the point where she has severe shortness of breath with minimal exertion and severe dizziness and weakness while rising.  Patient continues to experience dark red stools mixed with bright red blood and liquid diarrhea.  CT of the abdomen shows lispro  0-12 Units Subcutaneous TID WC    insulin lispro  0-6 Units Subcutaneous Nightly    sodium chloride flush  5-40 mL Intravenous Q12H    atorvastatin  40 mg Oral Nightly    budesonide-formoterol  1 puff Inhalation BID    Vitamin D  2,000 Units Oral Dinner    ferrous sulfate  325 mg Oral Dinner    fluticasone  1 spray Nasal Daily    hydrOXYzine  25 mg Oral BID    magnesium oxide  400 mg Oral BID    mirtazapine  15 mg Oral Nightly    pantoprazole  40 mg Oral QAM AC    tiotropium  2 puff Inhalation Daily    tiZANidine  2 mg Oral BID    buPROPion  150 mg Oral QAM    DULoxetine  90 mg Oral Daily    nortriptyline  10 mg Oral Nightly    cetirizine  10 mg Oral Daily    [Held by provider] alogliptin  12.5 mg Oral Daily     Continuous Infusions:    sodium chloride      dextrose      sodium chloride       PRN Meds: loperamide, oxyCODONE-acetaminophen, oxyCODONE-acetaminophen, sodium chloride, guaiFENesin-dextromethorphan, albuterol, sodium chloride flush, glucose, dextrose, glucagon (rDNA), dextrose, sodium chloride flush, sodium chloride, promethazine **OR** ondansetron, acetaminophen **OR** acetaminophen, albuterol sulfate HFA, LORazepam    Data:     Past Medical History:   has a past medical history of ASHLEIGH (acute kidney injury) ([facility] Arthritis, Asthma, Bipolar 1 disorder ([facility] [address] [facility] CHF (congestive heart failure) ([facility] [address] COPD [facility]chronic obstructive [facility] disease) [facility] Depression, Diabetes mellitus ([facility] Fibromyalgia, GERD (gastroesophageal reflux disease), Hyperlipidemia, Hypertension, and Pneumonia. Social History:   reports that she has been smoking. She has a 40.00 pack-year smoking history. She has never used smokeless tobacco. She reports that she does not drink alcohol and does not use drugs.      Family History:   Family History   Problem Relation Age of Onset    Heart Failure Mother     Cancer Sister     Cancer Maternal Grandmother     Heart Failure Maternal Grandmother Vitals:  BP (!) 139/47   Pulse 78   Temp 98.4 °F (36.9 °C) (Oral)   Resp 18   Ht 4' 7\" (1.397 m)   Wt 199 lb 4.7 oz (90.4 kg)   SpO2 98%   BMI 46.32 kg/m²   Temp (24hrs), Av.9 °F (36.6 °C), Min:97.5 °F (36.4 °C), Max:98.4 °F (36.9 °C)    Recent Labs     21  1627 21  0637 21  1138   POCGLU 251* 278* 170* 261*       I/O (24Hr): Intake/Output Summary (Last 24 hours) at 2021 1255  Last data filed at 2021 2234  Gross per 24 hour   Intake --   Output 750 ml   Net -750 ml       Labs:  Hematology:  Recent Labs     21  1541 21  0039 21  0738   HGB 7.9* 7.9* 8.2*   HCT 24.4* 25.1* 26.0*     Chemistry:  Recent Labs     21  0520      K 3.7   *   CO2 25   GLUCOSE 115*   BUN 25*   CREATININE 1.27*   ANIONGAP 9   LABGLOM 41*   GFRAA 50*   CALCIUM 6.5*     Recent Labs     21  0603 21  1119 21  1627 21  0637 21  1138   POCGLU 82 204* 251* 278* 170* 261*     ABG:  Lab Results   Component Value Date    POCPH 7.26 2016    POCPCO2 56 2016    POCPO2 80 2016    POCHCO3 25.3 2016    NBEA 2 2016    PBEA NOT REPORTED 2016    ZFR7BXT 27 2016    BJEM9LOO 93 2016    FIO2 NOT REPORTED 2016         Radiology:  CT ABDOMEN PELVIS W IV CONTRAST Additional Contrast? None    Result Date: 2021  Acute nonspecific pancolitis. Infectious or inflammatory colitis can be considered, less likely ischemic since the major visceral arteries appear patent. XR CHEST PORTABLE    Result Date: 2021  Subtle bibasilar infiltrates representing atelectasis versus pneumonia. XR CHEST 1 VIEW    Result Date: 2021  Minimal left basilar atelectasis. Otherwise, clear lungs. Cardiomegaly.      IR INSERT PICC VAD W SQ PORT >5 YEARS    Result Date: 2021  Successful ultrasound and fluoroscopy guided right basilic vein 5 Danish power injectable dual-lumen PICC placement. Ready for use. Physical Examination:        General appearance:  alert, cooperative and no distress  Mental Status:  oriented to person, place and time and normal affect  Lungs:  clear bilaterally, normal effort  Heart:  regular rate and rhythm, no murmur  Abdomen:  soft, nontender, nondistended, normal bowel sounds, no masses, hepatomegaly, splenomegaly; obese  Extremities:  no edema, redness, tenderness in the calves  Skin:  no gross lesions, rashes, induration    Assessment:        Hospital Problems         Last Modified POA    * (Principal) GI (gastrointestinal bleed) 5/25/2021 Yes    Chronic obstructive pulmonary disease with acute exacerbation ([address] 5/25/2021 Yes    ASHLEIGH (acute kidney injury) ([address] 5/25/2021 Yes    Type 2 diabetes mellitus without complication, without long-term current use of insulin ([address] 5/25/2021 Yes    Bipolar 1 disorder ([address] 5/25/2021 Yes    BRBPR (bright red blood per rectum) 5/25/2021 Yes    Acute colitis 5/25/2021 Yes    Anemia due to blood loss, acute 5/25/2021 Yes    Chronic diastolic congestive heart failure ([address] 6/1/2021 Yes    Pancolitis ([address] 5/26/2021 Yes    Morbid obesity with BMI of 40.0-44.9, adult ([address] 6/1/2021 Yes    Chronic idiopathic pain syndrome 6/5/2021 Yes          Plan:         Tolerating reg diet  Bleeding appears to have stopped; has had 16u prbc and 2u ffp  Ok to resume anticoagulation per GI: will restart tomorrow  On steroids per GI-switch to po  Dc to snf    [address] Blood, DO  6/5/2021  12:55 PM

## 2024-10-18 ENCOUNTER — HOSPITAL ENCOUNTER (OUTPATIENT)
Age: 79
Setting detail: SPECIMEN
Discharge: HOME | End: 2024-10-18

## 2024-10-18 LAB
ANION GAP SERPL CALCULATED.3IONS-SCNC: 14 MMOL/L (ref 9–16)
BUN SERPL-MCNC: 33 MG/DL (ref 8–23)
CALCIUM SERPL-MCNC: 9.6 MG/DL (ref 8.6–10.4)
CHLORIDE SERPL-SCNC: 99 MMOL/L (ref 98–107)
CO2 SERPL-SCNC: 25 MMOL/L (ref 20–31)
CREAT SERPL-MCNC: 1.7 MG/DL (ref 0.5–0.9)
ERYTHROCYTE [DISTWIDTH] IN BLOOD BY AUTOMATED COUNT: 13.9 % (ref 11.8–14.4)
GFR, ESTIMATED: 31 ML/MIN/1.73M2
GLUCOSE SERPL-MCNC: 293 MG/DL (ref 74–99)
HCT VFR BLD AUTO: 36.1 % (ref 36.3–47.1)
HGB BLD-MCNC: 11.1 G/DL (ref 11.9–15.1)
MCH RBC QN AUTO: 29.6 PG (ref 25.2–33.5)
MCHC RBC AUTO-ENTMCNC: 30.7 G/DL (ref 28.4–34.8)
MCV RBC AUTO: 96.3 FL (ref 82.6–102.9)
NRBC BLD-RTO: 0 PER 100 WBC
PLATELET # BLD AUTO: 343 K/UL (ref 138–453)
PMV BLD AUTO: 11.3 FL (ref 8.1–13.5)
POTASSIUM SERPL-SCNC: 4.3 MMOL/L (ref 3.7–5.3)
RBC # BLD AUTO: 3.75 M/UL (ref 3.95–5.11)
SODIUM SERPL-SCNC: 138 MMOL/L (ref 136–145)
WBC OTHER # BLD: 10.1 K/UL (ref 3.5–11.3)

## 2024-10-25 ENCOUNTER — HOSPITAL ENCOUNTER (OUTPATIENT)
Age: 79
Setting detail: SPECIMEN
Discharge: HOME | End: 2024-10-25

## 2024-10-25 LAB
ANION GAP SERPL CALCULATED.3IONS-SCNC: 14 MMOL/L (ref 9–16)
BUN SERPL-MCNC: 57 MG/DL (ref 8–23)
CALCIUM SERPL-MCNC: 9.4 MG/DL (ref 8.6–10.4)
CHLORIDE SERPL-SCNC: 99 MMOL/L (ref 98–107)
CO2 SERPL-SCNC: 25 MMOL/L (ref 20–31)
CREAT SERPL-MCNC: 1.8 MG/DL (ref 0.5–0.9)
ERYTHROCYTE [DISTWIDTH] IN BLOOD BY AUTOMATED COUNT: 13.6 % (ref 11.8–14.4)
GFR, ESTIMATED: 28 ML/MIN/1.73M2
GLUCOSE SERPL-MCNC: 157 MG/DL (ref 74–99)
HCT VFR BLD AUTO: 37.5 % (ref 36.3–47.1)
HGB BLD-MCNC: 11.9 G/DL (ref 11.9–15.1)
MCH RBC QN AUTO: 30.1 PG (ref 25.2–33.5)
MCHC RBC AUTO-ENTMCNC: 31.7 G/DL (ref 28.4–34.8)
MCV RBC AUTO: 94.7 FL (ref 82.6–102.9)
NRBC BLD-RTO: 0 PER 100 WBC
PLATELET # BLD AUTO: 231 K/UL (ref 138–453)
PMV BLD AUTO: 11.7 FL (ref 8.1–13.5)
POTASSIUM SERPL-SCNC: 4.3 MMOL/L (ref 3.7–5.3)
RBC # BLD AUTO: 3.96 M/UL (ref 3.95–5.11)
SODIUM SERPL-SCNC: 138 MMOL/L (ref 136–145)
WBC OTHER # BLD: 12.1 K/UL (ref 3.5–11.3)

## 2024-12-05 ENCOUNTER — HOSPITAL ENCOUNTER (OUTPATIENT)
Age: 79
Setting detail: SPECIMEN
Discharge: HOME | End: 2024-12-05

## 2024-12-05 LAB
ANION GAP SERPL CALCULATED.3IONS-SCNC: 12 MMOL/L (ref 9–16)
BILIRUB UR QL STRIP: NEGATIVE
BUN SERPL-MCNC: 45 MG/DL (ref 8–23)
CALCIUM SERPL-MCNC: 8.8 MG/DL (ref 8.6–10.4)
CHLORIDE SERPL-SCNC: 99 MMOL/L (ref 98–107)
CLARITY UR: CLEAR
CO2 SERPL-SCNC: 26 MMOL/L (ref 20–31)
COLOR UR: YELLOW
COMMENT: ABNORMAL
CREAT SERPL-MCNC: 1.8 MG/DL (ref 0.6–0.9)
ERYTHROCYTE [DISTWIDTH] IN BLOOD BY AUTOMATED COUNT: 14.2 % (ref 11.8–14.4)
GFR, ESTIMATED: 28 ML/MIN/1.73M2
GLUCOSE SERPL-MCNC: 197 MG/DL (ref 74–99)
GLUCOSE UR STRIP-MCNC: ABNORMAL MG/DL
HCT VFR BLD AUTO: 35.1 % (ref 36.3–47.1)
HGB BLD-MCNC: 10.3 G/DL (ref 11.9–15.1)
HGB UR QL STRIP.AUTO: NEGATIVE
KETONES UR STRIP-MCNC: NEGATIVE MG/DL
LEUKOCYTE ESTERASE UR QL STRIP: NEGATIVE
MCH RBC QN AUTO: 29.3 PG (ref 25.2–33.5)
MCHC RBC AUTO-ENTMCNC: 29.3 G/DL (ref 28.4–34.8)
MCV RBC AUTO: 99.7 FL (ref 82.6–102.9)
NITRITE UR QL STRIP: NEGATIVE
NRBC BLD-RTO: 0 PER 100 WBC
PH UR STRIP: 5 [PH] (ref 5–8)
PLATELET # BLD AUTO: 325 K/UL (ref 138–453)
PMV BLD AUTO: 10.8 FL (ref 8.1–13.5)
POTASSIUM SERPL-SCNC: 4.8 MMOL/L (ref 3.7–5.3)
PROT UR STRIP-MCNC: NEGATIVE MG/DL
RBC # BLD AUTO: 3.52 M/UL (ref 3.95–5.11)
SODIUM SERPL-SCNC: 137 MMOL/L (ref 136–145)
SP GR UR STRIP: 1.01 (ref 1–1.03)
UROBILINOGEN UR STRIP-ACNC: NORMAL EU/DL (ref 0–1)
WBC OTHER # BLD: 12 K/UL (ref 3.5–11.3)

## 2024-12-06 LAB
AMPHET UR QL SCN: NEGATIVE
BARBITURATES UR QL SCN: NEGATIVE
BENZODIAZ UR QL: NEGATIVE
CANNABINOIDS UR QL SCN: NEGATIVE
COCAINE UR QL SCN: NEGATIVE
FENTANYL UR QL: NEGATIVE
METHADONE UR QL: NEGATIVE
MICROORGANISM SPEC CULT: NORMAL
OPIATES UR QL SCN: NEGATIVE
OXYCODONE UR QL SCN: POSITIVE
PCP UR QL SCN: NEGATIVE
SPECIMEN DESCRIPTION: NORMAL
TEST INFORMATION: ABNORMAL

## 2025-01-16 ENCOUNTER — TELEPHONE (OUTPATIENT)
Dept: ONCOLOGY | Age: 80
End: 2025-01-16

## 2025-01-17 ENCOUNTER — HOSPITAL ENCOUNTER (OUTPATIENT)
Age: 80
Setting detail: SPECIMEN
Discharge: HOME | End: 2025-01-17

## 2025-01-17 LAB
ANION GAP SERPL CALCULATED.3IONS-SCNC: 12 MMOL/L (ref 9–16)
BUN SERPL-MCNC: 58 MG/DL (ref 8–23)
CALCIUM SERPL-MCNC: 9.1 MG/DL (ref 8.6–10.4)
CHLORIDE SERPL-SCNC: 99 MMOL/L (ref 98–107)
CO2 SERPL-SCNC: 27 MMOL/L (ref 20–31)
CREAT SERPL-MCNC: 1.7 MG/DL (ref 0.6–0.9)
ERYTHROCYTE [DISTWIDTH] IN BLOOD BY AUTOMATED COUNT: 14.7 % (ref 11.8–14.4)
GFR, ESTIMATED: 31 ML/MIN/1.73M2
GLUCOSE SERPL-MCNC: 201 MG/DL (ref 74–99)
HCT VFR BLD AUTO: 40.1 % (ref 36.3–47.1)
HGB BLD-MCNC: 11.6 G/DL (ref 11.9–15.1)
MCH RBC QN AUTO: 29.6 PG (ref 25.2–33.5)
MCHC RBC AUTO-ENTMCNC: 28.9 G/DL (ref 28.4–34.8)
MCV RBC AUTO: 102.3 FL (ref 82.6–102.9)
NRBC BLD-RTO: 0 PER 100 WBC
PLATELET # BLD AUTO: 218 K/UL (ref 138–453)
PMV BLD AUTO: 11 FL (ref 8.1–13.5)
POTASSIUM SERPL-SCNC: 4.3 MMOL/L (ref 3.7–5.3)
RBC # BLD AUTO: 3.92 M/UL (ref 3.95–5.11)
SODIUM SERPL-SCNC: 138 MMOL/L (ref 136–145)
WBC OTHER # BLD: 11.9 K/UL (ref 3.5–11.3)

## 2025-01-27 ENCOUNTER — TELEPHONE (OUTPATIENT)
Dept: ONCOLOGY | Age: 80
End: 2025-01-27

## 2025-01-27 ENCOUNTER — INITIAL CONSULT (OUTPATIENT)
Dept: ONCOLOGY | Age: 80
End: 2025-01-27
Payer: COMMERCIAL

## 2025-01-27 VITALS
RESPIRATION RATE: 16 BRPM | TEMPERATURE: 97.6 F | HEART RATE: 67 BPM | SYSTOLIC BLOOD PRESSURE: 139 MMHG | DIASTOLIC BLOOD PRESSURE: 74 MMHG

## 2025-01-27 DIAGNOSIS — F17.200 SMOKER: ICD-10-CM

## 2025-01-27 DIAGNOSIS — R91.8 LUNG MASS: ICD-10-CM

## 2025-01-27 DIAGNOSIS — J44.1 CHRONIC OBSTRUCTIVE PULMONARY DISEASE WITH ACUTE EXACERBATION (HCC): Primary | ICD-10-CM

## 2025-01-27 PROCEDURE — 99202 OFFICE O/P NEW SF 15 MIN: CPT | Performed by: INTERNAL MEDICINE

## 2025-01-27 PROCEDURE — 3075F SYST BP GE 130 - 139MM HG: CPT | Performed by: INTERNAL MEDICINE

## 2025-01-27 PROCEDURE — 99205 OFFICE O/P NEW HI 60 MIN: CPT | Performed by: INTERNAL MEDICINE

## 2025-01-27 PROCEDURE — 3078F DIAST BP <80 MM HG: CPT | Performed by: INTERNAL MEDICINE

## 2025-01-27 RX ORDER — SPIRONOLACTONE 25 MG/1
25 TABLET ORAL DAILY
COMMUNITY
Start: 2024-10-05

## 2025-01-27 RX ORDER — CLOBETASOL PROPIONATE 0.05 G/100ML
SHAMPOO TOPICAL
COMMUNITY

## 2025-01-27 RX ORDER — FENTANYL 50 UG/1
PATCH TRANSDERMAL
COMMUNITY
Start: 2025-01-22

## 2025-01-27 RX ORDER — PREDNISONE 10 MG/1
10 TABLET ORAL DAILY
COMMUNITY
Start: 2024-07-31

## 2025-01-27 RX ORDER — RIZATRIPTAN BENZOATE 5 MG/1
TABLET ORAL
COMMUNITY

## 2025-01-27 RX ORDER — BUMETANIDE 2 MG/1
1 TABLET ORAL DAILY
COMMUNITY

## 2025-01-27 RX ORDER — LORAZEPAM 0.5 MG/1
TABLET ORAL
COMMUNITY

## 2025-01-27 RX ORDER — POTASSIUM CHLORIDE 1500 MG/1
20 TABLET, EXTENDED RELEASE ORAL EVERY OTHER DAY
COMMUNITY
Start: 2024-07-25

## 2025-01-27 RX ORDER — FLUTICASONE FUROATE AND VILANTEROL TRIFENATATE 200; 25 UG/1; UG/1
POWDER RESPIRATORY (INHALATION)
COMMUNITY
Start: 2025-01-16

## 2025-01-27 RX ORDER — RANOLAZINE 500 MG/1
500 TABLET, EXTENDED RELEASE ORAL EVERY 12 HOURS SCHEDULED
COMMUNITY
Start: 2024-10-04

## 2025-01-27 NOTE — TELEPHONE ENCOUNTER
ALIE HERE FOR CONSULT   Refer to radiation oncology, return in 5 weeks   RAD ONC: 1/30/25 @ 2PM   MD VISIT: 3/3/25 @ 12PM   AVS PRINTED AND GIVEN ON EXIT

## 2025-01-27 NOTE — TELEPHONE ENCOUNTER
Name: Yvette Andrews  : 1945  MRN: [id number]    Oncology Navigation- Initial Note:    Intake-  Contact Type: Telephone    Continuum of Care: Diagnosis/Active Treatment    Smoking hx: Former Smoker    Notes: Navigator met with pt. Face to face along with daughters. Folder given to pt. With available resources. No tissue diagnosis , but pt. Referring to RO for SBRT. Pt. Not wanting to attempt Bx too high risk. Writer will continue to follow.     Electronically signed by Mecca Maddox RN on 2025 at 1:30 PM

## 2025-01-27 NOTE — PROGRESS NOTES
DIAGNOSIS:   Left lower lobe lung mass.  Highly suspicious for malignancy  Multiple comorbidities including COPD, CKD, multiple hospitalization and borderline performance status.  CURRENT THERAPY:  Consider treatment option  BRIEF CASE HISTORY:   Yvette Andrews is a very pleasant 79 y.o. female who is referred to us for management recommendation regarding her lung mass.  Patient was admitted to the hospital with COPD exacerbation, ASHLEIGH and septic shock.  During hospitalization, CT scan was done and showed to distinct lung masses.  The first 1 was near the left hilum measuring 2.6 cm and the other 1 was in the superior segment of the l left lower lobe measuring 1.3 cm.  PET CT scan showed avidity in both lesions but no other hilar, mediastinal or systemic metastases.  Patient was seen by pulmonary service.  IR evaluated her for biopsy and deemed to be too high of a risk.  She was offered bronchoscopy and transbronchial biopsy with the patient and family were very hesitant and they asked for oncology opinion..   She is sent to us for a consultation, she is in a wheelchair and on longstanding oxygen.  Estimated performance status by ECOG 2.  In fact, she was on hospice service because of severe COPD but condition stabilized and she was transitioned to palliative care.  The family are considering doing anything at all but they are adamant against any procedure or surgery    PAST MEDICAL HISTORY: has a past medical history of ASHLEIGH (acute kidney injury) (HCC), Arthritis, Asthma, Bipolar 1 disorder (HCC), CHF (congestive heart failure) (HCC), COPD (chronic obstructive pulmonary disease) (HCC), Depression, Diabetes mellitus (HCC), Fibromyalgia, GERD (gastroesophageal reflux disease), Hyperlipidemia, Hypertension, and Pneumonia.    PAST SURGICAL HISTORY: has a past surgical history that includes Cholecystectomy; Hysterectomy; Tonsillectomy; Knee Arthroplasty (Left); Foot fracture surgery (Left); pr egd transoral biopsy

## 2025-01-30 ENCOUNTER — HOSPITAL ENCOUNTER (OUTPATIENT)
Dept: RADIATION ONCOLOGY | Age: 80
Discharge: HOME | End: 2025-01-30
Payer: COMMERCIAL

## 2025-01-30 VITALS
TEMPERATURE: 98 F | HEART RATE: 65 BPM | BODY MASS INDEX: 39.09 KG/M2 | RESPIRATION RATE: 18 BRPM | DIASTOLIC BLOOD PRESSURE: 70 MMHG | WEIGHT: 168.2 LBS | OXYGEN SATURATION: 100 % | SYSTOLIC BLOOD PRESSURE: 118 MMHG

## 2025-01-30 PROCEDURE — 99213 OFFICE O/P EST LOW 20 MIN: CPT | Performed by: RADIOLOGY

## 2025-01-30 ASSESSMENT — PATIENT HEALTH QUESTIONNAIRE - PHQ9
SUM OF ALL RESPONSES TO PHQ QUESTIONS 1-9: 0
2. FEELING DOWN, DEPRESSED OR HOPELESS: NOT AT ALL
1. LITTLE INTEREST OR PLEASURE IN DOING THINGS: NOT AT ALL
SUM OF ALL RESPONSES TO PHQ QUESTIONS 1-9: 0
SUM OF ALL RESPONSES TO PHQ9 QUESTIONS 1 & 2: 0

## 2025-01-30 NOTE — CONSULTS
[facility]            Radiation Oncology          [address] [place] [address]        O: [phone]        F: [phone]       [facility]             2025    Patient: Yvette Andrews   YOB: 1945       Dear Dr Delgado:    Thank you for referring Yvette Andrews to me for evaluation. Below are the relevant portions of my assessment and plan of care. If you have questions, please do not hesitate to call me. I look forward to following this patient along with you.     Sincerely,    Electronically signed by Brady Boyce MD on 2025 at 2:40 PM    CC: Patient Care Team:  Bimal Chen DO as PCP - General  Maddox, Mecca, RN as Nurse Navigator (Oncology)  ------------------------------------------------------------------------------------------------------------------------------------------------------------------------------------------      RADIATION ONCOLOGY NEW PATIENT VISIT:    Date of Service: 2025    Location:  [facility] Radiation Oncology,   [address] [place] [address], [place] [id number] [phone]     Patient ID:   Yvette Andrews  : 1945   MRN: [id number]    CHIEF COMPLAINT: \"I am here to discuss radiation\"    DIAGNOSIS:  Presumed diagnosis of left lung carcinoma T3 N0 M0    HISTORY OF PRESENT ILLNESS:   Yvette Andrews is a 79 y.o. female with history of COPD, tobacco usage, on O2 supplement, was noted to have left hilar lesion in 2020 for a CT chest.  Repeat CT chest was done in 2024 and shows enlargement of the left hilar lesion suspicious for neoplastic disease.  Patient had an FDG PET scan which showed FDG avid lesion in the left lower lobe suspicious for neoplasm without any other evidence of local regional or distant metastatic disease.  Patient was seen by pulmonary and medical oncology, and due to her comorbidities she is not a candidate for a biopsy of her surgery.

## 2025-01-30 NOTE — PROGRESS NOTES
not have stable housing that you own, rent or stay in as a part of a household?: No             FALLS RISK SCREEN  Instructions:  Assess the patient and enter the appropriate indicators that are present for fall risk identification.   Total the numbers entered and assign a fall risk score from Table 2.  Reassess patient at a minimum every 12 weeks or with status change.    Assessment   Date  1/30/2025     1.  Mental Ability: confusion/cognitively impaired 0     2.  Elimination Issues: incontinence, frequency 0       3.  Ambulatory: use of assistive devices (walker, cane, off-loading devices),        attached to equipment (IV pole, oxygen) 2     4.  Sensory Limitations: dizziness, vertigo, impaired vision 0     5.  Age less than 65        0     6.  Age 65 or greater 1     7.  Medication: diuretics, strong analgesics, hypnotics, sedatives,        antihypertensive agents 3   8.  Falls:  recent history of falls within the last 3 months (not to include slipping or        tripping) 0   TOTAL 6    If score of 4 or greater was education given? Yes       TABLE 2   Risk Score Risk Level Plan of Care   0-3 Little or  No Risk 1.  Provide assistance as indicated for ambulation activities  2.  Reorient confused/cognitively impaired patient  3.  Call-light/bell within patient's reach  4.  Chair/bed in low position, stretcher/bed with siderails up except when performing patient care activities  5.  Educate patient/family/caregiver on falls prevention  6.  Reassess in 12 weeks or with any noted change in patient condition which places them at a risk for a fall   4-6 Moderate Risk 1.  Provide assistance as indicated for ambulation activities  2.  Reorient confused/cognitively impaired patient  3.  Call-light/bell within patient's reach  4.  Chair/bed in low position, stretcher/bed with siderails up except when performing patient care activities  5.  Educate patient/family/caregiver on falls prevention     7 or   Higher High Risk 1.

## 2025-01-30 NOTE — ACP (ADVANCE CARE PLANNING)
Advance Care Planning     Hospice Services: Patient is not currently receiving hospice services/has not received hospice care within the performance year.    Advance Care Planning was discussed with patient, and the patient's Advance Care Plan is as follows: ACP on file.

## 2025-02-13 ENCOUNTER — HOSPITAL ENCOUNTER (OUTPATIENT)
Dept: RADIATION ONCOLOGY | Age: 80
Discharge: HOME | End: 2025-02-13
Payer: COMMERCIAL

## 2025-02-13 PROCEDURE — 77334 RADIATION TREATMENT AID(S): CPT | Performed by: RADIOLOGY

## 2025-02-13 NOTE — DISCHARGE INSTRUCTIONS
straw.  Eat foods that are cool or at room temperature.  Eat small meals and snacks. It may be easier to eat a small amount of food at one tie. Instead of eating 3 large meals each day, eat 5 or 6 small meals and snacks.  Choose foods and drinks that are high in calories and protein. When it hurts to swallow, you may eat less and lose weight. It is important to maintain your wieght during radiation therapy. Having foods and drinks that are high in calories and protein can help you.   Sit upright and bend your head slightly forward when you are eating or drinking. Remain sitting or standing upright for at least 30 minutes after eating  Avoid things that can burn or scrape your throat, such as:  Hot foods and drinks  Spicy foods  Foods and juices that are high in acid, such as tomatoes and oranges  Sharp, crunchy foods, such as potato or corn chips  All tobacco products, such as cigarettes, pipes, cigars, and chewing tobacco  Dinks that contain alcohol  Talk with a dietitian. He or she can help make sure you eat enough to maintain your weight.  Talk with your doctor or nurse. Let you doctor or nurse know if you notice throat changes, such as trouble swallowing, feeling as if you are choking or coughing while eating or drinking. Also, let him or her know if you have throat pain or lose weight. You doctor can prescribe medicines that may help relieve your symptoms, such as antacids, gels that coat your throat, and painkillers.    [facility] [place]

## 2025-02-13 NOTE — PROGRESS NOTES
Radiation Treatment Site/Plan/Fractions:15 daily radiation treatments to left lung (WILEY)      Concurrent Chemotherapy/Immunotherapy:No      Cardiac Device:No      Transportation Concerns:No      Nursing Referrals and Reasons:None      Contrast Given:No          Miscellaneous Information:Patient arrives in wheelchair due to weakness and shortness of breath.  She is wearing home oxygen.  She is accompanied by her daughter.  Treatment plan, radiation therapy treatment process and site specific side effects reviewed. Questions answered to her satisfaction and she voiced understanding of the information.  Patient escorted to simulation room.

## 2025-02-18 ENCOUNTER — HOSPITAL ENCOUNTER (OUTPATIENT)
Dept: RADIATION ONCOLOGY | Age: 80
End: 2025-02-18
Payer: COMMERCIAL

## 2025-02-19 ENCOUNTER — HOSPITAL ENCOUNTER (OUTPATIENT)
Dept: RADIATION ONCOLOGY | Age: 80
Discharge: HOME | End: 2025-02-19
Payer: COMMERCIAL

## 2025-03-03 ENCOUNTER — OFFICE VISIT (OUTPATIENT)
Dept: ONCOLOGY | Age: 80
End: 2025-03-03
Payer: COMMERCIAL

## 2025-03-03 ENCOUNTER — TELEPHONE (OUTPATIENT)
Dept: ONCOLOGY | Age: 80
End: 2025-03-03

## 2025-03-03 VITALS
SYSTOLIC BLOOD PRESSURE: 103 MMHG | BODY MASS INDEX: 33.97 KG/M2 | TEMPERATURE: 97.8 F | HEIGHT: 59 IN | DIASTOLIC BLOOD PRESSURE: 53 MMHG | HEART RATE: 67 BPM

## 2025-03-03 DIAGNOSIS — F17.200 SMOKER: ICD-10-CM

## 2025-03-03 DIAGNOSIS — J44.1 CHRONIC OBSTRUCTIVE PULMONARY DISEASE WITH ACUTE EXACERBATION (HCC): Primary | ICD-10-CM

## 2025-03-03 DIAGNOSIS — R91.8 LUNG MASS: ICD-10-CM

## 2025-03-03 PROCEDURE — 3074F SYST BP LT 130 MM HG: CPT | Performed by: INTERNAL MEDICINE

## 2025-03-03 PROCEDURE — 1159F MED LIST DOCD IN RCRD: CPT | Performed by: INTERNAL MEDICINE

## 2025-03-03 PROCEDURE — 99214 OFFICE O/P EST MOD 30 MIN: CPT | Performed by: INTERNAL MEDICINE

## 2025-03-03 PROCEDURE — 3078F DIAST BP <80 MM HG: CPT | Performed by: INTERNAL MEDICINE

## 2025-03-03 PROCEDURE — 99211 OFF/OP EST MAY X REQ PHY/QHP: CPT | Performed by: INTERNAL MEDICINE

## 2025-03-03 PROCEDURE — 1123F ACP DISCUSS/DSCN MKR DOCD: CPT | Performed by: INTERNAL MEDICINE

## 2025-03-03 NOTE — TELEPHONE ENCOUNTER
ALIE HERE FOR FOLLOW UP   PER DR. ROLAND HIGHTOWER IN LATE APRIL (AFTER RADIATION)   MD VISIT: 4/21/25 @ 12PM   AVS PRINTED AND GIVEN ON EXIT

## 2025-03-03 NOTE — PROGRESS NOTES
cm.   3.  Cardiomegaly and prominent main pulmonary artery.   PET scan   IMPRESSION:   * There are 2 morphologically suspicious FDG avid lesions in the left lower lobe which represent malignancy until proven otherwise. Consider soft tissue sampling for definitive characterization.   *  No definite locoregional lymph node involvement or evidence of distant metastatic disease.   *  Dilatation of the ascending thoracic aorta up to 4.2 cm.   *  Heavy multivessel coronary artery calcification.   *  Correlation clinical exam in regards soft tissues upper posterior back region.   IMPRESSION:   Enlarging lung mass, highly suspicious for malignancy  Severe COPD  Multiple comorbidities and poor performance status  Decision to proceed with SBRT  PLAN:   We called radiation oncology try to expedite SBRT as soon as possible.  Hopefully they will be able to do that in the next few days  The patient also has many questions and concerns about the treatment to and we went over the diagnosis and treatment plan in detail  I contacted radiation oncology personally and inquired about the progress of the plan.  I was informed that the plan is ready and hopefully she will be started in the next few days.  Will see her back after radiation    Vipin Delgado MD  Hematologist/Medical Oncologist  [facility]

## 2025-03-04 ENCOUNTER — HOSPITAL ENCOUNTER (OUTPATIENT)
Dept: RADIATION ONCOLOGY | Age: 80
Discharge: HOME | End: 2025-03-04
Payer: COMMERCIAL

## 2025-03-05 PROCEDURE — 77336 RADIATION PHYSICS CONSULT: CPT | Performed by: RADIOLOGY

## 2025-03-11 ENCOUNTER — HOSPITAL ENCOUNTER (OUTPATIENT)
Dept: RADIATION ONCOLOGY | Age: 80
Discharge: HOME | End: 2025-03-11
Payer: COMMERCIAL

## 2025-03-11 PROCEDURE — 77386 HC NTSTY MODUL RAD TX DLVR CPLX: CPT | Performed by: RADIOLOGY

## 2025-03-12 ENCOUNTER — HOSPITAL ENCOUNTER (OUTPATIENT)
Dept: RADIATION ONCOLOGY | Age: 80
Discharge: HOME | End: 2025-03-12
Payer: COMMERCIAL

## 2025-03-12 VITALS
OXYGEN SATURATION: 98 % | DIASTOLIC BLOOD PRESSURE: 83 MMHG | SYSTOLIC BLOOD PRESSURE: 138 MMHG | WEIGHT: 177.6 LBS | RESPIRATION RATE: 16 BRPM | TEMPERATURE: 98 F | HEART RATE: 61 BPM | BODY MASS INDEX: 35.87 KG/M2

## 2025-03-12 PROCEDURE — 77386 HC NTSTY MODUL RAD TX DLVR CPLX: CPT | Performed by: RADIOLOGY

## 2025-03-12 NOTE — PROGRESS NOTES
Yvette Andrews  3/12/2025  Wt Readings from Last 3 Encounters:   03/12/25 80.6 kg (177 lb 9.6 oz)   01/30/25 76.3 kg (168 lb 3.2 oz)   06/05/21 90.4 kg (199 lb 4.7 oz)     Body mass index is 35.87 kg/m².        Treatment Area:Left Lung    Patient was seen today for weekly visit.      Comfort Alteration  Fatigue: Moderate    Ventilation Alterations  Cough: Yes- occasional  Hemoptysis: No  Mucus Color: none  Dyspnea: Yes- exertional      Nutritional Alteration  Anorexia: No  Nausea: No   Vomiting: No     Mucous Membrane Alteration  Voice Changes/ Stridor/Larynx: no  Pharynx & Esophagus: WNL    Elimination Alterations  Constipation: no  Diarrhea:  no    Skin Alteration   Sensation:intact    Radiation Dermatitis:  Intact [x]     Erythema  []     Discoloration  []     Rash []     Dry desquamation  []     Moist desquamation []       Emotional  Coping: effective      Injury, potential bleeding or infection: none    Lab Results   Component Value Date    WBC 11.9 (H) 01/17/2025    HGB 11.6 (L) 01/17/2025    HCT 40.1 01/17/2025     01/17/2025         /83   Pulse 61   Temp 98 °F (36.7 °C)   Resp 16   Wt 80.6 kg (177 lb 9.6 oz)   SpO2 98%   BMI 35.87 kg/m²      Pain Assessment: None - Denies Pain            Assessment/Plan: Patient was seen today for weekly visit.  She arrives in wheelchair due to generalized weakness.  She is accompanied by her daughter.She is wearing home oxygen at 2L.  She denies treatment related concerns.  She reports having a migraine yesterday for \"the first time in a while\".  She obtained relief from medication and being in a dark/quiet environment. Dr. Boyce evaluated patient.  Continue plan of care.    Mayi Jackson RN

## 2025-03-12 NOTE — PROGRESS NOTES
[facility]       Radiation Oncology          [address] [place] [address]        O: [phone]        F: [phone]       [facility]             RADIATION ONCOLOGY WEEKLY PROGRESS NOTE  Patient ID:   Yvette Andrews  : 1945   MRN: [id number]    Location:  [facility] Radiation Oncology,   [address] [facility], [place] [address]   [phone]    DIAGNOSIS:  Presumed diagnosis of left lung carcinoma T3 N0 M0      TREATMENT DETAILS:  Treatment Site: WILEY lesions (2 lesions)  Actual Dose: 800cGy  Total Planned Dose: 6000cGy  Treatment Technique: IMRT  Fraction Technique: Daily  Therapy imaging monitoring: CBCT daily  Concurrent Chemotherapy: None    SUBJECTIVE:   Patient seen for their weekly on treatment evaluation today.  Doing well, no changes in breathing    OBJECTIVE:     ECO Asymptomatic    VITAL SIGNS: /83   Pulse 61   Temp 98 °F (36.7 °C)   Resp 16   Wt 80.6 kg (177 lb 9.6 oz)   SpO2 98%   BMI 35.87 kg/m²   Wt Readings from Last 5 Encounters:   25 80.6 kg (177 lb 9.6 oz)   25 76.3 kg (168 lb 3.2 oz)   21 90.4 kg (199 lb 4.7 oz)   17 77.2 kg (170 lb 3 oz)   17 79.3 kg (174 lb 13.2 oz)     GENERAL:  General appearance is in no apparent distress.  HEART:  Normal rate and regular rhythm  LUNGS:  Pulmonary effort normal.  On O2 supplements  ABDOMEN:  Soft, nontender, non distended  EXTREMITIES:  No edema.  No calf tenderness.  MSK:  No spinal tenderness. Normal ROM.  NEUROLOGICAL: Alert and oriented. Strength and sensation intact bilaterally. No focal deficits.   PSYCH: Mood normal, behavior normal.      LABS:  WBC   Date Value Ref Range Status   2025 11.9 (H) 3.5 - 11.3 k/uL Final   01/10/2025 14.39 (H) (3.80  - 10.60) x10^3ul Corrected   2024 12.0 (H) 3.5 - 11.3 k/uL Final     Neutrophils Absolute   Date Value Ref Range Status   2021 18.48 (H) 1.50 - 8.10 k/uL Final

## 2025-03-13 ENCOUNTER — HOSPITAL ENCOUNTER (OUTPATIENT)
Dept: RADIATION ONCOLOGY | Age: 80
Discharge: HOME | End: 2025-03-13
Payer: COMMERCIAL

## 2025-03-13 PROCEDURE — 77386 HC NTSTY MODUL RAD TX DLVR CPLX: CPT | Performed by: RADIOLOGY

## 2025-03-13 PROCEDURE — 77336 RADIATION PHYSICS CONSULT: CPT | Performed by: RADIOLOGY

## 2025-03-14 ENCOUNTER — HOSPITAL ENCOUNTER (OUTPATIENT)
Dept: RADIATION ONCOLOGY | Age: 80
Discharge: HOME | End: 2025-03-14
Payer: COMMERCIAL

## 2025-03-14 ENCOUNTER — TELEPHONE (OUTPATIENT)
Dept: ONCOLOGY | Age: 80
End: 2025-03-14

## 2025-03-14 PROCEDURE — 77386 HC NTSTY MODUL RAD TX DLVR CPLX: CPT | Performed by: RADIOLOGY

## 2025-03-14 NOTE — TELEPHONE ENCOUNTER
Name: Yvette Andrews  : 1945  MRN: [id number]    Oncology Navigation Follow-Up Note    Contact Type:  Telephone    Notes: Navigator left VM offering assistance if needed.       Electronically signed by Mecca Maddox RN on 3/14/2025 at 8:25 AM

## 2025-03-17 ENCOUNTER — HOSPITAL ENCOUNTER (OUTPATIENT)
Dept: RADIATION ONCOLOGY | Age: 80
Discharge: HOME | End: 2025-03-17
Payer: COMMERCIAL

## 2025-03-17 PROCEDURE — 77386 HC NTSTY MODUL RAD TX DLVR CPLX: CPT | Performed by: RADIOLOGY

## 2025-03-18 ENCOUNTER — HOSPITAL ENCOUNTER (OUTPATIENT)
Dept: RADIATION ONCOLOGY | Age: 80
Discharge: HOME | End: 2025-03-18
Payer: COMMERCIAL

## 2025-03-18 PROCEDURE — 77386 HC NTSTY MODUL RAD TX DLVR CPLX: CPT | Performed by: RADIOLOGY

## 2025-03-19 ENCOUNTER — HOSPITAL ENCOUNTER (OUTPATIENT)
Dept: RADIATION ONCOLOGY | Age: 80
Discharge: HOME | End: 2025-03-19
Payer: COMMERCIAL

## 2025-03-19 VITALS
TEMPERATURE: 97 F | BODY MASS INDEX: 35.1 KG/M2 | SYSTOLIC BLOOD PRESSURE: 134 MMHG | OXYGEN SATURATION: 98 % | DIASTOLIC BLOOD PRESSURE: 73 MMHG | WEIGHT: 173.8 LBS | RESPIRATION RATE: 16 BRPM

## 2025-03-19 PROCEDURE — 77336 RADIATION PHYSICS CONSULT: CPT | Performed by: RADIOLOGY

## 2025-03-19 PROCEDURE — 77386 HC NTSTY MODUL RAD TX DLVR CPLX: CPT | Performed by: RADIOLOGY

## 2025-03-19 NOTE — PROGRESS NOTES
Yvette Andrews  3/19/2025  Wt Readings from Last 3 Encounters:   03/19/25 78.8 kg (173 lb 12.8 oz)   03/12/25 80.6 kg (177 lb 9.6 oz)   01/30/25 76.3 kg (168 lb 3.2 oz)     Body mass index is 35.1 kg/m².        Treatment Area:Left Lung    Patient was seen today for weekly visit.      Comfort Alteration  Fatigue: Moderate    Ventilation Alteration-Wears Oxygen@2L/NC  Cough: Yes- occasional  Hemoptysis: No  Mucus Color: none  Dyspnea: Yes- exertional      Nutritional Alteration  Anorexia: No  Nausea: No   Vomiting: No     Mucous Membrane Alteration  Voice Changes/ Stridor/Larynx: no  Pharynx & Esophagus: WNL    Elimination Alterations  Constipation: no  Diarrhea:  no    Skin Alteration   Sensation:intact    Radiation Dermatitis:  Intact [x]     Erythema  []     Discoloration  []     Rash []     Dry desquamation  []     Moist desquamation []       Emotional  Coping: effective      Injury, potential bleeding or infection: none    Lab Results   Component Value Date    WBC 11.9 (H) 01/17/2025    HGB 11.6 (L) 01/17/2025    HCT 40.1 01/17/2025     01/17/2025         /73   Temp 97 °F (36.1 °C) (Temporal)   Resp 16   Wt 78.8 kg (173 lb 12.8 oz)   SpO2 98%   BMI 35.10 kg/m²      Pain Assessment: 0-10            Assessment/Plan: Patient was seen today for weekly visit.  She denies worseniong respiratory symptoms or cough.  Generalized weakness and fatigue are ongoing.  Plan of care ongoing.      Raya Inman RN  
and toxicities reviewed with the patient, and appropriate management was advised. Will continue radiation treatment as planned, and recommend patient contact us if they have any questions or concerns.     Continue radiation therapy as planned. Tolerating therapy as expected     Electronically signed by Brady Boyce MD on 3/19/2025 at 1:19 PM      Drugs Prescribed:  New Prescriptions    No medications on file       Other Orders Placed:  No orders of the defined types were placed in this encounter.

## 2025-03-20 ENCOUNTER — HOSPITAL ENCOUNTER (OUTPATIENT)
Dept: RADIATION ONCOLOGY | Age: 80
Discharge: HOME | End: 2025-03-20
Payer: COMMERCIAL

## 2025-03-20 PROCEDURE — 77386 HC NTSTY MODUL RAD TX DLVR CPLX: CPT | Performed by: RADIOLOGY

## 2025-03-21 ENCOUNTER — HOSPITAL ENCOUNTER (OUTPATIENT)
Dept: RADIATION ONCOLOGY | Age: 80
Discharge: HOME | End: 2025-03-21
Payer: COMMERCIAL

## 2025-03-21 PROCEDURE — 77386 HC NTSTY MODUL RAD TX DLVR CPLX: CPT | Performed by: RADIOLOGY

## 2025-03-24 ENCOUNTER — HOSPITAL ENCOUNTER (OUTPATIENT)
Dept: RADIATION ONCOLOGY | Age: 80
Discharge: HOME | End: 2025-03-24
Payer: COMMERCIAL

## 2025-03-24 ENCOUNTER — HOSPITAL ENCOUNTER (OUTPATIENT)
Age: 80
Setting detail: SPECIMEN
Discharge: HOME | End: 2025-03-24

## 2025-03-24 LAB
ANION GAP SERPL CALCULATED.3IONS-SCNC: 13 MMOL/L (ref 9–16)
BUN SERPL-MCNC: 46 MG/DL (ref 8–23)
CALCIUM SERPL-MCNC: 9.2 MG/DL (ref 8.6–10.4)
CHLORIDE SERPL-SCNC: 99 MMOL/L (ref 98–107)
CO2 SERPL-SCNC: 25 MMOL/L (ref 20–31)
CREAT SERPL-MCNC: 1.5 MG/DL (ref 0.6–0.9)
ERYTHROCYTE [DISTWIDTH] IN BLOOD BY AUTOMATED COUNT: 14.7 % (ref 11.8–14.4)
GFR, ESTIMATED: 35 ML/MIN/1.73M2
GLUCOSE SERPL-MCNC: 206 MG/DL (ref 74–99)
HCT VFR BLD AUTO: 38.2 % (ref 36.3–47.1)
HGB BLD-MCNC: 11.1 G/DL (ref 11.9–15.1)
MCH RBC QN AUTO: 29.2 PG (ref 25.2–33.5)
MCHC RBC AUTO-ENTMCNC: 29.1 G/DL (ref 28.4–34.8)
MCV RBC AUTO: 100.5 FL (ref 82.6–102.9)
NRBC BLD-RTO: 0 PER 100 WBC
PLATELET # BLD AUTO: 207 K/UL (ref 138–453)
PMV BLD AUTO: 10.9 FL (ref 8.1–13.5)
POTASSIUM SERPL-SCNC: 4.7 MMOL/L (ref 3.7–5.3)
RBC # BLD AUTO: 3.8 M/UL (ref 3.95–5.11)
SODIUM SERPL-SCNC: 137 MMOL/L (ref 136–145)
WBC OTHER # BLD: 10.6 K/UL (ref 3.5–11.3)

## 2025-03-24 PROCEDURE — 77386 HC NTSTY MODUL RAD TX DLVR CPLX: CPT | Performed by: RADIOLOGY

## 2025-03-25 ENCOUNTER — TELEPHONE (OUTPATIENT)
Dept: ONCOLOGY | Age: 80
End: 2025-03-25

## 2025-03-25 ENCOUNTER — HOSPITAL ENCOUNTER (OUTPATIENT)
Dept: RADIATION ONCOLOGY | Age: 80
Discharge: HOME | End: 2025-03-25
Payer: COMMERCIAL

## 2025-03-25 PROCEDURE — 77386 HC NTSTY MODUL RAD TX DLVR CPLX: CPT | Performed by: RADIOLOGY

## 2025-03-25 NOTE — TELEPHONE ENCOUNTER
Name: Yvette Andrews  : 1945  MRN: [id number]    Oncology Navigation Follow-Up Note    Contact Type:  Telephone    Notes: Navigator calling pt. And spoke with daughter Reva offering assistance. Pt. With worsening cough , but clear production. No barriers to care noted.       Electronically signed by Mecca Maddox RN on 3/25/2025 at 2:26 PM

## 2025-03-26 ENCOUNTER — HOSPITAL ENCOUNTER (OUTPATIENT)
Dept: RADIATION ONCOLOGY | Age: 80
Discharge: HOME | End: 2025-03-26
Payer: COMMERCIAL

## 2025-03-26 VITALS
RESPIRATION RATE: 18 BRPM | WEIGHT: 170.4 LBS | HEART RATE: 85 BPM | TEMPERATURE: 97.8 F | OXYGEN SATURATION: 96 % | BODY MASS INDEX: 34.42 KG/M2

## 2025-03-26 DIAGNOSIS — C34.32 MALIGNANT NEOPLASM OF LOWER LOBE OF LEFT LUNG (HCC): Primary | ICD-10-CM

## 2025-03-26 PROCEDURE — 77386 HC NTSTY MODUL RAD TX DLVR CPLX: CPT | Performed by: RADIOLOGY

## 2025-03-26 RX ORDER — BENZONATATE 100 MG/1
200 CAPSULE ORAL 3 TIMES DAILY PRN
Qty: 60 CAPSULE | Refills: 0 | Status: SHIPPED | OUTPATIENT
Start: 2025-03-26 | End: 2025-04-02

## 2025-03-26 RX ORDER — AZITHROMYCIN 250 MG/1
TABLET, FILM COATED ORAL
Qty: 6 TABLET | Refills: 0 | Status: SHIPPED | OUTPATIENT
Start: 2025-03-26 | End: 2025-04-05

## 2025-03-26 ASSESSMENT — PAIN DESCRIPTION - LOCATION: LOCATION: GENERALIZED

## 2025-03-26 ASSESSMENT — PAIN SCALES - GENERAL: PAINLEVEL_OUTOF10: 8

## 2025-03-26 NOTE — PROGRESS NOTES
[facility]       Radiation Oncology          [address] [place] [address]        O: [phone]        F: [phone]       [facility]             RADIATION ONCOLOGY WEEKLY PROGRESS NOTE  Patient ID:   Yvette Andrews  : 1945   MRN: [id number]    Location:  [facility] Radiation Oncology,   [address] [place] [address], [place] [address]   [phone]    DIAGNOSIS:  Presumed diagnosis of left lung carcinoma T3 N0 M0      TREATMENT DETAILS:  Treatment Site: LLL lesions (2 lesions)  Actual Dose: 4800cGy  Total Planned Dose: 6000cGy  Treatment Technique: IMRT  Fraction Technique: Daily  Therapy imaging monitoring: CBCT daily  Concurrent Chemotherapy: None    SUBJECTIVE:   Patient seen for their weekly on treatment evaluation today.  Reports of productive of yellowish sputum, worsening shortness of breath, fatigue    OBJECTIVE:     ECO Symptomatic but completely ambulatory    VITAL SIGNS: Pulse 85   Temp 97.8 °F (36.6 °C) (Temporal)   Resp 18   Wt 77.3 kg (170 lb 6.4 oz)   SpO2 96%   BMI 34.42 kg/m²   Wt Readings from Last 5 Encounters:   25 77.3 kg (170 lb 6.4 oz)   25 78.8 kg (173 lb 12.8 oz)   25 80.6 kg (177 lb 9.6 oz)   25 76.3 kg (168 lb 3.2 oz)   21 90.4 kg (199 lb 4.7 oz)     GENERAL:  General appearance is that of a well-nourished, well-developed in no apparent distress.  HEART:  Normal rate and regular rhythm  LUNGS: Positive for cough productive of yellowish sputum nonlabored breathing  ABDOMEN:  Soft, nontender, non distended  EXTREMITIES:  No edema.  No calf tenderness.  MSK:  No spinal tenderness. Normal ROM.  NEUROLOGICAL: Alert and oriented. Strength and sensation intact bilaterally. No focal deficits.   PSYCH: Mood normal, behavior normal.      LABS:  WBC   Date Value Ref Range Status   2025 10.6 3.5 - 11.3 k/uL Final   2025 11.9 (H) 3.5 - 11.3 k/uL Final   01/10/2025 14.39 (H)

## 2025-03-26 NOTE — PROGRESS NOTES
Yvette Andrews  3/26/2025  Wt Readings from Last 3 Encounters:   03/26/25 77.3 kg (170 lb 6.4 oz)   03/19/25 78.8 kg (173 lb 12.8 oz)   03/12/25 80.6 kg (177 lb 9.6 oz)     Body mass index is 34.42 kg/m².        Treatment Area:Left Lung    Patient was seen today for weekly visit.      Comfort Alteration  Fatigue: Moderate    Ventilation Alteration-Wears Oxygen@2L/NC  Cough: Yes- occasional  Hemoptysis: No  Mucus Color: yes  Dyspnea: Yes- exertional and states overall this is worse since last week      Nutritional Alteration  Anorexia: No  Nausea: No   Vomiting: No     Mucous Membrane Alteration  Voice Changes/ Stridor/Larynx: no  Pharynx & Esophagus: WNL    Elimination Alterations  Constipation: no  Diarrhea:  no    Skin Alteration   Sensation:intact    Radiation Dermatitis:  Intact [x]     Erythema  []     Discoloration  []     Rash []     Dry desquamation  []     Moist desquamation []       Emotional  Coping: effective      Injury, potential bleeding or infection: none    Lab Results   Component Value Date    WBC 10.6 03/24/2025    HGB 11.1 (L) 03/24/2025    HCT 38.2 03/24/2025     03/24/2025         Pulse 85   Temp 97.8 °F (36.6 °C) (Temporal)   Resp 18   Wt 77.3 kg (170 lb 6.4 oz)   SpO2 96%   BMI 34.42 kg/m²      Pain Assessment: 0-10  Pain Level: 8         Assessment/Plan: Patient was seen today for weekly visit.  States new cough with yellow sputum.  Reports generalized pain that is chronic in nature.  Generalized weakness and fatigue are ongoing.  Plan of care ongoing.      Raya Inman RN

## 2025-03-27 ENCOUNTER — HOSPITAL ENCOUNTER (OUTPATIENT)
Dept: RADIATION ONCOLOGY | Age: 80
Discharge: HOME | End: 2025-03-27
Payer: COMMERCIAL

## 2025-03-27 PROCEDURE — 77386 HC NTSTY MODUL RAD TX DLVR CPLX: CPT | Performed by: RADIOLOGY

## 2025-03-28 ENCOUNTER — HOSPITAL ENCOUNTER (OUTPATIENT)
Dept: RADIATION ONCOLOGY | Age: 80
Discharge: HOME | End: 2025-03-28
Payer: COMMERCIAL

## 2025-03-28 PROCEDURE — 77386 HC NTSTY MODUL RAD TX DLVR CPLX: CPT | Performed by: RADIOLOGY

## 2025-03-31 ENCOUNTER — HOSPITAL ENCOUNTER (OUTPATIENT)
Dept: RADIATION ONCOLOGY | Age: 80
Discharge: HOME | End: 2025-03-31
Payer: COMMERCIAL

## 2025-03-31 PROCEDURE — 77386 HC NTSTY MODUL RAD TX DLVR CPLX: CPT | Performed by: RADIOLOGY

## 2025-04-02 ENCOUNTER — CLINICAL DOCUMENTATION (OUTPATIENT)
Dept: RADIATION ONCOLOGY | Age: 80
End: 2025-04-02

## 2025-04-02 NOTE — PROGRESS NOTES
[facility]            Radiation Oncology          [id number] [place] [address]        O: [phone]        F: [phone]       [facility]           Dear Dr Jaquez ref. provider found:    Thank you for referring Yvette Andrews to me for evaluation and treatment. Below is a summary of the patient's recently completed radiation course.  If you have questions, please do not hesitate to call me. I look forward to following this patient along with you.    Sincerely,  Electronically signed by Brady Boyce MD on 2025 at 8:52 AM EDT      CC: Patient Care Team:  Bimal Chen DO as PCP - General  Maddox, Mecca, RN as Nurse Navigator (Oncology)  ------------------------------------------------------------------------------------------------------------------------------------------------------------------------------------------        Date of Service: 2025     Location:  [facility] Radiation Oncology,   [address] [place], [place] [id number] [phone]        RADIATION ONCOLOGY END OF TREATMENT SUMMARY:    Patient ID:   Yvette Andrews  : 1945   MRN: [id number]    DIAGNOSIS:  Presumed diagnosis of left lung carcinoma T3 N0 M0      TREATMENT DETAILS:  Treatment Site: LLL lesions (2 lesions)  Actual Dose: 6000cGy  Total Planned Dose: 6000cGy  Treatment Technique: IMRT  Fraction Technique: Daily  Therapy imaging monitoring: CBCT daily  Concurrent Chemotherapy: None  Total number of fractions: 15  Treatment dates: 3/11/2025 to 3/31/2025    CLINICAL COURSE:    ECOG 1    Patient completed her course of radiation therapy as prescribed.  She experienced fatigue, developed COPD exacerbation and was given a course of steroid and antibiotics, otherwise no significant toxicities were reported during her course of therapy.  Patient will have her first posttreatment CT chest to be completed in 3 months.  She is to follow-up with

## 2025-04-21 ENCOUNTER — TELEPHONE (OUTPATIENT)
Dept: ONCOLOGY | Age: 80
End: 2025-04-21

## 2025-04-21 ENCOUNTER — OFFICE VISIT (OUTPATIENT)
Dept: ONCOLOGY | Age: 80
End: 2025-04-21
Payer: COMMERCIAL

## 2025-04-21 VITALS
SYSTOLIC BLOOD PRESSURE: 134 MMHG | DIASTOLIC BLOOD PRESSURE: 75 MMHG | RESPIRATION RATE: 16 BRPM | TEMPERATURE: 97.1 F | HEART RATE: 64 BPM | WEIGHT: 173.4 LBS | BODY MASS INDEX: 35.02 KG/M2

## 2025-04-21 DIAGNOSIS — F17.200 SMOKER: ICD-10-CM

## 2025-04-21 DIAGNOSIS — J44.1 CHRONIC OBSTRUCTIVE PULMONARY DISEASE WITH ACUTE EXACERBATION (HCC): Primary | ICD-10-CM

## 2025-04-21 DIAGNOSIS — M05.79 RHEUMATOID ARTHRITIS INVOLVING MULTIPLE SITES WITH POSITIVE RHEUMATOID FACTOR (HCC): ICD-10-CM

## 2025-04-21 DIAGNOSIS — R91.8 LUNG MASS: ICD-10-CM

## 2025-04-21 PROCEDURE — 3078F DIAST BP <80 MM HG: CPT | Performed by: INTERNAL MEDICINE

## 2025-04-21 PROCEDURE — 3075F SYST BP GE 130 - 139MM HG: CPT | Performed by: INTERNAL MEDICINE

## 2025-04-21 PROCEDURE — 99211 OFF/OP EST MAY X REQ PHY/QHP: CPT | Performed by: INTERNAL MEDICINE

## 2025-04-21 PROCEDURE — 99214 OFFICE O/P EST MOD 30 MIN: CPT | Performed by: INTERNAL MEDICINE

## 2025-04-21 PROCEDURE — 1123F ACP DISCUSS/DSCN MKR DOCD: CPT | Performed by: INTERNAL MEDICINE

## 2025-04-21 PROCEDURE — 1125F AMNT PAIN NOTED PAIN PRSNT: CPT | Performed by: INTERNAL MEDICINE

## 2025-04-21 RX ORDER — ISOSORBIDE MONONITRATE 120 MG/1
TABLET, EXTENDED RELEASE ORAL
COMMUNITY
Start: 2025-02-10

## 2025-04-21 RX ORDER — MONTELUKAST SODIUM 10 MG/1
10 TABLET ORAL NIGHTLY
COMMUNITY

## 2025-04-21 RX ORDER — OXYCODONE HYDROCHLORIDE 20 MG/1
20 TABLET ORAL EVERY 4 HOURS PRN
COMMUNITY
Start: 2024-06-04

## 2025-04-21 RX ORDER — ONDANSETRON 4 MG/1
TABLET, FILM COATED ORAL
COMMUNITY

## 2025-04-21 RX ORDER — ONDANSETRON 8 MG/1
TABLET, ORALLY DISINTEGRATING ORAL
COMMUNITY

## 2025-04-21 RX ORDER — MESALAMINE 1.2 G/1
TABLET, DELAYED RELEASE ORAL
COMMUNITY
Start: 2024-12-09

## 2025-04-21 NOTE — TELEPHONE ENCOUNTER
YVONNE HERE FOR FOLLOW UP   Refer to rheumatology   Rv in late July   RHEUMATOLOGY: REFERRAL FAXED [phone]  MD VISIT: 7/21/25 @ 11:15AM   AVS PRINTED AND GIVEN ON EXIT

## 2025-04-21 NOTE — PROGRESS NOTES
drink alcohol and does not use drugs.  The patient is a lifelong smoker she has extensive family history of malignancy but there are smokers  REVIEW OF SYSTEMS: Performance status ECOG 2  General: no fever or night sweats, Weight is stable.  ENT: No double or blurred vision, no tinnitus or hearing problem, no dysphagia or sore throat   Respiratory: Chronic cough and shortness of breath  Cardiovascular: Denies chest pain, PND or orthopnea. No L E swelling or palpitations.   Gastrointestinal:    No nausea or vomiting, abdominal pain, diarrhea or constipation.    Genitourinary: Denies dysuria, hematuria, frequency, urgency or incontinence.    Neurological: Denies headaches, decreased LOC, no sensory or motor focal deficits.  Chronic generalized weakness and she is using a wheelchair for mobility   Musculoskeletal: Chronic arthralgia.  Skin: There are no rashes or bleeding.  Psychiatric:  No anxiety, no depression.   Endocrine: no diabetes or thyroid disease.   Hematologic: no bleeding , no adenopathy.    PHYSICAL EXAM: Shows a chronically ill appearing 79 y.o.-year-old female who is not in pain or distress.  She is in a wheelchair and using oxygen vital Signs: Blood pressure 134/75, pulse 64, temperature 97.1 °F (36.2 °C), temperature source Temporal, resp. rate 16, weight 78.7 kg (173 lb 6.4 oz). HEENT: Normocephalic and atraumatic. Pupils are equal, round, reactive to light and accommodation. Extraocular muscles are intact. Neck: Showed no JVD, no carotid bruit . Lungs: Almost absent air entry, no wheezing appreciated. Heart: Regular without any murmur. Abdomen: Soft, nontender. No hepatosplenomegaly. Extremities: Lower extremities show no edema, clubbing, or cyanosis. Breasts: Examination not done today. Neuro exam: intact cranial nerves bilaterally no motor or sensory deficit, gait is normal. Lymphatic: no adenopathy appreciated in the supraclavicular, axillary, cervical or inguinal area    REVIEW OF LABORATORY

## 2025-04-30 ENCOUNTER — TELEPHONE (OUTPATIENT)
Age: 80
End: 2025-04-30

## 2025-04-30 NOTE — TELEPHONE ENCOUNTER
Name: Yvette Andrews  : 1945  MRN: [id number]    Oncology Navigation Follow-Up Note    Contact Type:  Telephone    Notes: Navigator spoke with daughter and confirmed pt. Does have rheumatology appt. Upcoming and hopefully they can help her pain.       Electronically signed by Mecca Maddox RN on 2025 at 10:02 AM

## 2025-06-26 ENCOUNTER — HOSPITAL ENCOUNTER (OUTPATIENT)
Dept: CT IMAGING | Age: 80
Discharge: HOME | End: 2025-06-28
Attending: RADIOLOGY
Payer: COMMERCIAL

## 2025-06-26 DIAGNOSIS — C34.32 MALIGNANT NEOPLASM OF LOWER LOBE OF LEFT LUNG (HCC): ICD-10-CM

## 2025-06-26 PROCEDURE — 71250 CT THORAX DX C-: CPT

## 2025-07-11 ENCOUNTER — HOSPITAL ENCOUNTER (OUTPATIENT)
Dept: RADIATION ONCOLOGY | Age: 80
Discharge: HOME | End: 2025-07-11
Payer: COMMERCIAL

## 2025-07-11 VITALS
HEART RATE: 55 BPM | BODY MASS INDEX: 35.35 KG/M2 | WEIGHT: 175 LBS | RESPIRATION RATE: 18 BRPM | DIASTOLIC BLOOD PRESSURE: 51 MMHG | OXYGEN SATURATION: 95 % | SYSTOLIC BLOOD PRESSURE: 118 MMHG | TEMPERATURE: 97 F

## 2025-07-11 DIAGNOSIS — C34.32 MALIGNANT NEOPLASM OF LOWER LOBE OF LEFT LUNG (HCC): Primary | ICD-10-CM

## 2025-07-11 PROCEDURE — 99213 OFFICE O/P EST LOW 20 MIN: CPT | Performed by: RADIOLOGY

## 2025-07-11 ASSESSMENT — PAIN SCALES - GENERAL: PAINLEVEL_OUTOF10: 8

## 2025-07-11 ASSESSMENT — PAIN DESCRIPTION - LOCATION: LOCATION: GENERALIZED

## 2025-07-11 NOTE — PROGRESS NOTES
Yvette Andrews  7/11/2025  2:44 PM      Vitals:    07/11/25 1436   BP: (!) 118/51   Pulse: 55   Resp: 18   Temp: 97 °F (36.1 °C)   SpO2: 95%    :     Pain Assessment: 0-10  Pain Level: 8       Wt Readings from Last 1 Encounters:   07/11/25 79.4 kg (175 lb)                Current Outpatient Medications:     isosorbide mononitrate (IMDUR) 120 MG extended release tablet, , Disp: , Rfl:     mesalamine (LIALDA) 1.2 g EC tablet, TAKE 4 TABLETS BY MOUTH EVERY MORNING WITH BREAKFAST, Disp: , Rfl:     montelukast (SINGULAIR) 10 MG tablet, Take 1 tablet by mouth nightly, Disp: , Rfl:     ondansetron (ZOFRAN-ODT) 8 MG TBDP disintegrating tablet, dissolve 1 tablet ON TONGUE three times a day if needed for nausea, Disp: , Rfl:     ondansetron (ZOFRAN) 4 MG tablet, , Disp: , Rfl:     oxyCODONE (ROXICODONE) 20 MG immediate release tablet, Take 1 tablet by mouth every 4 hours as needed., Disp: , Rfl:     bumetanide (BUMEX) 2 MG tablet, Take 1 tablet by mouth daily, Disp: , Rfl:     fentaNYL (DURAGESIC) 50 MCG/HR, 75 MCG, Disp: , Rfl:     FLUoxetine (PROZAC) 20 MG capsule, Take 1 capsule by mouth daily, Disp: , Rfl:     BREO ELLIPTA 200-25 MCG/ACT AEPB inhaler, , Disp: , Rfl:     LORazepam (ATIVAN) 0.5 MG tablet, take 1 tablet by mouth twice a day if needed for ACUTE PANIC ATTACK, Disp: , Rfl:     potassium chloride (KLOR-CON M) 20 MEQ extended release tablet, Take 1 tablet by mouth every other day, Disp: , Rfl:     predniSONE (DELTASONE) 10 MG tablet, Take 1 tablet by mouth daily, Disp: , Rfl:     rizatriptan (MAXALT) 5 MG tablet, take 1 tablet by mouth AT ONSET OF HEADACHE may repeat in 2 hours...  (REFER TO PRESCRIPTION NOTES)., Disp: , Rfl:     ranolazine (RANEXA) 500 MG extended release tablet, Take 1 tablet by mouth every 12 hours, Disp: , Rfl:     spironolactone (ALDACTONE) 25 MG tablet, Take 1 tablet by mouth daily, Disp: , Rfl:     vedolizumab (ENTYVIO) 300 MG injection, Infuse 300 mg intravenously once (Patient not

## 2025-07-11 NOTE — PROGRESS NOTES
[facility]            Radiation Oncology          [address] [place] [address]        O: [phone]        F: [phone]       [email or web address]           Date of Service: 2025     Location:  [facility] Radiation Oncology,   [address] [place] [address]   [phone]       RADIATION ONCOLOGY FOLLOW UP NOTE    Patient ID:   Yvette Andrews  : 1945   MRN: [id number]    DIAGNOSIS:  Cancer Staging   No matching staging information was found for the patient.        INTERVAL HISTORY:   Yvette Andrews is a 79 y.o.. female with ***      AUA Score: ***    MEDICATIONS:    Current Outpatient Medications:     isosorbide mononitrate (IMDUR) 120 MG extended release tablet, , Disp: , Rfl:     mesalamine (LIALDA) 1.2 g EC tablet, TAKE 4 TABLETS BY MOUTH EVERY MORNING WITH BREAKFAST, Disp: , Rfl:     montelukast (SINGULAIR) 10 MG tablet, Take 1 tablet by mouth nightly, Disp: , Rfl:     ondansetron (ZOFRAN-ODT) 8 MG TBDP disintegrating tablet, dissolve 1 tablet ON TONGUE three times a day if needed for nausea, Disp: , Rfl:     ondansetron (ZOFRAN) 4 MG tablet, , Disp: , Rfl:     oxyCODONE (ROXICODONE) 20 MG immediate release tablet, Take 1 tablet by mouth every 4 hours as needed., Disp: , Rfl:     bumetanide (BUMEX) 2 MG tablet, Take 1 tablet by mouth daily, Disp: , Rfl:     fentaNYL (DURAGESIC) 50 MCG/HR, 75 MCG, Disp: , Rfl:     FLUoxetine (PROZAC) 20 MG capsule, Take 1 capsule by mouth daily, Disp: , Rfl:     BREO ELLIPTA 200-25 MCG/ACT AEPB inhaler, , Disp: , Rfl:     LORazepam (ATIVAN) 0.5 MG tablet, take 1 tablet by mouth twice a day if needed for ACUTE PANIC ATTACK, Disp: , Rfl:     potassium chloride (KLOR-CON M) 20 MEQ extended release tablet, Take 1 tablet by mouth every other day, Disp: , Rfl:     predniSONE (DELTASONE) 10 MG tablet, Take 1 tablet by mouth daily, Disp: , Rfl:     rizatriptan (MAXALT) 5 MG tablet, take 1 tablet

## 2025-07-18 ENCOUNTER — HOSPITAL ENCOUNTER (OUTPATIENT)
Facility: MEDICAL CENTER | Age: 80
End: 2025-07-18

## 2025-07-21 ENCOUNTER — TELEPHONE (OUTPATIENT)
Age: 80
End: 2025-07-21

## 2025-07-21 ENCOUNTER — OFFICE VISIT (OUTPATIENT)
Age: 80
End: 2025-07-21
Payer: COMMERCIAL

## 2025-07-21 VITALS
SYSTOLIC BLOOD PRESSURE: 127 MMHG | TEMPERATURE: 97.7 F | WEIGHT: 174 LBS | RESPIRATION RATE: 20 BRPM | DIASTOLIC BLOOD PRESSURE: 57 MMHG | BODY MASS INDEX: 35.14 KG/M2 | HEART RATE: 72 BPM

## 2025-07-21 DIAGNOSIS — R91.8 LUNG MASS: ICD-10-CM

## 2025-07-21 DIAGNOSIS — F17.200 SMOKER: ICD-10-CM

## 2025-07-21 DIAGNOSIS — J44.1 CHRONIC OBSTRUCTIVE PULMONARY DISEASE WITH ACUTE EXACERBATION (HCC): Primary | ICD-10-CM

## 2025-07-21 PROCEDURE — 99214 OFFICE O/P EST MOD 30 MIN: CPT | Performed by: INTERNAL MEDICINE

## 2025-07-21 PROCEDURE — 3074F SYST BP LT 130 MM HG: CPT | Performed by: INTERNAL MEDICINE

## 2025-07-21 PROCEDURE — 1123F ACP DISCUSS/DSCN MKR DOCD: CPT | Performed by: INTERNAL MEDICINE

## 2025-07-21 PROCEDURE — 3078F DIAST BP <80 MM HG: CPT | Performed by: INTERNAL MEDICINE

## 2025-07-21 NOTE — PROGRESS NOTES
supraclavicular, axillary, cervical or inguinal area    REVIEW OF LABORATORY DATA:     REVIEW OF RADIOLOGICAL RESULTS:   Ct scan 7/25   IMPRESSION:  1. 1.6 cm cavitary nodule with some peripheral thickening within the left  lower lobe. This is in the same place of solid mass seen on CT chest  12/23/2024 and demonstrates a decrease in size consistent with positive  response to treatment.  2. Resolution of left lower lobe solid nodule seen on prior exam. This now  appears to be parenchymal scar.  3. 5 mm solid nodule within the left lower lobe. This appears slightly more  prominent compared to prior exam.  4. 1.2 cm ground-glass nodule within the right upper lobe. This appears  similar to prior exam.  5. 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  6. Main pulmonary trunk measures 4.3 cm. This can be seen in the setting of  pulmonary arterial hypertension.     IMPRESSION:   Enlarging lung mass, highly suspicious for malignancy  Severe COPD  Multiple comorbidities and poor performance status  Decision to proceed with SBRT, completed 3/2025  Diffuse aches and pains.  PLAN:   Patient is doing very well.  CT scan reviewed with the patient and her daughter.  Doing quite well and the mass is shrinking.  I think a lot of her symptoms could be related to radiation.  We will increase her steroids from 10 mg up to 20 mg for the next few days  She will need to follow-up with pulmonary service and will make an appointment as soon as possible  Patient was reassured that she is in remission and there is no evidence of cancer recurrence.    Follow-up CT scan will be done in 3 months  We will coordinate future follow-up with radiation oncology  Vipin Delgado MD  Hematologist/Medical Oncologist  [facility]

## 2025-07-21 NOTE — TELEPHONE ENCOUNTER
YVONNE HERE FOR FOLLOW UP   Rv on 10/23 in [facility] please coordinate with rad onc appt   WRITER TRIED CALLING [facility] & PHONES ARE DOWN   PLEASE REACH OUT TO PT TO SCHEDULE F/U ON 10/23/25.  AVS PRINTED AND GIVEN ON EXIT

## 2025-07-22 NOTE — TELEPHONE ENCOUNTER
Scheduled for 10/23/25 at 10 am with Dr. Hansen at [place] [place] with RO to follow. Tried to call patient, no answer and voice mail is full.

## (undated) DEVICE — CUP MED 1OZ CLR POLYPR FEED GRAD W/O LID

## (undated) DEVICE — FORCEPS BX L240CM JAW DIA2.2MM RAD JAW 4 HOT DISP

## (undated) DEVICE — TUBING, SUCTION, 1/4" X 12', STRAIGHT: Brand: MEDLINE

## (undated) DEVICE — CONMED DISPOSABLE GASTROINTESTINAL CYTOLOGY BRUSH, STRAIGHT HANDLE, 2.5 MM X 160 CM: Brand: CONMED

## (undated) DEVICE — GLOVE SURG 8 11.7IN BEAD CUF LIGHT BRN SENSICARE LTX FREE

## (undated) DEVICE — FORCEPS BX L240CM WRK CHN 2.8MM STD CAP W/ NDL MIC MESH

## (undated) DEVICE — MEDICINE CUP, GRADUATED, STER: Brand: MEDLINE

## (undated) DEVICE — YANKAUER,FLEXIBLE HANDLE,REGLR CAPACITY: Brand: MEDLINE INDUSTRIES, INC.

## (undated) DEVICE — Device: Brand: DEFENDO VALVE AND CONNECTOR KIT

## (undated) DEVICE — CO2 CANNULA,SUPERSOFT, ADLT,7'O2,7'CO2: Brand: MEDLINE

## (undated) DEVICE — SYRINGE MED 50ML LUERLOCK TIP

## (undated) DEVICE — JELLY,LUBE,STERILE,FLIP TOP,TUBE,2-OZ: Brand: MEDLINE

## (undated) DEVICE — GAUZE,SPONGE,4"X4",16PLY,STRL,LF,10/TRAY: Brand: MEDLINE

## (undated) DEVICE — BASIN EMSIS 700ML GRAPHITE PLAS KID SHP GRAD

## (undated) DEVICE — BLOCK BITE 60FR RUBBER ADLT DENTAL

## (undated) DEVICE — ADAPTER TBNG LUER STUB 15 GA INTMED

## (undated) DEVICE — GOWN,AURORA,NONREINFORCED,LARGE: Brand: MEDLINE

## (undated) DEVICE — FORCEPS BX L240CM JAW DIA22MM ORNG STD CAP W NDL RAD JAW 4

## (undated) DEVICE — SNARE ENDOSCP M L240CM LOOP W27MM SHTH DIA2.4MM OVL FLX

## (undated) DEVICE — TRAP SURG QUAD PARABOLA SLOT DSGN SFTY SCRN TRAPEASE

## (undated) DEVICE — TRAP SPEC RETRV CLR PLAS POLYP IN LN SUCT QUIK CTCH